# Patient Record
Sex: FEMALE | Race: WHITE | Employment: UNEMPLOYED | URBAN - METROPOLITAN AREA
[De-identification: names, ages, dates, MRNs, and addresses within clinical notes are randomized per-mention and may not be internally consistent; named-entity substitution may affect disease eponyms.]

---

## 2017-01-30 DIAGNOSIS — R26.9 GAIT ABNORMALITY: ICD-10-CM

## 2017-01-30 DIAGNOSIS — G62.9 NEUROPATHY: ICD-10-CM

## 2017-01-30 RX ORDER — DULOXETIN HYDROCHLORIDE 60 MG/1
CAPSULE, DELAYED RELEASE ORAL
Qty: 180 CAP | Refills: 2 | Status: SHIPPED | OUTPATIENT
Start: 2017-01-30

## 2017-05-25 ENCOUNTER — HOSPITAL ENCOUNTER (OUTPATIENT)
Dept: MRI IMAGING | Age: 62
Discharge: HOME | End: 2017-05-25
Attending: PHYSICAL MEDICINE & REHABILITATION
Payer: MEDICARE

## 2017-05-25 DIAGNOSIS — M54.50 LUMBAGO: ICD-10-CM

## 2017-05-25 DIAGNOSIS — M54.16 LUMBAR RADICULOPATHY: ICD-10-CM

## 2017-05-25 DIAGNOSIS — M47.816 LUMBAR SPONDYLOSIS: ICD-10-CM

## 2017-05-25 PROCEDURE — 72148 MRI LUMBAR SPINE W/O DYE: CPT

## 2017-12-28 ENCOUNTER — HOSPITAL ENCOUNTER (OUTPATIENT)
Dept: MRI IMAGING | Age: 62
Discharge: HOME | End: 2017-12-28
Attending: PHYSICAL MEDICINE & REHABILITATION
Payer: MEDICARE

## 2017-12-28 DIAGNOSIS — M50.30 DDD (DEGENERATIVE DISC DISEASE), CERVICAL: ICD-10-CM

## 2017-12-28 DIAGNOSIS — M54.2 CERVICALGIA: ICD-10-CM

## 2017-12-28 PROCEDURE — 72141 MRI NECK SPINE W/O DYE: CPT

## 2018-05-19 ENCOUNTER — HOSPITAL ENCOUNTER (OUTPATIENT)
Dept: MRI IMAGING | Age: 63
Discharge: HOME | End: 2018-05-19
Attending: PHYSICAL MEDICINE & REHABILITATION
Payer: MEDICARE

## 2018-05-19 DIAGNOSIS — M51.36 DDD (DEGENERATIVE DISC DISEASE), LUMBAR: ICD-10-CM

## 2018-05-19 DIAGNOSIS — M54.31 RIGHT SIDED SCIATICA: ICD-10-CM

## 2018-05-19 PROCEDURE — 72148 MRI LUMBAR SPINE W/O DYE: CPT

## 2018-05-25 ENCOUNTER — ANESTHESIA (OUTPATIENT)
Dept: ENDOSCOPY | Age: 63
End: 2018-05-25
Payer: MEDICARE

## 2018-05-25 ENCOUNTER — HOSPITAL ENCOUNTER (OUTPATIENT)
Age: 63
Setting detail: OUTPATIENT SURGERY
Discharge: HOME | End: 2018-05-25
Attending: SPECIALIST | Admitting: SPECIALIST
Payer: MEDICARE

## 2018-05-25 ENCOUNTER — ANESTHESIA EVENT (OUTPATIENT)
Dept: ENDOSCOPY | Age: 63
End: 2018-05-25
Payer: MEDICARE

## 2018-05-25 VITALS
HEART RATE: 85 BPM | BODY MASS INDEX: 27.49 KG/M2 | SYSTOLIC BLOOD PRESSURE: 135 MMHG | WEIGHT: 149.38 LBS | OXYGEN SATURATION: 100 % | HEIGHT: 62 IN | RESPIRATION RATE: 11 BRPM | DIASTOLIC BLOOD PRESSURE: 98 MMHG | TEMPERATURE: 97.5 F

## 2018-05-25 PROCEDURE — 77030009426 HC FCPS BIOP ENDOSC BSC -B: Performed by: SPECIALIST

## 2018-05-25 PROCEDURE — 88305 TISSUE EXAM BY PATHOLOGIST: CPT | Performed by: SPECIALIST

## 2018-05-25 PROCEDURE — 74011250636 HC RX REV CODE- 250/636

## 2018-05-25 PROCEDURE — 74011000250 HC RX REV CODE- 250

## 2018-05-25 PROCEDURE — 76060000031 HC ANESTHESIA FIRST 0.5 HR: Performed by: SPECIALIST

## 2018-05-25 PROCEDURE — 74011250636 HC RX REV CODE- 250/636: Performed by: SPECIALIST

## 2018-05-25 PROCEDURE — 76040000019: Performed by: SPECIALIST

## 2018-05-25 RX ORDER — EPINEPHRINE 0.1 MG/ML
1 INJECTION INTRACARDIAC; INTRAVENOUS
Status: CANCELLED | OUTPATIENT
Start: 2018-05-25 | End: 2018-05-25

## 2018-05-25 RX ORDER — SODIUM CHLORIDE 0.9 % (FLUSH) 0.9 %
5-10 SYRINGE (ML) INJECTION AS NEEDED
Status: DISCONTINUED | OUTPATIENT
Start: 2018-05-25 | End: 2018-05-25 | Stop reason: HOSPADM

## 2018-05-25 RX ORDER — SODIUM CHLORIDE 0.9 % (FLUSH) 0.9 %
5-10 SYRINGE (ML) INJECTION EVERY 8 HOURS
Status: DISCONTINUED | OUTPATIENT
Start: 2018-05-25 | End: 2018-05-25 | Stop reason: HOSPADM

## 2018-05-25 RX ORDER — FLUMAZENIL 0.1 MG/ML
0.2 INJECTION INTRAVENOUS
Status: CANCELLED | OUTPATIENT
Start: 2018-05-25 | End: 2018-05-25

## 2018-05-25 RX ORDER — ATROPINE SULFATE 0.1 MG/ML
0.5 INJECTION INTRAVENOUS
Status: CANCELLED | OUTPATIENT
Start: 2018-05-25 | End: 2018-05-25

## 2018-05-25 RX ORDER — LIDOCAINE HYDROCHLORIDE 20 MG/ML
INJECTION, SOLUTION EPIDURAL; INFILTRATION; INTRACAUDAL; PERINEURAL AS NEEDED
Status: DISCONTINUED | OUTPATIENT
Start: 2018-05-25 | End: 2018-05-25 | Stop reason: HOSPADM

## 2018-05-25 RX ORDER — PROPOFOL 10 MG/ML
INJECTION, EMULSION INTRAVENOUS AS NEEDED
Status: DISCONTINUED | OUTPATIENT
Start: 2018-05-25 | End: 2018-05-25 | Stop reason: HOSPADM

## 2018-05-25 RX ORDER — DEXTROMETHORPHAN/PSEUDOEPHED 2.5-7.5/.8
1.2 DROPS ORAL
Status: DISCONTINUED | OUTPATIENT
Start: 2018-05-25 | End: 2018-05-25 | Stop reason: HOSPADM

## 2018-05-25 RX ORDER — SODIUM CHLORIDE 9 MG/ML
50 INJECTION, SOLUTION INTRAVENOUS CONTINUOUS
Status: DISCONTINUED | OUTPATIENT
Start: 2018-05-25 | End: 2018-05-25 | Stop reason: HOSPADM

## 2018-05-25 RX ORDER — DEXTROMETHORPHAN/PSEUDOEPHED 2.5-7.5/.8
1.2 DROPS ORAL
Status: CANCELLED | OUTPATIENT
Start: 2018-05-25

## 2018-05-25 RX ORDER — NALOXONE HYDROCHLORIDE 0.4 MG/ML
0.4 INJECTION, SOLUTION INTRAMUSCULAR; INTRAVENOUS; SUBCUTANEOUS
Status: CANCELLED | OUTPATIENT
Start: 2018-05-25 | End: 2018-05-25

## 2018-05-25 RX ADMIN — PROPOFOL 120 MG: 10 INJECTION, EMULSION INTRAVENOUS at 08:26

## 2018-05-25 RX ADMIN — SODIUM CHLORIDE 50 ML/HR: 900 INJECTION, SOLUTION INTRAVENOUS at 08:05

## 2018-05-25 RX ADMIN — LIDOCAINE HYDROCHLORIDE 40 MG: 20 INJECTION, SOLUTION EPIDURAL; INFILTRATION; INTRACAUDAL; PERINEURAL at 08:10

## 2018-05-25 NOTE — IP AVS SNAPSHOT
[address] 
[phone] Patient: Telma Duffy MRN: [id number] About your hospitalization You were admitted on:  May 25, 2018 You last received care in the:  \[facility]\"" ENDOSCOPY You were discharged on:  May 25, 2018 Why you were hospitalized Your primary diagnosis was:  Not on File Follow-up Information None Discharge Orders None A check jamee indicates which time of day the medication should be taken. My Medications CONTINUE taking these medications Instructions Each Dose to Equal  
 Morning Noon Evening Bedtime  
 cyclobenzaprine 10 mg tablet Commonly known as:  FLEXERIL Your last dose was: Your next dose is: Take 1 Tab by mouth three (3) times daily as needed for Muscle Spasm(s). 10 mg  
    
   
   
   
  
 diclofenac EC 75 mg EC tablet Commonly known as:  VOLTAREN Your last dose was: Your next dose is: Take 75 mg by mouth three (3) times daily. 75 mg  
    
   
   
   
  
 diphenhydrAMINE 50 mg tablet Commonly known as:  BENADRYL Your last dose was: Your next dose is: Take 100 mg by mouth two (2) times a day. Indications: ALLERGIC REACTIONS  
 100 mg DULoxetine 60 mg capsule Commonly known as:  CYMBALTA Your last dose was: Your next dose is: TAKE 2 CAPSULES DAILY  
     
   
   
   
  
 gabapentin 300 mg capsule Commonly known as:  NEURONTIN Your last dose was: Your next dose is: TAKE 1 CAPSULE THREE TIMES A DAY  
     
   
   
   
  
 LOMOTIL 2.5-0.025 mg per tablet Generic drug:  diphenoxylate-atropine Your last dose was: Your next dose is: Take 1 Tab by mouth four (4) times daily as needed. 1 Tab  
    
   
   
   
  
 minocycline 100 mg capsule Commonly known as:  Severo Baar Your last dose was: Your next dose is: * PHENERGAN PO Your last dose was: Your next dose is: Take 25 mg by mouth as needed for Nausea. 25 mg  
    
   
   
   
  
 * promethazine 25 mg tablet Commonly known as:  PHENERGAN Your last dose was: Your next dose is: Take 1 tablet by mouth every six (6) hours as needed for Nausea. 25 mg  
    
   
   
   
  
 ZOCOR PO Your last dose was: Your next dose is: Take 20 mg by mouth nightly. 20 mg  
    
   
   
   
  
 * Notice: This list has 2 medication(s) that are the same as other medications prescribed for you. Read the directions carefully, and ask your doctor or other care provider to review them with you. Discharge Instructions Gurjitbaudilio Guerra [id number] 
1955 COLON DISCHARGE INSTRUCTIONS Discomfort: 
Redness at IV site- apply warm compress to area; if redness or soreness persist- contact your physician There may be a slight amount of blood passed from the rectum Gaseous discomfort- walking, belching will help relieve any discomfort You may not operate a vehicle for 12 hours You may not engage in an occupation involving machinery or appliances for rest of today You may not drink alcoholic beverages for at least 12 hours Avoid making any critical decisions for at least 24 hour DIET: 
 Regular diet.  however -  remember your colon is empty and a heavy meal will produce gas. Avoid these foods:  vegetables, fried / greasy foods, carbonated drinks for today. MEDICATIONS: 
  
 
 Regarding Aspirin or Nonsteroidal medications, please see below. ACTIVITY: 
You may resume your normal daily activities it is recommended that you spend the remainder of the day resting -  avoid any strenuous activity. CALL M.D. ANY SIGN OF: Increasing pain, nausea, vomiting Abdominal distension (swelling) New increased bleeding (oral or rectal) Fever (chills) ONLY  Tylenol as needed for pain. Follow-up Instructions: 
 Call Dr. Aaliyah Cervantes for results of procedure / biopsy in 4-5 days at telephone #  [phone] Repeat Colonoscopy in 5 years. . 
 
  
  
  
Introducing [facility]! Dear Jose Mcconnell: 
Thank you for requesting a [facility]ado account. Our records indicate that you already have an active Brevado account. You can access your account anytime at [email or web address] Did you know that you can access your hospital and ER discharge instructions at any time in Brevado? You can also review all of your test results from your hospital stay or ER visit. Additional Information If you have questions, please visit the Frequently Asked Questions section of the Brevado website at [email or web address] Remember, Brevado is NOT to be used for urgent needs. For medical emergencies, dial 911. Now available from your iPhone and Android! Introducing Gurjit Gonzalez As a [facility] patient, I wanted to make you aware of our electronic visit tool called Gurjit PurvisVoice Of TV. Zofia Roblesek 24/7 allows you to connect within minutes with a medical provider 24 hours a day, seven days a week via a mobile device or tablet or logging into a secure website from your computer. You can access Gurjit Teonievesfin from anywhere in the [place]. A virtual visit might be right for you when you have a simple condition and feel like you just dont want to get out of bed, or cant get away from work for an appointment, when your regular [facility] provider is not available (evenings, weekends or holidays), or when youre out of town and need minor care.   Electronic visits cost only $49 and if the Gurjit Gonzalez provider determines a prescription is needed to treat your condition, one can be electronically transmitted to a nearby pharmacy*. Please take a moment to enroll today if you have not already done so. The enrollment process is free and takes just a few minutes. To enroll, please download the Robert Cho 24/7 mary anne to your tablet or phone, or visit [email or web address] to enroll on your computer. And, as an [address] [place] [address] patient with a [facility] account, the results of your visits will be scanned into your electronic medical record and your primary care provider will be able to view the scanned results. We urge you to continue to see your regular [facility] provider for your ongoing medical care. And while your primary care provider may not be the one available when you seek a [facility] virtual visit, the peace of mind you get from getting a real diagnosis real time can be priceless. For more information on [facility], view our Frequently Asked Questions (FAQs) at [email or web address]. Sincerely, 
 
Matt Quiroz MD 
Chief Medical Officer [facility] Dedra Rucker *:  certain medications cannot be prescribed via [facility] Providers Seen During Your Hospitalization Provider Specialty Primary office phone Elena Gay MD Gastroenterology [phone] Your Primary Care Physician (PCP) Primary Care Physician Office Phone Office Fax Sharri Quan [phone] You are allergic to the following Allergen Reactions Aspirin Other (comments) Blacked out. Recent Documentation Height Weight BMI OB Status Smoking Status 1.575 m 67.8 kg 27.32 kg/m2 Hysterectomy Current Every Day Smoker Emergency Contacts Name Discharge Info Relation Home Work Mobile David Jovel DISCHARGE CAREGIVER [3] Spouse [3] [phone] Patient Belongings The following personal items are in your possession at time of discharge: Dental Appliances: Lowers, Uppers  Visual Aid: None Please provide this summary of care documentation to your next provider. Signatures-by signing, you are acknowledging that this After Visit Summary has been reviewed with you and you have received a copy. Patient Signature:  ____________________________________________________________ Date:  ____________________________________________________________  
  
Nadya [place] Provider Signature:  ____________________________________________________________ Date:  ____________________________________________________________

## 2018-05-25 NOTE — PERIOP NOTES
Anesthesia reports 120mg Propofol, 40mg Lidocaine and 200mL NS given during procedure. Received report from anesthesia staff on vital signs and status of patient.

## 2018-05-25 NOTE — ADDENDUM NOTE
Addendum  created 05/25/18 1213 by Kanika Marie DO    Anesthesia Event edited, Procedure Event Log accessed

## 2018-05-25 NOTE — H&P
Gastroenterology Outpatient History and Physical    Patient: Lonny Gonzalez    Physician: Saranya Serna MD    Vital Signs: Blood pressure (!) 141/91, pulse 81, temperature 97.9 °F (36.6 °C), resp. rate 14, height 5' 2\" (1.575 m), weight 67.8 kg (149 lb 6 oz), SpO2 97 %. Allergies: Allergies   Allergen Reactions    Aspirin Other (comments)     Blacked out. Chief Complaint: h/o polyps    History of Present Illness: 64 yo WF for colonoscopy for h/o polyps and diarrhea.     Justification for Procedure: above    History:  Past Medical History:   Diagnosis Date    Arthritis     RHEUMATOID AND OSTEOARTHRITIS    C. difficile diarrhea 2004    as of 10/23/14 pt denies any diarrhea or sx    Cancer ([facility] [address] 1982    uterine CA, hysterectomy, no chemo / radiation    Cancer ([facility] [address] 2008    Rt BR; lumpectomy, radiation with chemo pill x6 yrs    Fibromyalgia     GERD (gastroesophageal reflux disease)     Hypercholesteremia     Other ill-defined conditions(799.89)     GRANULOMA ANEULARE, SKIN DISORDER    Psychiatric disorder     ANXIETY    Seasonal allergies       Past Surgical History:   Procedure Laterality Date    ABDOMEN SURGERY [address] (1/2 REMOVED; \"WAS IN KNOTS\")    BREAST SURGERY PROCEDURE UNLISTED Right 2/2008    RIGHT BREAST LUMPECTOMY    HX CARPAL TUNNEL RELEASE Bilateral     HX CERVICAL DISKECTOMY  2/3/14    C4-5     HX COLONOSCOPY      HX GI      ENDOSCOPY (DILATATION/ESOPH)    HX HYSTERECTOMY  1982    HX KNEE ARTHROSCOPY Left     x2    HX MOHS PROCEDURES Right     HX ORTHOPAEDIC  2005, 2011    CERVICAL NECK    HX OTHER SURGICAL  3/2008    hematoma s/p Rt BR lumpectomy    HX TONSILLECTOMY  1963    T&A      Social History     Social History    Marital status:      Spouse name: N/A    Number of children: N/A    Years of education: N/A     Social History Main Topics    Smoking status: Current Every Day Smoker     Packs/day: 0.50     Years: 45.00    Smokeless tobacco: Never Used    Alcohol use Yes      Comment: once a month    Drug use: No    Sexual activity: Not Asked     Other Topics Concern    None     Social History Narrative      Family History   Problem Relation Age of Onset    Heart Disease Mother     High Cholesterol Mother     Hypertension Mother     High Cholesterol Father     Heart Disease Father     Obesity Father     Diabetes Father     Cancer Maternal Grandmother      colon    Anesth Problems Neg Hx        Medications:   Prior to Admission medications    Medication Sig Start Date End Date Taking? Authorizing Provider   DULoxetine (CYMBALTA) 60 mg capsule TAKE 2 CAPSULES DAILY 1/30/17  Yes Nicholas Campo MD   gabapentin (NEURONTIN) 300 mg capsule TAKE 1 CAPSULE THREE TIMES A DAY 5/3/16  Yes Avinash Briggs MD   cyclobenzaprine (FLEXERIL) 10 mg tablet Take 1 Tab by mouth three (3) times daily as needed for Muscle Spasm(s). 1/20/16  Yes Nicholas Campo MD   diphenhydrAMINE (BENADRYL) 50 mg tablet Take 100 mg by mouth two (2) times a day. Indications: ALLERGIC REACTIONS   Yes Historical Provider   SIMVASTATIN (ZOCOR PO) Take 20 mg by mouth nightly. Yes Bertha Miller MD   diphenoxylate-atropine (LOMOTIL) 2.5-0.025 mg per tablet Take 1 Tab by mouth four (4) times daily as needed. Yes Bertha Miller MD   minocycline (MINOCIN, DYNACIN) 100 mg capsule  4/9/15   Historical Provider   diclofenac EC (VOLTAREN) 75 mg EC tablet Take 75 mg by mouth three (3) times daily. Historical Provider   promethazine (PHENERGAN) 25 mg tablet Take 1 tablet by mouth every six (6) hours as needed for Nausea. 10/28/14   Cinthya Lawson DPM   PROMETHAZINE HCL (PHENERGAN PO) Take 25 mg by mouth as needed for Nausea. Bertha Miller MD       Physical Exam:   General: alert, no distress   HEENT: Head: Normocephalic, no lesions, without obvious abnormality.    Heart: regular rate and rhythm, S1, S2 normal, no murmur, click, rub or gallop   Lungs: chest clear, no wheezing, rales, normal symmetric air entry   Abdominal: soft, NT/ND + BS   Neurological: Grossly normal   Extremities: extremities normal, atraumatic, no cyanosis or edema     Findings/Diagnosis: h/o polyps    Plan of Care/Planned Procedure: Colonoscopy    Signed By: Donald Kinsey MD     May 25, 2018

## 2018-05-25 NOTE — PROCEDURES
Colonoscopy Procedure Note    Indications:   Personal history of colon polyps (screening only)    Referring Physician: Fabricio Castaneda NP  Anesthesia/Sedation: MAC anesthesia Propofol  Endoscopist:  Dr. Gopi Mina    Procedure in Detail:  Informed consent was obtained for the procedure, including sedation. Risks of perforation, hemorrhage, adverse drug reaction, and aspiration were discussed. The patient was placed in the left lateral decubitus position. Based on the pre-procedure assessment, including review of the patient's medical history, medications, allergies, and review of systems, she had been deemed to be an appropriate candidate for moderate sedation; she was therefore sedated with the medications listed above. The patient was monitored continuously with ECG tracing, pulse oximetry, blood pressure monitoring, and direct observations. A rectal examination was performed. The OSI110EN was inserted into the rectum and advanced under direct vision to the cecum, which was identified by the ileocecal valve and appendiceal orifice. The quality of the colonic preparation was fair. A careful inspection was made as the colonoscope was withdrawn, including a retroflexed view of the rectum; findings and interventions are described below. Appropriate photodocumentation was obtained. Findings:     1. Scope advanced to the cecum and terminal ileum. 2.  There is scattered stool present but overall adequate prep overall. 3.  There was a 4 mm polyp in the cecum s/p cold forcep removal.  4.  S/P Random bx of R and L side of colon to r/o colitis. Therapies:  See above    Specimen: Specimens were collected as described above and sent to pathology. Complications: None were encountered during the procedure. EBL: < 10 ml.     Recommendations:   -f/u path to determine next appropriate colonoscopy interval    Signed By: Elena Gay MD                        May 25, 2018

## 2018-05-25 NOTE — ROUTINE PROCESS
Tosin Martinez  1955  [id number]    Situation:  Verbal report received from: [address] RN  Procedure: Procedure(s):  COLONOSCOPY  COLON BIOPSY    Background:    Preoperative diagnosis: personal hystory of polyps  Postoperative diagnosis: Colon polyp    :  Dr. Arianne Serrano  Assistant(s): Endoscopy Technician-1: Cee Ricketts  Endoscopy RN-1: Gerardo England RN    Specimens:   ID Type Source Tests Collected by Time Destination   1 : Cecum polyp biopsy Preservative   Carolina Rascon MD 5/25/2018 0820 Pathology   2 : Random colon biopsy Preservative   Carolina Rascon MD 5/25/2018 5650 Pathology     H. Pylori  no    Assessment:  Intra-procedure medication    Anesthesia gave intra-procedure sedation and medications, see anesthesia flow sheet yes    Intravenous fluids: NS@ KVO     Vital signs stable       Abdominal assessment: round and soft       Recommendation:  Discharge patient per MD order  .     Family or Friend Cinda Silva   Permission to share finding with family or friend yes

## 2018-05-25 NOTE — ANESTHESIA POSTPROCEDURE EVALUATION
Post-Anesthesia Evaluation and Assessment    Patient: Telma Duffy MRN: [id number]  SSN: [id number]    YOB: 1955  Age: 63 y.o. Sex: female       Cardiovascular Function/Vital Signs  Visit Vitals    BP (!) 135/98    Pulse 85    Temp 36.4 °C (97.5 °F)    Resp 11    Ht 5' 2\" (1.575 m)    Wt 67.8 kg (149 lb 6 oz)    SpO2 100%    BMI 27.32 kg/m2       Patient is status post general, total IV anesthesia anesthesia for Procedure(s):  COLONOSCOPY  COLON BIOPSY. Nausea/Vomiting: None    Postoperative hydration reviewed and adequate. Pain:  Pain Scale 1: Numeric (0 - 10) (05/25/18 0840)  Pain Intensity 1: 0 (05/25/18 0840)   Managed    Neurological Status: At baseline    Mental Status and Level of Consciousness: Arousable    Pulmonary Status:   O2 Device: Room air (05/25/18 0900)   Adequate oxygenation and airway patent    Complications related to anesthesia: None    Post-anesthesia assessment completed.  No concerns    Signed By: Kanika Marie DO     May 25, 2018

## 2018-05-25 NOTE — ANESTHESIA PREPROCEDURE EVALUATION
Anesthetic History   No history of anesthetic complications            Review of Systems / Medical History  Patient summary reviewed, nursing notes reviewed and pertinent labs reviewed    Pulmonary          Smoker (1/4 -1/2 ppd)         Neuro/Psych         Psychiatric history (anxiety)     Cardiovascular                Pertinent negatives: Past MI: limited by feet.   Exercise tolerance: <4 METS: Limited by pain     GI/Hepatic/Renal     GERD (occasional): well controlled           Endo/Other        Arthritis (Rheumatoid & osteo) and cancer (uterin, 1982 & right breast, 2008--no lymph node work, s/p chemo, XRT)     Other Findings   Comments: Fibromyalgia  3 cervical fusions           Physical Exam    Airway  Mallampati: II  TM Distance: 4 - 6 cm  Neck ROM: decreased range of motion   Mouth opening: Normal     Cardiovascular    Rhythm: regular  Rate: normal      Pertinent negatives: No murmur   Dental    Dentition: Full upper dentures and Full lower dentures     Pulmonary  Breath sounds clear to auscultation               Abdominal  GI exam deferred       Other Findings            Anesthetic Plan    ASA: 3  Anesthesia type: general and total IV anesthesia          Induction: Intravenous  Anesthetic plan and risks discussed with: Patient

## 2018-05-25 NOTE — DISCHARGE INSTRUCTIONS
Aaron Ramírez  [id number]  1955    COLON DISCHARGE INSTRUCTIONS  Discomfort:  Redness at IV site- apply warm compress to area; if redness or soreness persist- contact your physician  There may be a slight amount of blood passed from the rectum  Gaseous discomfort- walking, belching will help relieve any discomfort  You may not operate a vehicle for 12 hours  You may not engage in an occupation involving machinery or appliances for rest of today  You may not drink alcoholic beverages for at least 12 hours  Avoid making any critical decisions for at least 24 hour  DIET:   Regular diet. - however -  remember your colon is empty and a heavy meal will produce gas. Avoid these foods:  vegetables, fried / greasy foods, carbonated drinks for today. MEDICATIONS:        Regarding Aspirin or Nonsteroidal medications, please see below. ACTIVITY:  You may resume your normal daily activities it is recommended that you spend the remainder of the day resting -  avoid any strenuous activity. CALL M.D. ANY SIGN OF:  Increasing pain, nausea, vomiting  Abdominal distension (swelling)  New increased bleeding (oral or rectal)  Fever (chills)    ONLY  Tylenol as needed for pain. Follow-up Instructions:   Call Dr. Marylou Pineda for results of procedure / biopsy in 4-5 days at telephone #  [phone]              Repeat Colonoscopy in 5 years. Maximo Joiner

## 2018-05-25 NOTE — PERIOP NOTES
Endoscope was pre-clean[facility] at the bedside [facility]

## 2018-06-14 ENCOUNTER — HOSPITAL ENCOUNTER (OUTPATIENT)
Dept: PREADMISSION TESTING | Age: 63
Discharge: HOME | End: 2018-06-14
Attending: ORTHOPAEDIC SURGERY
Payer: MEDICARE

## 2018-06-14 ENCOUNTER — HOSPITAL ENCOUNTER (OUTPATIENT)
Dept: GENERAL RADIOLOGY | Age: 63
Discharge: HOME | End: 2018-06-14
Attending: ORTHOPAEDIC SURGERY
Payer: MEDICARE

## 2018-06-14 VITALS
RESPIRATION RATE: 16 BRPM | HEART RATE: 96 BPM | WEIGHT: 150.57 LBS | OXYGEN SATURATION: 100 % | TEMPERATURE: 98.3 F | BODY MASS INDEX: 27.71 KG/M2 | DIASTOLIC BLOOD PRESSURE: 84 MMHG | HEIGHT: 62 IN | SYSTOLIC BLOOD PRESSURE: 137 MMHG

## 2018-06-14 LAB
25(OH)D3 SERPL-MCNC: 25 NG/ML (ref 30–100)
ABO + RH BLD: NORMAL
ALBUMIN SERPL-MCNC: 3.6 G/DL (ref 3.5–5)
ALBUMIN/GLOB SERPL: 1.4 {RATIO} (ref 1.1–2.2)
ALP SERPL-CCNC: 64 U/L (ref 45–117)
ALT SERPL-CCNC: 25 U/L (ref 12–78)
ANION GAP SERPL CALC-SCNC: 7 MMOL/L (ref 5–15)
APPEARANCE UR: CLEAR
AST SERPL-CCNC: 14 U/L (ref 15–37)
BACTERIA URNS QL MICRO: NEGATIVE /HPF
BILIRUB SERPL-MCNC: 0.4 MG/DL (ref 0.2–1)
BILIRUB UR QL: NEGATIVE
BLOOD GROUP ANTIBODIES SERPL: NORMAL
BUN SERPL-MCNC: 19 MG/DL (ref 6–20)
BUN/CREAT SERPL: 23 (ref 12–20)
CALCIUM SERPL-MCNC: 8.8 MG/DL (ref 8.5–10.1)
CHLORIDE SERPL-SCNC: 108 MMOL/L (ref 97–108)
CO2 SERPL-SCNC: 25 MMOL/L (ref 21–32)
COLOR UR: NORMAL
CREAT SERPL-MCNC: 0.81 MG/DL (ref 0.55–1.02)
EPITH CASTS URNS QL MICRO: NORMAL /LPF
ERYTHROCYTE [DISTWIDTH] IN BLOOD BY AUTOMATED COUNT: 14.5 % (ref 11.5–14.5)
EST. AVERAGE GLUCOSE BLD GHB EST-MCNC: 126 MG/DL
GLOBULIN SER CALC-MCNC: 2.5 G/DL (ref 2–4)
GLUCOSE SERPL-MCNC: 96 MG/DL (ref 65–100)
GLUCOSE UR STRIP.AUTO-MCNC: NEGATIVE MG/DL
HBA1C MFR BLD: 6 % (ref 4.2–6.3)
HCT VFR BLD AUTO: 38.9 % (ref 35–47)
HGB BLD-MCNC: 12.9 G/DL (ref 11.5–16)
HGB UR QL STRIP: NEGATIVE
HYALINE CASTS URNS QL MICRO: NORMAL /LPF (ref 0–5)
INR PPP: 1 (ref 0.9–1.1)
KETONES UR QL STRIP.AUTO: NEGATIVE MG/DL
LEUKOCYTE ESTERASE UR QL STRIP.AUTO: NEGATIVE
MCH RBC QN AUTO: 31 PG (ref 26–34)
MCHC RBC AUTO-ENTMCNC: 33.2 G/DL (ref 30–36.5)
MCV RBC AUTO: 93.5 FL (ref 80–99)
NITRITE UR QL STRIP.AUTO: NEGATIVE
NRBC # BLD: 0 K/UL (ref 0–0.01)
NRBC BLD-RTO: 0 PER 100 WBC
PH UR STRIP: 6.5 [PH] (ref 5–8)
PLATELET # BLD AUTO: 202 K/UL (ref 150–400)
PMV BLD AUTO: 10.4 FL (ref 8.9–12.9)
POTASSIUM SERPL-SCNC: 4.2 MMOL/L (ref 3.5–5.1)
PREALB SERPL-MCNC: 25.2 MG/DL (ref 20–40)
PROT SERPL-MCNC: 6.1 G/DL (ref 6.4–8.2)
PROT UR STRIP-MCNC: NEGATIVE MG/DL
PROTHROMBIN TIME: 9.6 SEC (ref 9–11.1)
RBC # BLD AUTO: 4.16 M/UL (ref 3.8–5.2)
RBC #/AREA URNS HPF: NORMAL /HPF (ref 0–5)
SODIUM SERPL-SCNC: 140 MMOL/L (ref 136–145)
SP GR UR REFRACTOMETRY: 1.02 (ref 1–1.03)
SPECIMEN EXP DATE BLD: NORMAL
UA: UC IF INDICATED,UAUC: NORMAL
UROBILINOGEN UR QL STRIP.AUTO: 0.2 EU/DL (ref 0.2–1)
WBC # BLD AUTO: 8.7 K/UL (ref 3.6–11)
WBC URNS QL MICRO: NORMAL /HPF (ref 0–4)

## 2018-06-14 PROCEDURE — 93005 ELECTROCARDIOGRAM TRACING: CPT

## 2018-06-14 PROCEDURE — 36415 COLL VENOUS BLD VENIPUNCTURE: CPT | Performed by: ORTHOPAEDIC SURGERY

## 2018-06-14 PROCEDURE — 82306 VITAMIN D 25 HYDROXY: CPT | Performed by: ORTHOPAEDIC SURGERY

## 2018-06-14 PROCEDURE — 86900 BLOOD TYPING SEROLOGIC ABO: CPT | Performed by: ORTHOPAEDIC SURGERY

## 2018-06-14 PROCEDURE — 83036 HEMOGLOBIN GLYCOSYLATED A1C: CPT | Performed by: ORTHOPAEDIC SURGERY

## 2018-06-14 PROCEDURE — 84134 ASSAY OF PREALBUMIN: CPT | Performed by: ORTHOPAEDIC SURGERY

## 2018-06-14 PROCEDURE — 80053 COMPREHEN METABOLIC PANEL: CPT | Performed by: ORTHOPAEDIC SURGERY

## 2018-06-14 PROCEDURE — 71046 X-RAY EXAM CHEST 2 VIEWS: CPT

## 2018-06-14 PROCEDURE — 81001 URINALYSIS AUTO W/SCOPE: CPT | Performed by: ORTHOPAEDIC SURGERY

## 2018-06-14 PROCEDURE — 85610 PROTHROMBIN TIME: CPT | Performed by: ORTHOPAEDIC SURGERY

## 2018-06-14 PROCEDURE — 85027 COMPLETE CBC AUTOMATED: CPT | Performed by: ORTHOPAEDIC SURGERY

## 2018-06-14 RX ORDER — DICLOFENAC SODIUM 10 MG/G
GEL TOPICAL AS NEEDED
COMMUNITY

## 2018-06-14 RX ORDER — SODIUM CHLORIDE, SODIUM LACTATE, POTASSIUM CHLORIDE, CALCIUM CHLORIDE 600; 310; 30; 20 MG/100ML; MG/100ML; MG/100ML; MG/100ML
25 INJECTION, SOLUTION INTRAVENOUS CONTINUOUS
Status: CANCELLED | OUTPATIENT
Start: 2018-06-19

## 2018-06-14 RX ORDER — ACETAMINOPHEN 10 MG/ML
1000 INJECTION, SOLUTION INTRAVENOUS ONCE
Status: CANCELLED | OUTPATIENT
Start: 2018-06-19 | End: 2018-06-19

## 2018-06-14 RX ORDER — CEFAZOLIN SODIUM/WATER 2 G/20 ML
2 SYRINGE (ML) INTRAVENOUS ONCE
Status: CANCELLED | OUTPATIENT
Start: 2018-06-19 | End: 2018-06-19

## 2018-06-14 RX ORDER — PREGABALIN 150 MG/1
150 CAPSULE ORAL ONCE
Status: CANCELLED | OUTPATIENT
Start: 2018-06-19 | End: 2018-06-19

## 2018-06-14 NOTE — PERIOP NOTES
[facility]  Preoperative Instructions        Surgery Date 06/19/18          Time of Arrival--  To be called with time Contact # [phone]    1. On the day of your surgery, please report to the Surgical Services Registration Desk and sign in at your designated time. The Surgery Center is located to the right of the Emergency Room. 2. You must have someone with you to drive you home. You should not drive a car for 24 hours following surgery. Please make arrangements for a friend or family member to stay with you for the first 24 hours after your surgery. 3. Do not have anything to eat or drink (including water, gum, mints, coffee, juice) after midnight 06/18/18     . ? This may not apply to medications prescribed by your physician. ?(Please note below the special instructions with medications to take the morning of your procedure.)    4. We recommend you do not drink any alcoholic beverages for 24 hours before and after your surgery. 5. Contact your surgeons office for instructions on the following medications: non-steroidal anti-inflammatory drugs (i.e. Advil, Aleve), vitamins, and supplements. (Some surgeons will want you to stop these medications prior to surgery and others may allow you to take them)  **If you are currently taking Plavix, Coumadin, Aspirin and/or other blood-thinning agents, contact your surgeon for instructions. ** Your surgeon will partner with the physician prescribing these medications to determine if it is safe to stop or if you need to continue taking. Please do not stop taking these medications without instructions from your surgeon    6. Wear comfortable clothes. Wear glasses instead of contacts. Do not bring any money or jewelry. Please bring picture ID, insurance card, and any prearranged co-payment or hospital payment. Do not wear make-up, particularly mascara the morning of your surgery.   Do not wear nail polish, particularly if you are having foot /hand surgery. Wear your hair loose or down, no ponytails, buns, christi pins or clips. All body piercings must be removed. Please shower with antibacterial soap for three consecutive days before and on the morning of surgery, but do not apply any lotions, powders or deodorants after the shower on the day of surgery. Please use a fresh towels after each shower. Please sleep in clean clothes and change bed linens the night before surgery. Please do not shave for 48 hours prior to surgery. Shaving of the face is acceptable. 7. You should understand that if you do not follow these instructions your surgery may be cancelled. If your physical condition changes (I.e. fever, cold or flu) please contact your surgeon as soon as possible. 8. It is important that you be on time. If a situation occurs where you may be late, please call [phone] (OR Holding Area). 9. If you have any questions and or problems, please call [phone] (Pre-admission Testing). 10. Your surgery time may be subject to change. You will receive a phone call the evening prior if your time changes. 11.  If having outpatient surgery, you must have someone to drive you here, stay with you during the duration of your stay, and to drive you home at time of discharge. 12.   In an effort to improve the efficiency, privacy, and safety for all of our Pre-op patients visitors are not allowed in the Holding area. Once you arrive and are registered your family/visitors will be asked to remain in the waiting room. The Pre-op staff will get you from the Surgical Waiting Area and will explain to you and your family/visitors that the Pre-op phase is beginning. The staff will answer any questions and provide instructions for tracking of the patient, by use of the existing tracking number and color-coded status board in the waiting room.   At this time the staff will also ask for your designated spokesperson information in the event that the physician or staff need to provide an update or obtain any pertinent information. The designated spokesperson will be notified if the physician needs to speak to family during the pre-operative phase. If at any time your family/visitors has questions or concerns they may approach the volunteer desk in the waiting area for assistance. Special Instructions:Practice incentive spirometry/please bring incentive spirometry to the hospital for use    MEDICATIONS TO TAKE THE MORNING OF SURGERY WITH A SIP OF WATER:flexeril as needed,cymbalta,gabapentin      I understand a pre-operative phone call will be made to verify my surgery time. In the event that I am not available, I give permission for a message to be left on my answering service and/or with another person?   yes          ___________________      __________   _________    (Signature of Patient)             (Witness)                (Date and Time)

## 2018-06-14 NOTE — PERIOP NOTES
Incentive Spirometer        Using the incentive spirometer helps expand the small air sacs of your lungs, helps you breathe deeply, and helps improve your lung function. Use your incentive spirometer twice a day (10 breaths each time) prior to surgery. How to Use Your Incentive Spirometer:  1. Hold the incentive spirometer in an upright position. 2. Breathe out as usual.   3. Place the mouthpiece in your mouth and seal your lips tightly around it. 4. Take a deep breath. Breathe in slowly and as deeply as possible. Keep the blue flow rate guide between the arrows. 5. Hold your breath as long as possible. Then exhale slowly and allow the piston to fall to the bottom of the column. 6. Rest for a few seconds and repeat steps one through five at least 10 times. PAT Tidal Volume______2500____________  x____2____________  Date____06/14/18___________________    Braulio Hamper THE INCENTIVE SPIROMETER WITH YOU TO THE HOSPITAL ON THE DAY OF YOUR SURGERY. Opportunity given to ask and answer questions as well as to observe return demonstration.     Patient signature_____________________________    Witness____________________________
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
## 2018-06-15 LAB
ATRIAL RATE: 89 BPM
BACTERIA SPEC CULT: NORMAL
BACTERIA SPEC CULT: NORMAL
CALCULATED P AXIS, ECG09: 52 DEGREES
CALCULATED R AXIS, ECG10: 29 DEGREES
CALCULATED T AXIS, ECG11: 47 DEGREES
DIAGNOSIS, 93000: NORMAL
P-R INTERVAL, ECG05: 138 MS
Q-T INTERVAL, ECG07: 384 MS
QRS DURATION, ECG06: 80 MS
QTC CALCULATION (BEZET), ECG08: 467 MS
SERVICE CMNT-IMP: NORMAL
VENTRICULAR RATE, ECG03: 89 BPM

## 2018-06-15 NOTE — PERIOP NOTES
Called patient CARLOS and also instructed her to use her inhaler (incruse) and to bring with her to the hospital. Patient verbalized understanding.

## 2018-06-18 ENCOUNTER — HOSPITAL ENCOUNTER (OUTPATIENT)
Age: 63
Setting detail: OBSERVATION
Discharge: HOME | End: 2018-06-19
Attending: ORTHOPAEDIC SURGERY | Admitting: ORTHOPAEDIC SURGERY
Payer: MEDICARE

## 2018-06-18 ENCOUNTER — ANESTHESIA EVENT (OUTPATIENT)
Dept: SURGERY | Age: 63
End: 2018-06-18
Payer: MEDICARE

## 2018-06-18 ENCOUNTER — APPOINTMENT (OUTPATIENT)
Dept: GENERAL RADIOLOGY | Age: 63
End: 2018-06-18
Attending: ORTHOPAEDIC SURGERY
Payer: MEDICARE

## 2018-06-18 ENCOUNTER — ANESTHESIA (OUTPATIENT)
Dept: SURGERY | Age: 63
End: 2018-06-18
Payer: MEDICARE

## 2018-06-18 DIAGNOSIS — Z98.890 S/P DISCECTOMY: Primary | ICD-10-CM

## 2018-06-18 PROBLEM — M51.26 HNP (HERNIATED NUCLEUS PULPOSUS), LUMBAR: Status: ACTIVE | Noted: 2018-06-18

## 2018-06-18 PROCEDURE — 77030020061 HC IV BLD WRMR ADMIN SET 3M -B: Performed by: ANESTHESIOLOGY

## 2018-06-18 PROCEDURE — 77030032490 HC SLV COMPR SCD KNE COVD -B: Performed by: ORTHOPAEDIC SURGERY

## 2018-06-18 PROCEDURE — 77030034849: Performed by: ORTHOPAEDIC SURGERY

## 2018-06-18 PROCEDURE — 76010000162 HC OR TIME 1.5 TO 2 HR INTENSV-TIER 1: Performed by: ORTHOPAEDIC SURGERY

## 2018-06-18 PROCEDURE — 76060000034 HC ANESTHESIA 1.5 TO 2 HR: Performed by: ORTHOPAEDIC SURGERY

## 2018-06-18 PROCEDURE — 74011250636 HC RX REV CODE- 250/636

## 2018-06-18 PROCEDURE — 77030008684 HC TU ET CUF COVD -B: Performed by: ANESTHESIOLOGY

## 2018-06-18 PROCEDURE — 77030018846 HC SOL IRR STRL H20 ICUM -A: Performed by: ORTHOPAEDIC SURGERY

## 2018-06-18 PROCEDURE — 77030011266 HC ELECTRD BLD INSL COVD -A: Performed by: ORTHOPAEDIC SURGERY

## 2018-06-18 PROCEDURE — 72020 X-RAY EXAM OF SPINE 1 VIEW: CPT

## 2018-06-18 PROCEDURE — 76210000016 HC OR PH I REC 1 TO 1.5 HR: Performed by: ORTHOPAEDIC SURGERY

## 2018-06-18 PROCEDURE — 77030004391 HC BUR FLUT MEDT -C: Performed by: ORTHOPAEDIC SURGERY

## 2018-06-18 PROCEDURE — 77030033138 HC SUT PGA STRATFX J&J -B: Performed by: ORTHOPAEDIC SURGERY

## 2018-06-18 PROCEDURE — 74011000250 HC RX REV CODE- 250: Performed by: ORTHOPAEDIC SURGERY

## 2018-06-18 PROCEDURE — 74011250636 HC RX REV CODE- 250/636: Performed by: ANESTHESIOLOGY

## 2018-06-18 PROCEDURE — 77030003666 HC NDL SPINAL BD -A: Performed by: ORTHOPAEDIC SURGERY

## 2018-06-18 PROCEDURE — 77030012961 HC IRR KT CYSTO/TUR ICUM -A: Performed by: ORTHOPAEDIC SURGERY

## 2018-06-18 PROCEDURE — 77030021678 HC GLIDESCP STAT DISP VERT -B: Performed by: ANESTHESIOLOGY

## 2018-06-18 PROCEDURE — 77030003028 HC SUT VCRL J&J -A: Performed by: ORTHOPAEDIC SURGERY

## 2018-06-18 PROCEDURE — 77030029099 HC BN WAX SSPC -A: Performed by: ORTHOPAEDIC SURGERY

## 2018-06-18 PROCEDURE — 77030022704 HC SUT VLOC COVD -B: Performed by: ORTHOPAEDIC SURGERY

## 2018-06-18 PROCEDURE — 77030026438 HC STYL ET INTUB CARD -A: Performed by: ANESTHESIOLOGY

## 2018-06-18 PROCEDURE — 99218 HC RM OBSERVATION: CPT

## 2018-06-18 PROCEDURE — 77030018836 HC SOL IRR NACL ICUM -A: Performed by: ORTHOPAEDIC SURGERY

## 2018-06-18 PROCEDURE — 74011250637 HC RX REV CODE- 250/637: Performed by: ORTHOPAEDIC SURGERY

## 2018-06-18 PROCEDURE — 74011000250 HC RX REV CODE- 250

## 2018-06-18 PROCEDURE — 74011000272 HC RX REV CODE- 272: Performed by: ORTHOPAEDIC SURGERY

## 2018-06-18 PROCEDURE — 74011250636 HC RX REV CODE- 250/636: Performed by: ORTHOPAEDIC SURGERY

## 2018-06-18 PROCEDURE — 77030019908 HC STETH ESOPH SIMS -A: Performed by: ANESTHESIOLOGY

## 2018-06-18 PROCEDURE — 77030039267 HC ADH SKN EXOFIN S2SG -B: Performed by: ORTHOPAEDIC SURGERY

## 2018-06-18 PROCEDURE — 76001 XR FLUOROSCOPY OVER 60 MINUTES: CPT

## 2018-06-18 PROCEDURE — 77030014650 HC SEAL MTRX FLOSEL BAXT -C: Performed by: ORTHOPAEDIC SURGERY

## 2018-06-18 RX ORDER — SODIUM CHLORIDE 9 MG/ML
25 INJECTION, SOLUTION INTRAVENOUS CONTINUOUS
Status: DISCONTINUED | OUTPATIENT
Start: 2018-06-18 | End: 2018-06-19 | Stop reason: HOSPADM

## 2018-06-18 RX ORDER — MIDAZOLAM HYDROCHLORIDE 1 MG/ML
1 INJECTION, SOLUTION INTRAMUSCULAR; INTRAVENOUS AS NEEDED
Status: DISCONTINUED | OUTPATIENT
Start: 2018-06-18 | End: 2018-06-18

## 2018-06-18 RX ORDER — MORPHINE SULFATE 10 MG/ML
2 INJECTION, SOLUTION INTRAMUSCULAR; INTRAVENOUS
Status: DISCONTINUED | OUTPATIENT
Start: 2018-06-18 | End: 2018-06-19 | Stop reason: HOSPADM

## 2018-06-18 RX ORDER — SODIUM CHLORIDE, SODIUM LACTATE, POTASSIUM CHLORIDE, CALCIUM CHLORIDE 600; 310; 30; 20 MG/100ML; MG/100ML; MG/100ML; MG/100ML
100 INJECTION, SOLUTION INTRAVENOUS CONTINUOUS
Status: DISCONTINUED | OUTPATIENT
Start: 2018-06-18 | End: 2018-06-19 | Stop reason: HOSPADM

## 2018-06-18 RX ORDER — FENTANYL CITRATE 50 UG/ML
50 INJECTION, SOLUTION INTRAMUSCULAR; INTRAVENOUS AS NEEDED
Status: DISCONTINUED | OUTPATIENT
Start: 2018-06-18 | End: 2018-06-19 | Stop reason: HOSPADM

## 2018-06-18 RX ORDER — ACETAMINOPHEN 10 MG/ML
INJECTION, SOLUTION INTRAVENOUS AS NEEDED
Status: DISCONTINUED | OUTPATIENT
Start: 2018-06-18 | End: 2018-06-18 | Stop reason: HOSPADM

## 2018-06-18 RX ORDER — FACIAL-BODY WIPES
10 EACH TOPICAL DAILY PRN
Status: DISCONTINUED | OUTPATIENT
Start: 2018-06-20 | End: 2018-06-19 | Stop reason: HOSPADM

## 2018-06-18 RX ORDER — DIPHENOXYLATE HYDROCHLORIDE AND ATROPINE SULFATE 2.5; .025 MG/1; MG/1
1 TABLET ORAL
Status: DISCONTINUED | OUTPATIENT
Start: 2018-06-18 | End: 2018-06-19 | Stop reason: HOSPADM

## 2018-06-18 RX ORDER — ACETAMINOPHEN 500 MG
1000 TABLET ORAL EVERY 6 HOURS
Status: DISCONTINUED | OUTPATIENT
Start: 2018-06-18 | End: 2018-06-19 | Stop reason: HOSPADM

## 2018-06-18 RX ORDER — FENTANYL CITRATE 50 UG/ML
INJECTION, SOLUTION INTRAMUSCULAR; INTRAVENOUS AS NEEDED
Status: DISCONTINUED | OUTPATIENT
Start: 2018-06-18 | End: 2018-06-18 | Stop reason: HOSPADM

## 2018-06-18 RX ORDER — HYDROMORPHONE HYDROCHLORIDE 1 MG/ML
0.5 INJECTION, SOLUTION INTRAMUSCULAR; INTRAVENOUS; SUBCUTANEOUS
Status: DISCONTINUED | OUTPATIENT
Start: 2018-06-18 | End: 2018-06-18

## 2018-06-18 RX ORDER — HYDROXYZINE HYDROCHLORIDE 10 MG/1
10 TABLET, FILM COATED ORAL
Status: DISCONTINUED | OUTPATIENT
Start: 2018-06-18 | End: 2018-06-19 | Stop reason: HOSPADM

## 2018-06-18 RX ORDER — DIPHENHYDRAMINE HYDROCHLORIDE 50 MG/ML
12.5 INJECTION, SOLUTION INTRAMUSCULAR; INTRAVENOUS AS NEEDED
Status: ACTIVE | OUTPATIENT
Start: 2018-06-18 | End: 2018-06-18

## 2018-06-18 RX ORDER — SODIUM CHLORIDE, SODIUM LACTATE, POTASSIUM CHLORIDE, CALCIUM CHLORIDE 600; 310; 30; 20 MG/100ML; MG/100ML; MG/100ML; MG/100ML
25 INJECTION, SOLUTION INTRAVENOUS CONTINUOUS
Status: DISCONTINUED | OUTPATIENT
Start: 2018-06-19 | End: 2018-06-19 | Stop reason: HOSPADM

## 2018-06-18 RX ORDER — CYCLOBENZAPRINE HCL 10 MG
10 TABLET ORAL
Status: DISCONTINUED | OUTPATIENT
Start: 2018-06-18 | End: 2018-06-19 | Stop reason: HOSPADM

## 2018-06-18 RX ORDER — RANITIDINE 150 MG/1
150 TABLET, FILM COATED ORAL 2 TIMES DAILY
COMMUNITY
End: 2021-02-23

## 2018-06-18 RX ORDER — SODIUM CHLORIDE, SODIUM LACTATE, POTASSIUM CHLORIDE, CALCIUM CHLORIDE 600; 310; 30; 20 MG/100ML; MG/100ML; MG/100ML; MG/100ML
INJECTION, SOLUTION INTRAVENOUS
Status: DISCONTINUED | OUTPATIENT
Start: 2018-06-18 | End: 2018-06-18 | Stop reason: HOSPADM

## 2018-06-18 RX ORDER — ACETAMINOPHEN 10 MG/ML
1000 INJECTION, SOLUTION INTRAVENOUS ONCE
Status: COMPLETED | OUTPATIENT
Start: 2018-06-18 | End: 2018-06-18

## 2018-06-18 RX ORDER — MIDAZOLAM HYDROCHLORIDE 1 MG/ML
0.5 INJECTION, SOLUTION INTRAMUSCULAR; INTRAVENOUS
Status: DISCONTINUED | OUTPATIENT
Start: 2018-06-18 | End: 2018-06-18

## 2018-06-18 RX ORDER — PRAVASTATIN SODIUM 10 MG/1
40 TABLET ORAL
Status: DISCONTINUED | OUTPATIENT
Start: 2018-06-18 | End: 2018-06-19 | Stop reason: HOSPADM

## 2018-06-18 RX ORDER — FAMOTIDINE 20 MG/1
20 TABLET, FILM COATED ORAL 2 TIMES DAILY
Status: DISCONTINUED | OUTPATIENT
Start: 2018-06-18 | End: 2018-06-19 | Stop reason: HOSPADM

## 2018-06-18 RX ORDER — FAMOTIDINE 20 MG/1
20 TABLET, FILM COATED ORAL 2 TIMES DAILY
Status: DISCONTINUED | OUTPATIENT
Start: 2018-06-18 | End: 2018-06-18

## 2018-06-18 RX ORDER — SODIUM CHLORIDE 0.9 % (FLUSH) 0.9 %
5-10 SYRINGE (ML) INJECTION EVERY 8 HOURS
Status: DISCONTINUED | OUTPATIENT
Start: 2018-06-18 | End: 2018-06-19 | Stop reason: HOSPADM

## 2018-06-18 RX ORDER — SODIUM CHLORIDE 0.9 % (FLUSH) 0.9 %
5-10 SYRINGE (ML) INJECTION AS NEEDED
Status: DISCONTINUED | OUTPATIENT
Start: 2018-06-18 | End: 2018-06-19 | Stop reason: HOSPADM

## 2018-06-18 RX ORDER — DEXAMETHASONE SODIUM PHOSPHATE 100 MG/10ML
INJECTION INTRAMUSCULAR; INTRAVENOUS AS NEEDED
Status: DISCONTINUED | OUTPATIENT
Start: 2018-06-18 | End: 2018-06-18 | Stop reason: HOSPADM

## 2018-06-18 RX ORDER — FENTANYL CITRATE 50 UG/ML
25 INJECTION, SOLUTION INTRAMUSCULAR; INTRAVENOUS
Status: DISCONTINUED | OUTPATIENT
Start: 2018-06-18 | End: 2018-06-19 | Stop reason: HOSPADM

## 2018-06-18 RX ORDER — HYDROGEN PEROXIDE 3 %
20 SOLUTION, NON-ORAL MISCELLANEOUS DAILY
COMMUNITY

## 2018-06-18 RX ORDER — NALOXONE HYDROCHLORIDE 1 MG/ML
INJECTION INTRAMUSCULAR; INTRAVENOUS; SUBCUTANEOUS
Status: DISPENSED
Start: 2018-06-18 | End: 2018-06-19

## 2018-06-18 RX ORDER — ONDANSETRON 2 MG/ML
4 INJECTION INTRAMUSCULAR; INTRAVENOUS AS NEEDED
Status: DISCONTINUED | OUTPATIENT
Start: 2018-06-18 | End: 2018-06-19 | Stop reason: HOSPADM

## 2018-06-18 RX ORDER — OXYCODONE HYDROCHLORIDE 5 MG/1
10-15 TABLET ORAL
Status: DISCONTINUED | OUTPATIENT
Start: 2018-06-18 | End: 2018-06-19 | Stop reason: HOSPADM

## 2018-06-18 RX ORDER — DEXAMETHASONE SODIUM PHOSPHATE 4 MG/ML
10 INJECTION, SOLUTION INTRA-ARTICULAR; INTRALESIONAL; INTRAMUSCULAR; INTRAVENOUS; SOFT TISSUE ONCE
Status: DISCONTINUED | OUTPATIENT
Start: 2018-06-19 | End: 2018-06-19 | Stop reason: HOSPADM

## 2018-06-18 RX ORDER — CEFAZOLIN SODIUM 1 G/3ML
INJECTION, POWDER, FOR SOLUTION INTRAMUSCULAR; INTRAVENOUS AS NEEDED
Status: DISCONTINUED | OUTPATIENT
Start: 2018-06-18 | End: 2018-06-18 | Stop reason: HOSPADM

## 2018-06-18 RX ORDER — CEFAZOLIN SODIUM/WATER 2 G/20 ML
2 SYRINGE (ML) INTRAVENOUS ONCE
Status: DISCONTINUED | OUTPATIENT
Start: 2018-06-19 | End: 2018-06-18

## 2018-06-18 RX ORDER — MIDAZOLAM HYDROCHLORIDE 1 MG/ML
INJECTION, SOLUTION INTRAMUSCULAR; INTRAVENOUS AS NEEDED
Status: DISCONTINUED | OUTPATIENT
Start: 2018-06-18 | End: 2018-06-18 | Stop reason: HOSPADM

## 2018-06-18 RX ORDER — PREGABALIN 75 MG/1
150 CAPSULE ORAL ONCE
Status: COMPLETED | OUTPATIENT
Start: 2018-06-18 | End: 2018-06-18

## 2018-06-18 RX ORDER — POLYETHYLENE GLYCOL 3350 17 G/17G
17 POWDER, FOR SOLUTION ORAL DAILY
Status: DISCONTINUED | OUTPATIENT
Start: 2018-06-19 | End: 2018-06-19 | Stop reason: HOSPADM

## 2018-06-18 RX ORDER — ONDANSETRON 2 MG/ML
4 INJECTION INTRAMUSCULAR; INTRAVENOUS
Status: DISCONTINUED | OUTPATIENT
Start: 2018-06-18 | End: 2018-06-19 | Stop reason: HOSPADM

## 2018-06-18 RX ORDER — CEFAZOLIN SODIUM/WATER 2 G/20 ML
2 SYRINGE (ML) INTRAVENOUS ONCE
Status: DISPENSED | OUTPATIENT
Start: 2018-06-18 | End: 2018-06-18

## 2018-06-18 RX ORDER — NEOSTIGMINE METHYLSULFATE 1 MG/ML
INJECTION INTRAVENOUS AS NEEDED
Status: DISCONTINUED | OUTPATIENT
Start: 2018-06-18 | End: 2018-06-18 | Stop reason: HOSPADM

## 2018-06-18 RX ORDER — DICLOFENAC SODIUM 10 MG/G
GEL TOPICAL
Status: DISCONTINUED | OUTPATIENT
Start: 2018-06-18 | End: 2018-06-19 | Stop reason: HOSPADM

## 2018-06-18 RX ORDER — GABAPENTIN 300 MG/1
600 CAPSULE ORAL 2 TIMES DAILY
Status: DISCONTINUED | OUTPATIENT
Start: 2018-06-18 | End: 2018-06-19 | Stop reason: HOSPADM

## 2018-06-18 RX ORDER — KETOROLAC TROMETHAMINE 30 MG/ML
15 INJECTION, SOLUTION INTRAMUSCULAR; INTRAVENOUS EVERY 6 HOURS
Status: DISCONTINUED | OUTPATIENT
Start: 2018-06-18 | End: 2018-06-19 | Stop reason: HOSPADM

## 2018-06-18 RX ORDER — SODIUM CHLORIDE 0.9 % (FLUSH) 0.9 %
5-10 SYRINGE (ML) INJECTION EVERY 8 HOURS
Status: DISCONTINUED | OUTPATIENT
Start: 2018-06-19 | End: 2018-06-19 | Stop reason: HOSPADM

## 2018-06-18 RX ORDER — OXYCODONE HYDROCHLORIDE 5 MG/1
5 TABLET ORAL
Status: DISCONTINUED | OUTPATIENT
Start: 2018-06-18 | End: 2018-06-19 | Stop reason: HOSPADM

## 2018-06-18 RX ORDER — LIDOCAINE HYDROCHLORIDE 20 MG/ML
INJECTION, SOLUTION EPIDURAL; INFILTRATION; INTRACAUDAL; PERINEURAL AS NEEDED
Status: DISCONTINUED | OUTPATIENT
Start: 2018-06-18 | End: 2018-06-18 | Stop reason: HOSPADM

## 2018-06-18 RX ORDER — SUCCINYLCHOLINE CHLORIDE 20 MG/ML
INJECTION INTRAMUSCULAR; INTRAVENOUS AS NEEDED
Status: DISCONTINUED | OUTPATIENT
Start: 2018-06-18 | End: 2018-06-18 | Stop reason: HOSPADM

## 2018-06-18 RX ORDER — NALOXONE HYDROCHLORIDE 0.4 MG/ML
0.4 INJECTION, SOLUTION INTRAMUSCULAR; INTRAVENOUS; SUBCUTANEOUS AS NEEDED
Status: DISCONTINUED | OUTPATIENT
Start: 2018-06-18 | End: 2018-06-19 | Stop reason: HOSPADM

## 2018-06-18 RX ORDER — DULOXETIN HYDROCHLORIDE 30 MG/1
60 CAPSULE, DELAYED RELEASE ORAL DAILY
Status: DISCONTINUED | OUTPATIENT
Start: 2018-06-19 | End: 2018-06-19 | Stop reason: HOSPADM

## 2018-06-18 RX ORDER — ACETAMINOPHEN 10 MG/ML
1000 INJECTION, SOLUTION INTRAVENOUS ONCE
Status: DISCONTINUED | OUTPATIENT
Start: 2018-06-19 | End: 2018-06-18

## 2018-06-18 RX ORDER — PROPOFOL 10 MG/ML
INJECTION, EMULSION INTRAVENOUS AS NEEDED
Status: DISCONTINUED | OUTPATIENT
Start: 2018-06-18 | End: 2018-06-18 | Stop reason: HOSPADM

## 2018-06-18 RX ORDER — DIAZEPAM 5 MG/1
5 TABLET ORAL
Status: DISCONTINUED | OUTPATIENT
Start: 2018-06-18 | End: 2018-06-19 | Stop reason: HOSPADM

## 2018-06-18 RX ORDER — ROPIVACAINE HYDROCHLORIDE 5 MG/ML
30 INJECTION, SOLUTION EPIDURAL; INFILTRATION; PERINEURAL AS NEEDED
Status: DISCONTINUED | OUTPATIENT
Start: 2018-06-18 | End: 2018-06-19 | Stop reason: HOSPADM

## 2018-06-18 RX ORDER — PANTOPRAZOLE SODIUM 40 MG/1
40 TABLET, DELAYED RELEASE ORAL DAILY
Status: DISCONTINUED | OUTPATIENT
Start: 2018-06-19 | End: 2018-06-19 | Stop reason: HOSPADM

## 2018-06-18 RX ORDER — AMOXICILLIN 250 MG
1 CAPSULE ORAL 2 TIMES DAILY
Status: DISCONTINUED | OUTPATIENT
Start: 2018-06-18 | End: 2018-06-19 | Stop reason: HOSPADM

## 2018-06-18 RX ORDER — PREGABALIN 75 MG/1
150 CAPSULE ORAL ONCE
Status: DISCONTINUED | OUTPATIENT
Start: 2018-06-19 | End: 2018-06-18

## 2018-06-18 RX ORDER — ONDANSETRON 2 MG/ML
INJECTION INTRAMUSCULAR; INTRAVENOUS AS NEEDED
Status: DISCONTINUED | OUTPATIENT
Start: 2018-06-18 | End: 2018-06-18 | Stop reason: HOSPADM

## 2018-06-18 RX ORDER — CEFAZOLIN SODIUM/WATER 2 G/20 ML
2 SYRINGE (ML) INTRAVENOUS EVERY 8 HOURS
Status: COMPLETED | OUTPATIENT
Start: 2018-06-18 | End: 2018-06-19

## 2018-06-18 RX ORDER — ROCURONIUM BROMIDE 10 MG/ML
INJECTION, SOLUTION INTRAVENOUS AS NEEDED
Status: DISCONTINUED | OUTPATIENT
Start: 2018-06-18 | End: 2018-06-18 | Stop reason: HOSPADM

## 2018-06-18 RX ORDER — GLYCOPYRROLATE 0.2 MG/ML
INJECTION INTRAMUSCULAR; INTRAVENOUS AS NEEDED
Status: DISCONTINUED | OUTPATIENT
Start: 2018-06-18 | End: 2018-06-18 | Stop reason: HOSPADM

## 2018-06-18 RX ORDER — HYDROMORPHONE HYDROCHLORIDE 2 MG/ML
1 INJECTION, SOLUTION INTRAMUSCULAR; INTRAVENOUS; SUBCUTANEOUS
Status: DISCONTINUED | OUTPATIENT
Start: 2018-06-18 | End: 2018-06-19 | Stop reason: HOSPADM

## 2018-06-18 RX ORDER — SODIUM CHLORIDE 9 MG/ML
125 INJECTION, SOLUTION INTRAVENOUS CONTINUOUS
Status: DISCONTINUED | OUTPATIENT
Start: 2018-06-18 | End: 2018-06-19 | Stop reason: HOSPADM

## 2018-06-18 RX ORDER — LIDOCAINE HYDROCHLORIDE 10 MG/ML
0.1 INJECTION, SOLUTION EPIDURAL; INFILTRATION; INTRACAUDAL; PERINEURAL AS NEEDED
Status: DISCONTINUED | OUTPATIENT
Start: 2018-06-18 | End: 2018-06-19 | Stop reason: HOSPADM

## 2018-06-18 RX ADMIN — FAMOTIDINE 20 MG: 20 TABLET, FILM COATED ORAL at 21:27

## 2018-06-18 RX ADMIN — Medication 2 G: at 21:27

## 2018-06-18 RX ADMIN — SUCCINYLCHOLINE CHLORIDE 140 MG: 20 INJECTION INTRAMUSCULAR; INTRAVENOUS at 13:02

## 2018-06-18 RX ADMIN — ACETAMINOPHEN 1000 MG: 10 INJECTION, SOLUTION INTRAVENOUS at 13:31

## 2018-06-18 RX ADMIN — NEOSTIGMINE METHYLSULFATE 3 MG: 1 INJECTION INTRAVENOUS at 14:07

## 2018-06-18 RX ADMIN — ONDANSETRON 4 MG: 2 INJECTION INTRAMUSCULAR; INTRAVENOUS at 13:14

## 2018-06-18 RX ADMIN — ACETAMINOPHEN 1000 MG: 500 TABLET, FILM COATED ORAL at 18:49

## 2018-06-18 RX ADMIN — PREGABALIN 150 MG: 75 CAPSULE ORAL at 11:26

## 2018-06-18 RX ADMIN — PRAVASTATIN SODIUM 40 MG: 10 TABLET ORAL at 21:29

## 2018-06-18 RX ADMIN — PROPOFOL 150 MG: 10 INJECTION, EMULSION INTRAVENOUS at 13:02

## 2018-06-18 RX ADMIN — ROCURONIUM BROMIDE 20 MG: 10 INJECTION, SOLUTION INTRAVENOUS at 13:35

## 2018-06-18 RX ADMIN — KETOROLAC TROMETHAMINE 15 MG: 30 INJECTION, SOLUTION INTRAMUSCULAR at 18:49

## 2018-06-18 RX ADMIN — SODIUM CHLORIDE, SODIUM LACTATE, POTASSIUM CHLORIDE, CALCIUM CHLORIDE: 600; 310; 30; 20 INJECTION, SOLUTION INTRAVENOUS at 12:56

## 2018-06-18 RX ADMIN — SODIUM CHLORIDE 125 ML/HR: 900 INJECTION, SOLUTION INTRAVENOUS at 15:23

## 2018-06-18 RX ADMIN — DEXAMETHASONE SODIUM PHOSPHATE 4 MG: 100 INJECTION INTRAMUSCULAR; INTRAVENOUS at 13:14

## 2018-06-18 RX ADMIN — Medication 10 ML: at 21:30

## 2018-06-18 RX ADMIN — CEFAZOLIN SODIUM 2 G: 1 INJECTION, POWDER, FOR SOLUTION INTRAMUSCULAR; INTRAVENOUS at 13:14

## 2018-06-18 RX ADMIN — GLYCOPYRROLATE 0.5 MG: 0.2 INJECTION INTRAMUSCULAR; INTRAVENOUS at 14:07

## 2018-06-18 RX ADMIN — SODIUM CHLORIDE, SODIUM LACTATE, POTASSIUM CHLORIDE, AND CALCIUM CHLORIDE 25 ML/HR: 600; 310; 30; 20 INJECTION, SOLUTION INTRAVENOUS at 11:45

## 2018-06-18 RX ADMIN — FENTANYL CITRATE 100 MCG: 50 INJECTION, SOLUTION INTRAMUSCULAR; INTRAVENOUS at 13:03

## 2018-06-18 RX ADMIN — MIDAZOLAM HYDROCHLORIDE 2 MG: 1 INJECTION, SOLUTION INTRAMUSCULAR; INTRAVENOUS at 12:58

## 2018-06-18 RX ADMIN — GABAPENTIN 600 MG: 300 CAPSULE ORAL at 21:27

## 2018-06-18 RX ADMIN — LIDOCAINE HYDROCHLORIDE 20 MG: 20 INJECTION, SOLUTION EPIDURAL; INFILTRATION; INTRACAUDAL; PERINEURAL at 13:03

## 2018-06-18 RX ADMIN — SODIUM CHLORIDE, SODIUM LACTATE, POTASSIUM CHLORIDE, CALCIUM CHLORIDE: 600; 310; 30; 20 INJECTION, SOLUTION INTRAVENOUS at 14:13

## 2018-06-18 RX ADMIN — SENNOSIDES AND DOCUSATE SODIUM 1 TABLET: 8.6; 5 TABLET ORAL at 18:49

## 2018-06-18 RX ADMIN — ROCURONIUM BROMIDE 10 MG: 10 INJECTION, SOLUTION INTRAVENOUS at 13:03

## 2018-06-18 RX ADMIN — ACETAMINOPHEN 1000 MG: 10 INJECTION, SOLUTION INTRAVENOUS at 11:46

## 2018-06-18 NOTE — ANESTHESIA PREPROCEDURE EVALUATION
Anesthetic History   No history of anesthetic complications            Review of Systems / Medical History  Patient summary reviewed, nursing notes reviewed and pertinent labs reviewed    Pulmonary  Within defined limits                 Neuro/Psych         Psychiatric history     Cardiovascular  Within defined limits                Exercise tolerance: >4 METS     GI/Hepatic/Renal     GERD           Endo/Other        Arthritis and cancer     Other Findings            Physical Exam    Airway  Mallampati: III  TM Distance: 4 - 6 cm  Neck ROM: normal range of motion   Mouth opening: Normal     Cardiovascular  Regular rate and rhythm,  S1 and S2 normal,  no murmur, click, rub, or gallop             Dental    Dentition: Full upper dentures and Full lower dentures     Pulmonary  Breath sounds clear to auscultation               Abdominal  GI exam deferred       Other Findings            Anesthetic Plan    ASA: 2  Anesthesia type: general          Induction: Intravenous  Anesthetic plan and risks discussed with: Patient

## 2018-06-18 NOTE — H&P
Progress notes     Expand All Collapse All    Subjective:      Patient ID: Hal Matta is a 63 y.o. female.     Chief Complaint: Pain of the Lower Back        HPI:  Hal Matta is a 63 y.o. female with complaints of low back pain radiating down the right leg laterally into the great toe. The pain has been present for one month with no known injury. It is described as sharp, dull and throbbing and is there constantly. It is worse with sitting  and better with standing but she is in constant severe pain. Hal Matta has tried right L4-L5 and L5-S1 ESIs with Dr. Cary Guaman which did not help at all. She is taking norco.  She tried taking gabapentin and a medrol dosepak. Nothing has relieved her pain. The pain is rated 10 out of 10 on the VAS.   She has no pain in the left leg.              Patient Active Problem List     Diagnosis Date Noted    Sacroiliitis 03/22/2018               Family History   Problem Relation Age of Onset    No Known Problems Mother      Diabetes Father      No Known Problems Brother      No Known Problems Sister      No Known Problems Son      No Known Problems Daughter                  Social History   Substance Use Topics    Smoking status: Current Every Day Smoker       Types: Cigarettes    Smokeless tobacco: Never Used    Alcohol use Yes          Medical History        Past Medical History:   Diagnosis Date    Bleeding disorder      Cancer      Fibromyalgia, primary      GERD (gastroesophageal reflux disease)      GI (gastrointestinal bleed)      Inflammatory bowel disease      Osteoarthritis              Surgical History         Past Surgical History:   Procedure Laterality Date    FOOT SURGERY        HAND SURGERY        KNEE SURGERY        NO RELEVANT SURGERIES        SHOULDER SURGERY        SPINE SURGERY                   Current Outpatient Prescriptions:     cyclobenzaprine (FLEXERIL) 10 MG tablet, , Disp: , Rfl:     dexamethasone (DECADRON) 4 MG tablet, Take 1 tablet (4 mg total) by mouth daily with breakfast for 7 days. , Disp: 7 tablet, Rfl: 0    diphenoxylate-atropine (LOMOTIL) 2.5-0.025 MG per tablet, , Disp: , Rfl:     DULoxetine (CYMBALTA) 60 MG capsule, , Disp: , Rfl:     gabapentin (NEURONTIN) 300 MG capsule, Take 4 capsules (1,200 mg total) by mouth 3 (three) times a day., Disp: 360 capsule, Rfl: 11    HYDROcodone-acetaminophen (NORCO) 5-325 MG per tablet, Take 1 tablet by mouth every 6 (six) hours as needed for moderate pain., Disp: 20 tablet, Rfl: 0    hyoscyamine (ANASPAZ,LEVSIN) 0.125 MG tablet, TAKE 1 TABLET BY MOUTH 3 TIMES A DAY AS NEEDED FOR PAIN, Disp: , Rfl: 0    LORazepam (ATIVAN) 1 MG tablet, Take one pill 2 hours prior to injection, may take one more pill 30 minutes prior to injection if needed, Disp: 2 tablet, Rfl: 0    simvastatin (ZOCOR) 20 MG tablet, , Disp: , Rfl:     traZODone (DESYREL) 50 MG tablet, , Disp: , Rfl:     triamcinolone (KENALOG) 0.1 % cream, , Disp: , Rfl:            Allergies   Allergen Reactions    Aspirin Anaphylaxis       Other reaction(s): Other (comments)  Blacked out.         ROS:   No new bowel or bladder incontinence. No fever. No saddle anesthesia. Objective:          Vitals:     06/07/18 1658   BP: 121/71         Body mass index is 27.44 kg/m². , a BMI over 30 is considered obese and a BMI over 40 has been associated with a higher risk of surgical complications.     Constitutional: The patient is in severe pain grimacing and shifting from seated to standing every minute or so. Well nourished. HEENT: Normocephalic. Respiratory:  No labored breathing. Cardiovascular:  No marked cyanosis. Skin:  No marked skin ulcers/lesions on bilateral upper or lower extremities. Psychiatric: Alert and oriented x3. Inspection: No gross deformity of bilateral upper or lower extremities. Musculoskeletal/Neurological:   Gait/balance:  - Stooped forward. Shifting uncomfortable with her walker.  Unable to stand on heel on right foot. Thoracolumbar spine:  - No tenderness to palpation  - Full range of motion. Right lower extremity:  - No tenderness to palpation   - Full range of motion  - No pain with internal/external rotation of the hip  - Strength:  - 5 out of 5 to hip flexors  - 5 out of 5 to quads  - 4 out of 5 to TA  - 3 out of 5 to EHL  - 5 out of 5 to Gastroc/Soleus  - Negative straight leg raise  Left lower extremity:  - No tenderness to palpation   - Full range of motion  - No pain with internal/external rotation of the hip  - Strength:  - 5 out of 5 to hip flexors  - 5 out of 5 to quads  - 5 out of 5 to TA  - 5 out of 5 to EHL  - 5 out of 5 to Gastroc/Soleus  - Negative straight leg raise  Sensation:  - Intact to light touch  Reflexes:  - +2 Patellar tendon   - +2 Achilles tendon            Radiographs:           X-ray Lumbar Spine Complete 4+ Views ([id number])     Result Date: 5/10/2018  Shielding: N/A. Standing. AP, Lat, Flexion, Extension.      Notes  X-rays of the Lumbosacral spine were reviewed by me in the office today. Films reveal:  Films reveal moderate degenerative changes with loss of disc space height   in the lower lumbar spine. No fractures, malalignment or instability.                  Mri Lumbar Spine Without Contrast (22091)     Result Date: 2018     Jude Restrepo - [facility] - MRI LUMB SPINE WO CONT  Patient: Andre Longoria  : 1955  Date of Service: 2018 12:06:44 PM  Reason For Exam: DDD (degenerative disc disease), lumbar  Ordering Provider: Luciana Hollins  Reading Physician: Jaclyn Pittman  Signing Date: 2018 12:35:16 PM     CLINICAL HISTORY: Lumbar radiculopathy     INDICATION: Lower back pain     COMPARISON: 2017     TECHNIQUE: MR imaging of the lumbar spine was performed with sagittal T1, T2,  STIR;  axial T1, T2. Contrast was not administered.     FINDINGS:  Minimal retrolisthesis of L5 on S1. Abdominal aorta normal in caliber.  Proximal  common I vessels normal in caliber. There is no acute fracture or dislocation. Left renal cyst.        L1-L2: There is minimal desiccation of this disc without stenosis     L2-L3: No stenosis     L3-L4: Disc bulge. Minimal ligamentum flavum hypertrophy. Canal is patent. Minimal left foraminal stenosis.     L4-L5: Disc desiccation. Disc bulge. Minimal canal stenosis. There is suggestion  of a small disc extrusion superimposed on a right foraminal protrusion at the  right lateral recess which is caudally oriented. Effacement of the right lateral  recess posterior to the L5 vertebral body. The foramina is patent. There is  effacement of nerve roots extending to the right and left L5-S1 foramina.     L5-S1: There is degeneration of this disc with a borderline disc bulge  asymmetric to the right. Mild thickening of the ligamentum flavum with facet  hypertrophy. There is narrowing of the subarticular zones bilaterally asymmetric  to the right with mild right foraminal narrowing. No interval change.           IMPRESSION:  Likely new small right lateral recess/right foraminal disc extrusion caudally  oriented at L4-L5. Severe right lateral recess stenosis posterior to L5. The  canal demonstrates no central canal stenosis. There is however increased  effacement of nerve roots extending to the right L5-S1 foramen since the  previous examination in 2017. If clinically warranted contrast-enhanced MRI of  the lumbar spine for full delineation.     Otherwise no change.                 X-ray Hip W/ Pelvis 2-3 Views Right ([id number])     Result Date: 5/10/2018  Shielding: N/A. AP Pelvis, AP, Frog Lateral.      Notes  Films of the Hip were reviewed today. Films reveal:  Films reveal mild degenerative changes. No fractures, malalignment or   instability. Joint spaces are well preserved.           Assessment:          ICD-10-CM   1. Sciatica of right side M54.31   2. Fibromyalgia M79.7   3.  Low back pain, unspecified back pain laterality, unspecified chronicity, with sciatica presence unspecified M54.5   4. Displacement of lumbar intervertebral disc without myelopathy M51.26         Plan:      I discussed treatment options with Ms. Shima Mcginnis today. She is in severe intolerable pain and had no improvement with medications or ESIs. She cannot tolerate therapy and she has 3/5 weakness in the right EHL and 4/5 weakness in the tibialis anterior. Today she was scheduled for a right L4-L5 microdiskectomy. I increased her gabapentin today and I prescribed decadron to hopefully give her some relief until surgery.      I have discussed the procedure in detail with the patient and mentioned complications, including but not limited to: death, permanent disability, heart attack, stroke, lung injury or infection, blindness, ileus, bladder or bowel problems, ureter injury, bleeding, nerve injury (including numbness, pain and weakness), paralysis (which may be permanent), failure to heal, failure to fuse bone together in fusion procedures, failure to relief symptoms, failure to relief pain, increased pain, need for further surgeries, failure or breakage or hardware, malpositioning of hardware, need to fuse or operate on additional levels determined either during or after surgery, destabilization of the spine (which may require fusion or later surgery), infections (which may or may not require additional surgery), dural tears (tears of the sac holding in nerves and spinal fluid), meningitis, voice changes, vocal cord injury, hoarseness, blood clots, pulmonary embolus, Rony syndrome, recurrent disc herniation, diaphragm paralysis, and anesthetic complications. Comorbidities such as obesity, smoking, rheumatoid arthritis, chronic steroid use and diabetes increase these risks. The patient understands and wants to proceed.      The patient has been prescribed a LSO spinal orthosis.  The orthosis is medically necessary to reduce pain by restricting mobility of the trunk and to otherwise support weak spinal muscles and/or deformed spine. The patient will meet with our bracing coordinator to be fit for the brace.                Orders Placed This Encounter    gabapentin (NEURONTIN) 300 MG capsule    dexamethasone (DECADRON) 4 MG tablet      Return for Follow up 2 weeks after surgery.      Charting performed by Brittanie Michelle in the presence of Soumya Harry MD.  Soumya Wang MD, personally performed the services described in this documentation, as recorded by the scribe in my presence and it accurately and completely records my words and actions.     This note has been transcribed electronically using voice recognition and a trained scribe. It is believed to be accurate, but may contain errors secondary to technological limitations and other factors.

## 2018-06-18 NOTE — OP NOTES
[address] REPORT    Ernie Barton  MR#: [id number]  : 1955  ACCOUNT #: [de-identified]   DATE OF SERVICE: 2018    PREOPERATIVE DIAGNOSES:  1. L4-5 disk herniation. 2.  Right lower extremity sciatica. 3.  Lumbago. POSTOPERATIVE DIAGNOSES:  1. L4-5 disk herniation. 2.  Right lower extremity sciatica. 3.  Lumbago. PROCEDURES PERFORMED:  1. L4-L5 microdiscectomy. 2.  Use of operating microscope. SURGEON:  Mateusz Griffith MD    ASSISTANT:  Nat Murdock PA-C      ESTIMATED BLOOD LOSS:  25 mL. COMPLICATIONS:  None. SPECIMENS REMOVED:  None. ANESTHESIA:  General.    DRAINS:  None. IMPLANTS:  None. INDICATIONS FOR THE PROCEDURE:  The patient is a pleasant 66year-old lady with an L4-5 disk herniation on the right side resulting in lumbago and sciatica that has failed to improve with nonoperative treatment. At this point, she would like to proceed with surgical intervention. I have given her warnings about the possible complications, including but not limited to pain, scar, bleeding, infection, nonunion, damage to surrounding structures, death, paralysis, blindness, and stroke. She understands and wants to proceed. DESCRIPTION OF PROCEDURE:  Informed consent was obtained and the operative site was properly marked. The patient was moved back to operating room and underwent general endotracheal anesthesia. She was positioned prone on the operating room table using the [employer] frame. Her arms were placed in the 90/90 position. Knees were gently bent with pillows. Fluoroscopy was used to jamee the level of the incision. We then proceeded to prep and drape in the usual manner. Timeout was obtained verifying that this was the correct patient, the correct surgery and the correct site, as well that she had received antibiotics within 30 minutes of the incision.   I then proceeded to perform a standard posterior approach to the lumbar spine, exposing the area between L4 and L5 on the right side. Fluoroscopy was used to verify that we were in the correct level. We then proceeded to bring in the operative microscope and keep it in place for the remainder of the procedure. A self-retaining retractor was placed in position and then Brice Joseline was used to perform a U-cut laminotomy at L4. The bone was removed with a pituitary and I detached the ligamentum flavum from the superior leading edge with a curved curette and flipped it into the bony defect. It was removed with a Kerrison #3, followed by Kerrison #4, exposing the thecal sac and epidural fat. I was able then to gently retract the thecal sac medially and using a Bradford, I was able to remove the free fragment distally alongside the pedicle of L5. After the fragment was flipped into the defect, it was removed. I was able to do that 3 times for 3 large fragments and I was then able to verify with a Jones ball that there were no further disk fragments and no further impingement on the thecal sac. Once we were satisfied, I irrigated the wound with 3 liters of normal saline, verified that hemostasis was obtained. Closed the fascia with #1 Vicryl figure-of-eight interrupted sutures, followed by irrigating the subcu, closed the subcu with 2-0 Vicryl and the skin with a 3-0 running Monocryl and Dermabond. Sterile dressing was applied. The patient was then awakened and transferred to PACU in stable condition. POSTOPERATIVE PLAN:  The patient is going to either go home later today or remain here overnight. We are going to give her SCDs and RAJ conwaye for DVT prophylaxis and Ancef for infection prophylaxis. MD DONYA Sorto / JOHN  D: 06/18/2018 14:01     T: 06/18/2018 14:33  JOB #: [id number]  CC:  [facility]  CC: Dina Maria Elena NP

## 2018-06-18 NOTE — BRIEF OP NOTE
BRIEF OPERATIVE NOTE    Date of Procedure: 6/18/2018   Preoperative Diagnosis: LUMBAGO  HNP   RADICULOPATHY   Postoperative Diagnosis: LUMBAGO  HNP   RADICULOPATHY     Procedure(s):  RIGHT L4-L5 DISCECTOMY  Surgeon(s) and Role:     * Geo Rutherford MD - Primary         Surgical Assistant: Sean Bell    Surgical Staff:  Sofi Cotton: Joey Noguera  Physician Assistant: LAVELL Spangler  Radiology Technician: RT Ana  Scrub Tech-1: Ly Garcia  Event Time In   Incision Start 1335   Incision Close      Anesthesia: General   Estimated Blood Loss: 25cc  Specimens: * No specimens in log *   Findings: HNP   Complications: None  Implants: * No implants in log *

## 2018-06-18 NOTE — PROGRESS NOTES
Physical Therapy Note    Orders received. Spoke with NP who reports that patient is barely awake and both in agreement for PT evaluation to be initiated tomorrow. Will defer and follow back tomorrow.     Thank you,  Oscar Smith, PT, DPT

## 2018-06-18 NOTE — PERIOP NOTES
Patient: Todd Ortiz MRN: [id number]  SSN: [id number]   YOB: 1955  Age: 63 y.o. Sex: female     Patient is status post Procedure(s):  RIGHT L4-L5 DISCECTOMY. Surgeon(s) and Role:     * Obey Wright MD - Primary    Local/Dose/Irrigation:  100ml lange solution with morphine                  Peripheral IV 06/18/18 Right Hand (Active)   Site Assessment Clean, dry, & intact 6/18/2018 11:43 AM   Phlebitis Assessment 0 6/18/2018 11:43 AM   Infiltration Assessment 0 6/18/2018 11:43 AM   Dressing Status Clean, dry, & intact 6/18/2018 11:43 AM   Dressing Type Tape;Transparent 6/18/2018 11:43 AM   Hub Color/Line Status Pink; Infusing 6/18/2018 11:43 AM            Airway - Endotracheal Tube Oral (Active)                   Dressing/Packing:  Wound Back-DRESSING TYPE: Sutures; Topical skin adhesive/glue (06/18/18 1351)  Splint/Cast:  ]

## 2018-06-18 NOTE — PROGRESS NOTES
Ortho/ NeuroSurgery NP Note    POD# 0  s/p RIGHT L4-L5 DISCECTOMY     Pt resting in bed. No complaints. No pain. VSS Afebrile. Patient has had something to eat. No nausea. Most Recent Labs:   Lab Results   Component Value Date/Time    HGB 12.9 06/14/2018 12:06 PM    Hemoglobin A1c 6.0 06/14/2018 12:06 PM       MRSA Screen Pre-op Negative  U/A Screen Pre-Op Negative    Body mass index is 27.02 kg/(m^2). Reference: BMI greater than 30 is classified as obesity and greater than 40 is classified as morbid obesity. STOP BANG Score: 1  Incentive Spirometer Pre-Op Volume: 2500    Voiding status: Patient is voiding in adequate amounts. Dressing c.d.i    Calves soft and supple; No pain with passive stretch  Sensation and motor intact  SCDs for mechanical DVT proph    Plan:  1) PT BID starting today  2) Chrissie-op Antibiotics Ancef  3) Pepcid for GI Prophylaxis   4) Discharge plans to home- she lives with her  but states \"I am on my own\", has a friend helping her until the 26th.      Jareth Saldana NP

## 2018-06-18 NOTE — PERIOP NOTES
Handoff Report from Operating Room to PACU    Report received from Omid Sosa RN and ROSIE Cruz CRNA regarding [facility] [employer]. Surgeon(s):  Piyush Steen MD  And Procedure(s) (LRB):  RIGHT L4-L5 DISCECTOMY (Right)  confirmed   with allergies and dressings discussed. Anesthesia type, drugs, patient history, complications, estimated blood loss, vital signs, intake and output, and last pain medication, lines, reversal medications and temperature were reviewed.

## 2018-06-18 NOTE — ANESTHESIA POSTPROCEDURE EVALUATION
Post-Anesthesia Evaluation and Assessment    Patient: Luba Magallanes MRN: [id number]  SSN: [id number]    YOB: 1955  Age: 63 y.o. Sex: female       Cardiovascular Function/Vital Signs  Visit Vitals    /62    Pulse 90    Temp 36.3 °C (97.4 °F)    Resp 12    Ht 5' 2\" (1.575 m)    Wt 67 kg (147 lb 11.3 oz)    SpO2 93%    BMI 27.02 kg/m2       Patient is status post general anesthesia for Procedure(s):  RIGHT L4-L5 DISCECTOMY. Nausea/Vomiting: None    Postoperative hydration reviewed and adequate. Pain:  Pain Scale 1: Numeric (0 - 10) (06/18/18 1440)  Pain Intensity 1: 0 (06/18/18 1440)   Managed    Neurological Status:   Neuro (WDL): Exceptions to WDL (06/18/18 1440)  Neuro  Neurologic State: Drowsy; Eyes open to voice (06/18/18 1440)  Orientation Level: Oriented to person (06/18/18 1429)  Cognition: Decreased attention/concentration; Follows commands (06/18/18 1429)  LUE Motor Response: Purposeful (06/18/18 1429)  LLE Motor Response: Purposeful (06/18/18 1429)  RUE Motor Response: Purposeful (06/18/18 1429)  RLE Motor Response: Purposeful (06/18/18 1429)   At baseline    Mental Status and Level of Consciousness: Arousable    Pulmonary Status:   O2 Device: Nasal cannula (06/18/18 1440)   Adequate oxygenation and airway patent    Complications related to anesthesia: None    Post-anesthesia assessment completed.  No concerns    Signed By: Jose Thurman MD     June 18, 2018

## 2018-06-18 NOTE — PERIOP NOTES
TRANSFER - OUT REPORT:    Verbal report given to Alpa Patel RN(name) on [facility]  being transferred to [facility] and the [facility](unit) for routine post - op       Report consisted of patients Situation, Background, Assessment and   Recommendations(SBAR). Information from the following report(s) SBAR, OR Summary, Intake/Output and MAR was reviewed with the receiving nurse. Opportunity for questions and clarification was provided.       Patient transported with:   [employer]

## 2018-06-18 NOTE — IP AVS SNAPSHOT
[address] [facility] 
[phone] Patient: Luba Magallanes MRN: [id number] SJN:[id number] About your hospitalization You were admitted on:  June 18, 2018 You last received care in the:  [facility] 3 ORTHOPEDICS You were discharged on:  June 19, 2018 Why you were hospitalized Your primary diagnosis was:  S/P Discectomy Your diagnoses also included:  Hnp (Herniated Nucleus Pulposus), Lumbar Follow-up Information Follow up With Details Comments Contact Info Kaushik Schultz MD In 2 weeks  [address] [facility] 
[phone] Kirsty De Paz NP   [address] [address] 
[phone] Discharge Orders None A check jamee indicates which time of day the medication should be taken. My Medications START taking these medications Instructions Each Dose to Equal  
 Morning Noon Evening Bedtime  
 acetaminophen 500 mg tablet Commonly known as:  TYLENOL Your last dose was: Your next dose is: Take 2 Tabs by mouth every six (6) hours for 14 days. 1000 mg  
    
   
   
   
  
 oxyCODONE IR 5 mg immediate release tablet Commonly known as:  Amina Kimberly Your last dose was: Your next dose is: Take 1-2 Tabs by mouth every four (4) hours as needed. Max Daily Amount: 60 mg.  
 5-10 mg  
    
   
   
   
  
 polyethylene glycol 17 gram packet Commonly known as:  Abimael Holster Your last dose was: Your next dose is: Take 1 Packet by mouth daily as needed (constipation) for up to 15 days. 17 g  
    
   
   
   
  
 senna-docusate 8.6-50 mg per tablet Commonly known as:  Carolyn Evans Your last dose was: Your next dose is: Take 1 Tab by mouth daily. 1 Tab CONTINUE taking these medications Instructions Each Dose to Equal  
 Morning Noon Evening Bedtime  
 cyclobenzaprine 10 mg tablet Commonly known as:  FLEXERIL Your last dose was: Your next dose is: Take 1 Tab by mouth three (3) times daily as needed for Muscle Spasm(s). 10 mg  
    
   
   
   
  
 diphenhydrAMINE 50 mg tablet Commonly known as:  BENADRYL Your last dose was: Your next dose is: Take 100 mg by mouth two (2) times a day. Indications: ALLERGIC REACTIONS  
 100 mg DULoxetine 60 mg capsule Commonly known as:  CYMBALTA Your last dose was: Your next dose is: TAKE 2 CAPSULES DAILY  
     
   
   
   
  
 GABAPENTIN PO Your last dose was: Your next dose is: Take 1,200 mg by mouth three (3) times daily. 1200 mg INCRUSE ELLIPTA 62.5 mcg/actuation inhaler Generic drug:  umeclidinium Your last dose was: Your next dose is: Take 1 Puff by inhalation daily. 1 Puff LOMOTIL 2.5-0.025 mg per tablet Generic drug:  diphenoxylate-atropine Your last dose was: Your next dose is: Take 1 Tab by mouth four (4) times daily as needed. 1 Tab NexIUM 20 mg capsule Generic drug:  esomeprazole Your last dose was: Your next dose is: Take 20 mg by mouth daily. Indications: Heartburn  
 20 mg  
    
   
   
   
  
 VOLTAREN 1 % Gel Generic drug:  diclofenac Your last dose was: Your next dose is:    
   
   
 Apply  to affected area as needed. ZANTAC 150 mg tablet Generic drug:  raNITIdine Your last dose was: Your next dose is: Take 150 mg by mouth two (2) times a day. Indications: Heartburn 150 mg  
    
   
   
   
  
 ZOCOR PO Your last dose was: Your next dose is: Take 20 mg by mouth nightly. 20 mg Where to Get Your Medications Information on where to get these meds will be given to you by the nurse or doctor. ! Ask your nurse or doctor about these medications  
  acetaminophen 500 mg tablet  
 oxyCODONE IR 5 mg immediate release tablet  
 polyethylene glycol 17 gram packet  
 senna-docusate 8.6-50 mg per tablet Opioid Education Prescription Opioids: What You Need to Know: 
 
Prescription opioids can be used to help relieve moderate-to-severe pain and are often prescribed following a surgery or injury, or for certain health conditions. These medications can be an important part of treatment but also come with serious risks. Opioids are strong pain medicines. Examples include hydrocodone, oxycodone, fentanyl, and morphine. Heroin is an example of an illegal opioid. It is important to work with your health care provider to make sure you are getting the safest, most effective care. WHAT ARE THE RISKS AND SIDE EFFECTS OF OPIOID USE? Prescription opioids carry serious risks of addiction and overdose, especially with prolonged use. An opioid overdose, often marked by slow breathing, can cause sudden death. The use of prescription opioids can have a number of side effects as well, even when taken as directed. · Tolerance-meaning you might need to take more of a medication for the same pain relief · Physical dependence-meaning you have symptoms of withdrawal when the medication is stopped. Withdrawal symptoms can include nausea, sweating, chills, diarrhea, stomach cramps, and muscle aches. Withdrawal can last up to several weeks, depending on which drug you took and how long you took it. · Increased sensitivity to pain · Constipation · Nausea, vomiting, and dry mouth · Sleepiness and dizziness · Confusion · Depression · Low levels of testosterone that can result in lower sex drive, energy, and strength · Itching and sweating RISKS ARE GREATER WITH:      
· History of drug misuse, substance use disorder, or overdose · Mental health conditions (such as depression or anxiety) · Sleep apnea · Older age (72 years or older) · Pregnancy Avoid alcohol while taking prescription opioids. Also, unless specifically advised by your health care provider, medications to avoid include: · Benzodiazepines (such as Xanax or Valium) · Muscle relaxants (such as Soma or Flexeril) · Hypnotics (such as Ambien or Lunesta) · Other prescription opioids KNOW YOUR OPTIONS Talk to your health care provider about ways to manage your pain that don't involve prescription opioids. Some of these options may actually work better and have fewer risks and side effects. Options may include: 
· Pain relievers such as acetaminophen, ibuprofen, and naproxen · Some medications that are also used for depression or seizures · Physical therapy and exercise · Counseling to help patients learn how to cope better with triggers of pain and stress. · Application of heat or cold compress · Massage therapy · Relaxation techniques Be Informed Make sure you know the name of your medication, how much and how often to take it, and its potential risks & side effects. IF YOU ARE PRESCRIBED OPIOIDS FOR PAIN: 
· Never take opioids in greater amounts or more often than prescribed. Remember the goal is not to be pain-free but to manage your pain at a tolerable level. · Follow up with your primary care provider to: · Work together to create a plan on how to manage your pain. · Talk about ways to help manage your pain that don't involve prescription opioids. · Talk about any and all concerns and side effects. · Help prevent misuse and abuse. · Never sell or share prescription opioids · Help prevent misuse and abuse.  
· Store prescription opioids in a secure place and out of reach of others (this may include visitors, children, friends, and family). · Safely dispose of unused/unwanted prescription opioids: Find your community drug take-back program or your pharmacy mail-back program, or flush them down the toilet, following guidance from the Food and Drug Administration (www.fda.gov/Drugs/ResourcesForYou). · Visit www.cdc.gov/drugoverdose to learn about the risks of opioid abuse and overdose. · If you believe you may be struggling with addiction, tell your health care provider and ask for guidance or call [address] [place] [address] at [phone]. Discharge Instructions [address] [facility] Shakir Kris Discharge Instruction Sheet :   Microdiskectomy Kerry Johnston. Bravo Goldstein Pain control: 
Typically, we will prescribe a narcotic usually 1-2 tabs every four hours is sufficient for the pain. Most patients need this only for the first few weeks. You should discontinue this as the pain decreases. You should not drive while taking any narcotic pain medications. Constipation Pain medicines and anesthesia can be constipating-this can be prevented by gentle physical activity and drinking plenty of fluid. It should be treated with over-the-counter medications such as Miralax or suppositories, and/or Fleets enema. You should have a bowel movement at least every other day following surgery. Incision care Keep this area clean and dry. Leave the current dressing in place for 7 days. You may shower with this dressing, but DO NOT take a tub bath or go swimming until cleared by your doctor. DO NOT apply lotions, oils, or creams to incision. After 7 days, remove this dressing and apply a new opsite (impermiable)  Dressing over the incision. This dressing may stay on for 7 more days (until your follow up visit with your surgeon). If staples are in place, they should be removed about 14-20 days after surgery. To increase and promote healing: 
? Stop Smoking (or at least cut back on smoking). ? Eat a well-balanced diet (high in protein and vitamin C) ? If your appetite is poor, consider nutritional supplements like Ensure, Glucerna, or Burns [place] Instant Breakfast. 
? If you are diabetic, controlling you blood sugars is very important to prevent infection and promote wound healing. Nutrition: ? If you were on a supplement such as Ensure or Glucerna) while in the hospital, please continue using them with each meal for the next 30 days. ? Eat a well-balanced diet - High in protein, high in vitamins and minerals, especially vitamin C and zinc.  
 
Restrictions: 
Limited bending at waist 
Lift no more than 10 pounds Warning signs : Please call your physician immediately at [phone] if you have ? Bleeding from incision that is constant. ? Change in mental status (unusual behavior or confusion) ? If your incision develops redness or swelling 
? Change in wound drainage (increase in amount, color, or foul odor) ? [place] over 101.5 degrees Fahrenheit  
? Headache that is not relieved with pain medication ? Tenderness or redness in the calf of your leg Emergency: CALL 911 if you have ? Shortness of breath ? Chest pain ? Localized chest pain when coughing or taking a deep breath Follow-up Please call Dr. Dick Friday office for a follow up appointment in 2 weeks at [phone]. You can return to work when cleared by a physician. During normal business hours you may reach Dr. Jackson Officer' team directly at [phone] if you have concerns or questions. Shakir Ponce [employer] Announcement We are excited to announce that we are making your provider's discharge notes available to you in [employer].   You will see these notes when they are completed and signed by the physician that discharged you from your recent hospital stay. If you have any questions or concerns about any information you see in [employer], please call the Health Information Department where you were seen or reach out to your Primary Care Provider for more information about your plan of care. Introducing [facility]! Dear Mode Maloney: 
Thank you for requesting a [employer] account. Our records indicate that you already have an active [employer] account. You can access your account anytime at [email or web address] Did you know that you can access your hospital and ER discharge instructions at any time in [employer]? You can also review all of your test results from your hospital stay or ER visit. Additional Information If you have questions, please visit the Frequently Asked Questions section of the [employer] website at [email or web address] Remember, [employer] is NOT to be used for urgent needs. For medical emergencies, dial 911. Now available from your iPhone and Android! Introducing Gurjit Gonzalez As a [employer] patient, I wanted to make you aware of our electronic visit tool called Gurjit Gonzalez. [employer] 24/7 allows you to connect within minutes with a medical provider 24 hours a day, seven days a week via a mobile device or tablet or logging into a secure website from your computer. You can access Gurjit Gonzalez from anywhere in the [place]. A virtual visit might be right for you when you have a simple condition and feel like you just dont want to get out of bed, or cant get away from work for an appointment, when your regular [employer] provider is not available (evenings, weekends or holidays), or when youre out of town and need minor care. Electronic visits cost only $49 and if the [employer] 24/7 provider determines a prescription is needed to treat your condition, one can be electronically transmitted to a nearby pharmacy*. Please take a moment to enroll today if you have not already done so. The enrollment process is free and takes just a few minutes. To enroll, please download the [employer] 24/7 mary anne to your tablet or phone, or visit [email or web address] to enroll on your computer. And, as an [address] [place] [address] patient with a [employer] account, the results of your visits will be scanned into your electronic medical record and your primary care provider will be able to view the scanned results. We urge you to continue to see your regular [employer] provider for your ongoing medical care. And while your primary care provider may not be the one available when you seek a [employer] virtual visit, the peace of mind you get from getting a real diagnosis real time can be priceless. For more information on [employer], view our Frequently Asked Questions (FAQs) at [email or web address]. Sincerely, 
 
Matt Quiroz MD 
Chief Medical Officer [address] [place] [address] Chaim *:  certain medications cannot be prescribed via [employer] Unresulted tests-please follow up with your PCP on these results Procedure/Test Authorizing Provider XR FLUOROSCOPY OVER 61 MINUTES Gopi Mina MD  
 XR SPINE SNGL V (CROSS TABLE LAT) Gopi Mina MD  
  
Providers Seen During Your Hospitalization Provider Specialty Primary office phone Gopi Mina MD Orthopedic Surgery [phone] Your Primary Care Physician (PCP) Primary Care Physician Office Phone Office Fax Sharri Williamsonenta [phone] You are allergic to the following Allergen Reactions Aspirin Other (comments) Blacked out. Talked with patient. Can take other NSAIDS like Ibuprofen, Naprosyn Recent Documentation Height Weight BMI OB Status Smoking Status 1.575 m 67 kg 27.02 kg/m2 Hysterectomy Former Smoker Emergency Contacts Name Discharge Info Relation Home Work Mobile David Jovel DISCHARGE CAREGIVER [3] Spouse [3] [phone] Patient Belongings The following personal items are in your possession at time of discharge: 
  Dental Appliances: Uppers, Lowers (sent to PACU with belongings)  Visual Aid: Glasses      Home Medications: None   Jewelry: None  Clothing: Undergarments, Footwear, Pants, Socks, Shirt    Other Valuables: Everet Earing (sent to PACU with belongings)  Personal Items Sent to Safe: na 
 
  
  
 Please provide this summary of care documentation to your next provider. Signatures-by signing, you are acknowledging that this After Visit Summary has been reviewed with you and you have received a copy. Patient Signature:  ____________________________________________________________ Date:  ____________________________________________________________  
  
[facility] Dave Provider Signature:  ____________________________________________________________ Date:  ____________________________________________________________

## 2018-06-19 VITALS
TEMPERATURE: 98.5 F | SYSTOLIC BLOOD PRESSURE: 126 MMHG | RESPIRATION RATE: 18 BRPM | OXYGEN SATURATION: 99 % | BODY MASS INDEX: 27.18 KG/M2 | WEIGHT: 147.71 LBS | HEIGHT: 62 IN | DIASTOLIC BLOOD PRESSURE: 81 MMHG | HEART RATE: 98 BPM

## 2018-06-19 PROCEDURE — 99218 HC RM OBSERVATION: CPT

## 2018-06-19 PROCEDURE — 74011250636 HC RX REV CODE- 250/636: Performed by: ORTHOPAEDIC SURGERY

## 2018-06-19 PROCEDURE — 97535 SELF CARE MNGMENT TRAINING: CPT | Performed by: OCCUPATIONAL THERAPIST

## 2018-06-19 PROCEDURE — 74011250637 HC RX REV CODE- 250/637: Performed by: ORTHOPAEDIC SURGERY

## 2018-06-19 PROCEDURE — G8978 MOBILITY CURRENT STATUS: HCPCS | Performed by: PHYSICAL THERAPIST

## 2018-06-19 PROCEDURE — G8987 SELF CARE CURRENT STATUS: HCPCS | Performed by: OCCUPATIONAL THERAPIST

## 2018-06-19 PROCEDURE — 74011250636 HC RX REV CODE- 250/636: Performed by: ANESTHESIOLOGY

## 2018-06-19 PROCEDURE — 97165 OT EVAL LOW COMPLEX 30 MIN: CPT | Performed by: OCCUPATIONAL THERAPIST

## 2018-06-19 PROCEDURE — 97161 PT EVAL LOW COMPLEX 20 MIN: CPT | Performed by: PHYSICAL THERAPIST

## 2018-06-19 PROCEDURE — 97116 GAIT TRAINING THERAPY: CPT | Performed by: PHYSICAL THERAPIST

## 2018-06-19 PROCEDURE — G8989 SELF CARE D/C STATUS: HCPCS | Performed by: OCCUPATIONAL THERAPIST

## 2018-06-19 PROCEDURE — G8979 MOBILITY GOAL STATUS: HCPCS | Performed by: PHYSICAL THERAPIST

## 2018-06-19 PROCEDURE — G8988 SELF CARE GOAL STATUS: HCPCS | Performed by: OCCUPATIONAL THERAPIST

## 2018-06-19 RX ORDER — POLYETHYLENE GLYCOL 3350 17 G/17G
17 POWDER, FOR SOLUTION ORAL
Qty: 15 PACKET | Refills: 0 | Status: SHIPPED | OUTPATIENT
Start: 2018-06-19 | End: 2018-07-04

## 2018-06-19 RX ORDER — OXYCODONE HYDROCHLORIDE 5 MG/1
5-10 TABLET ORAL
Qty: 80 TAB | Refills: 0 | Status: SHIPPED | OUTPATIENT
Start: 2018-06-19 | End: 2018-10-08

## 2018-06-19 RX ORDER — ACETAMINOPHEN 500 MG
1000 TABLET ORAL EVERY 6 HOURS
Qty: 112 TAB | Refills: 0 | Status: SHIPPED | OUTPATIENT
Start: 2018-06-19 | End: 2018-07-03

## 2018-06-19 RX ORDER — AMOXICILLIN 250 MG
1 CAPSULE ORAL DAILY
Qty: 30 TAB | Refills: 0 | Status: SHIPPED | OUTPATIENT
Start: 2018-06-19 | End: 2018-10-08

## 2018-06-19 RX ADMIN — UMECLIDINIUM 1 PUFF: 62.5 AEROSOL, POWDER ORAL at 09:30

## 2018-06-19 RX ADMIN — Medication 2 G: at 06:37

## 2018-06-19 RX ADMIN — OXYCODONE HYDROCHLORIDE 5 MG: 5 TABLET ORAL at 10:01

## 2018-06-19 RX ADMIN — SODIUM CHLORIDE 125 ML/HR: 900 INJECTION, SOLUTION INTRAVENOUS at 00:09

## 2018-06-19 RX ADMIN — FAMOTIDINE 20 MG: 20 TABLET, FILM COATED ORAL at 09:25

## 2018-06-19 RX ADMIN — SODIUM CHLORIDE, SODIUM LACTATE, POTASSIUM CHLORIDE, AND CALCIUM CHLORIDE 25 ML/HR: 600; 310; 30; 20 INJECTION, SOLUTION INTRAVENOUS at 01:57

## 2018-06-19 RX ADMIN — KETOROLAC TROMETHAMINE 15 MG: 30 INJECTION, SOLUTION INTRAMUSCULAR at 06:38

## 2018-06-19 RX ADMIN — Medication 10 ML: at 06:39

## 2018-06-19 RX ADMIN — GABAPENTIN 600 MG: 300 CAPSULE ORAL at 09:25

## 2018-06-19 RX ADMIN — ACETAMINOPHEN 1000 MG: 500 TABLET, FILM COATED ORAL at 01:53

## 2018-06-19 RX ADMIN — POLYETHYLENE GLYCOL 3350 17 G: 17 POWDER, FOR SOLUTION ORAL at 09:28

## 2018-06-19 RX ADMIN — ACETAMINOPHEN 1000 MG: 500 TABLET, FILM COATED ORAL at 06:38

## 2018-06-19 RX ADMIN — KETOROLAC TROMETHAMINE 15 MG: 30 INJECTION, SOLUTION INTRAMUSCULAR at 01:53

## 2018-06-19 RX ADMIN — DULOXETINE HYDROCHLORIDE 60 MG: 30 CAPSULE, DELAYED RELEASE ORAL at 09:25

## 2018-06-19 RX ADMIN — PANTOPRAZOLE SODIUM 40 MG: 40 TABLET, DELAYED RELEASE ORAL at 09:25

## 2018-06-19 RX ADMIN — SENNOSIDES AND DOCUSATE SODIUM 1 TABLET: 8.6; 5 TABLET ORAL at 09:25

## 2018-06-19 NOTE — PROGRESS NOTES
Problem: Mobility Impaired (Adult and Pediatric)  Goal: *Acute Goals and Plan of Care (Insert Text)  Physical Therapy Goals  Initiated 6/19/2018    1. Patient will move from supine to sit and sit to supine  in bed with modified independence within 4 days. 2. Patient will perform sit to stand with modified independence within 4 days. 3. Patient will ambulate with modified independence for 250 feet with the least restrictive device within 4 days. 4. Patient will ascend/descend 4 stairs with 1 handrail(s) with modified independence within 4 days. 5. Patient will verbalize and demonstrate understanding of spinal precautions (No bending, lifting greater than 5 lbs, or twisting; log-roll technique; frequent repositioning as instructed) within 4 days. physical Therapy EVALUATION  Patient: Ama Armendariz (70 y.o. female)  Date: 6/19/2018  Primary Diagnosis: LUMBAGO HNP  RADICULOPATHY   HNP (herniated nucleus pulposus), lumbar  Procedure(s) (LRB):  RIGHT L4-L5 DISCECTOMY (Right) 1 Day Post-Op   Precautions:   Back, Fall    ASSESSMENT :  Based on the objective data described below, the patient presents with decreased functional mobility from baseline level of function. Prior to admit patient was independent with mobility. Lives with  in a 2 level home but stays on the 1st floor. Uses rollator walker at baseline and has had falls in the past.  Currently needing supervision for bed mobility and transfers. Amb approx 350 feet with RW and SBA. Able to up/down 4 stairs with B rails and CGA. Patient rather impulsive throughout session and needs constant reminders regarding back precautions. Otherwise patient is cleared to DC from a mobility standpoint. Will continue to follow if DC is delayed. Patient will benefit from skilled intervention to address the above impairments.   Patients rehabilitation potential is considered to be Good  Factors which may influence rehabilitation potential include:   [x] None noted  []         Mental ability/status  []         Medical condition  []         Home/family situation and support systems  []         Safety awareness  []         Pain tolerance/management  []         Other:      PLAN :  Recommendations and Planned Interventions:  [x]           Bed Mobility Training             []    Neuromuscular Re-Education  [x]           Transfer Training                   []    Orthotic/Prosthetic Training  [x]           Gait Training                         []    Modalities  [x]           Therapeutic Exercises           []    Edema Management/Control  [x]           Therapeutic Activities            [x]    Patient and Family Training/Education  []           Other (comment):    Frequency/Duration: Patient will be followed by physical therapy  twice daily to address goals. Discharge Recommendations: None  Further Equipment Recommendations for Discharge: none-owns equipment     SUBJECTIVE:   Patient stated I'm hoping I can go home today if you think that's okay.     OBJECTIVE DATA SUMMARY:   HISTORY:    Past Medical History:   Diagnosis Date    Arthritis     RHEUMATOID AND OSTEOARTHRITIS    C. difficile diarrhea 2004    as of 10/23/14 pt denies any diarrhea or sx    Cancer ([facility] [address] 1982    uterine CA, hysterectomy, no chemo / radiation    Cancer ([facility] [address] 2008    Rt BR; lumpectomy, radiation with chemo pill x6 yrs    Fibromyalgia     GERD (gastroesophageal reflux disease)     Hypercholesteremia     Other ill-defined conditions(799.89)     GRANULOMA ANEULARE, SKIN DISORDER    Psychiatric disorder     ANXIETY    Seasonal allergies      Past Surgical History:   Procedure Laterality Date    ABDOMEN SURGERY [address] (1/2 REMOVED; \"WAS IN KNOTS\")    BREAST SURGERY PROCEDURE UNLISTED Right 2/2008    RIGHT BREAST LUMPECTOMY    COLONOSCOPY N/A 5/25/2018    [facility]COPY    HX CARPAL TUNNEL RELEASE Bilateral     HX CERVICAL DISKECTOMY  2/3/14    C4-5     HX COLONOSCOPY      HX GI      ENDOSCOPY (DILATATION/ESOPH)    HX HYSTERECTOMY  1982    HX KNEE ARTHROSCOPY Left     x2    HX MOHS PROCEDURES Right     HX ORTHOPAEDIC  2005, 2011    CERVICAL NECK    HX OTHER SURGICAL  3/2008    hematoma s/p Rt BR lumpectomy    HX TONSILLECTOMY  1963    T&A     Prior Level of Function/Home Situation: Independent with functional mobility but uses rollator walker for home use  Personal factors and/or comorbidities impacting plan of care:     Home Situation  Home Environment: Private residence  # Steps to Enter: 3  Rails to Enter: Yes  Hand Rails : Bilateral  One/Two Story Residence: Two story, live on 1st floor  Living Alone: No  Support Systems: Spouse/Significant Other/Partner  Patient Expects to be Discharged to[de-identified] Private residence  Current DME Used/Available at Home: Caridad Gu, rollator, Grab bars, Shower chair, Commode, bedside  Tub or Shower Type: Shower    EXAMINATION/PRESENTATION/DECISION MAKING:   Critical Behavior:  Neurologic State: Alert  Orientation Level: Oriented X4  Cognition: Appropriate safety awareness     Hearing: Auditory  Auditory Impairment: None    Range Of Motion:  AROM: Generally decreased, functional                       Strength:    Strength: Generally decreased, functional     Functional Mobility:  Bed Mobility:  Rolling: Supervision  Supine to Sit: Supervision  Sit to Supine: Supervision     Transfers:  Sit to Stand: Stand-by assistance  Stand to Sit: Stand-by assistance                       Balance:   Sitting: Intact  Standing: Intact; With support  Ambulation/Gait Training:  Distance (ft): 350 Feet (ft)  Assistive Device: Gait belt;Walker, rollator  Ambulation - Level of Assistance: Stand-by assistance     Gait Description (WDL): Exceptions to WDL  Gait Abnormalities: Antalgic;Decreased step clearance        Base of Support: Widened     Speed/Lesvia: Pace decreased (<100 feet/min); Slow  Step Length: Left shortened;Right shortened        Stairs:  Number of Stairs Trained: 4  Stairs - Level of Assistance: Stand-by assistance   Rails:  BOTH  Functional Measure:  Barthel Index:    Bathin  Bladder: 10  Bowels: 10  Groomin  Dressin  Feeding: 10  Mobility: 10  Stairs: 5  Toilet Use: 10  Transfer (Bed to Chair and Back): 15  Total: 80       Barthel and G-code impairment scale:  Percentage of impairment CH  0% CI  1-19% CJ  20-39% CK  40-59% CL  60-79% CM  80-99% CN  100%   Barthel Score 0-100 100 99-80 79-60 59-40 20-39 1-19   0   Barthel Score 0-20 20 17-19 13-16 9-12 5-8 1-4 0      The Barthel ADL Index: Guidelines  1. The index should be used as a record of what a patient does, not as a record of what a patient could do. 2. The main aim is to establish degree of independence from any help, physical or verbal, however minor and for whatever reason. 3. The need for supervision renders the patient not independent. 4. A patient's performance should be established using the best available evidence. Asking the patient, friends/relatives and nurses are the usual sources, but direct observation and common sense are also important. However direct testing is not needed. 5. Usually the patient's performance over the preceding 24-48 hours is important, but occasionally longer periods will be relevant. 6. Middle categories imply that the patient supplies over 50 per cent of the effort. 7. Use of aids to be independent is allowed. Mary Camara., Barthel, D.W. ([id number]). Functional evaluation: the Barthel Index. [address] [facility] (14)2. RICHARD Day, Shine Ceron., Elder Cueva., [place], [address] [place] [address] (). Measuring the change indisability after inpatient rehabilitation; comparison of the responsiveness of the Barthel Index and Functional [place] Measure. Journal of Neurology, Neurosurgery, and Psychiatry, 66(4), 795-302.   Dustin Walters, N.J.A, LEONARDO Lloyd, Yuri Galloway M.A. (2004.) Assessment of post-stroke quality of life in cost-effectiveness studies: The usefulness of the Barthel Index and the EuroQoL-5D. Quality of Life Research, 13, 459-25         G codes: In compliance with CMSs Claims Based Outcome Reporting, the following G-code set was chosen for this patient based on their primary functional limitation being treated: The outcome measure chosen to determine the severity of the functional limitation was the Barthel with a score of 80/100 which was correlated with the impairment scale. ? Mobility - Walking and Moving Around:     - CURRENT STATUS: CI - 1%-19% impaired, limited or restricted    - GOAL STATUS: CH - 0% impaired, limited or restricted    - D/C STATUS:  ---------------To be determined---------------       Pain:  Pain Scale 1: Numeric (0 - 10)  Pain Intensity 1: 4  Pain Location 1: Back  Pain Orientation 1: Lower  Pain Description 1: Aching  Pain Intervention(s) 1: Medication (see MAR)  Activity Tolerance:   VSS  Please refer to the flowsheet for vital signs taken during this treatment. After treatment:   [x]         Patient left in no apparent distress sitting up in chair  []         Patient left in no apparent distress in bed  [x]         Call bell left within reach  [x]         Nursing notified  []         Caregiver present  []         Bed alarm activated    COMMUNICATION/EDUCATION:   The patients plan of care was discussed with: Physical Therapist, Occupational Therapist and Registered Nurse. [x]         Fall prevention education was provided and the patient/caregiver indicated understanding. [x]         Patient/family have participated as able in goal setting and plan of care. [x]         Patient/family agree to work toward stated goals and plan of care. []         Patient understands intent and goals of therapy, but is neutral about his/her participation. []         Patient is unable to participate in goal setting and plan of care.     Thank you for this referral.  Titus Tidwell, PT, DPT   Time Calculation: 20 mins

## 2018-06-19 NOTE — ROUTINE PROCESS
Report given to [facility], RN . Report consisted of patients Situation, Background, Assessment and   Recommendations(SBAR).

## 2018-06-19 NOTE — PROGRESS NOTES
ORTHO POST OP SPINE PROGRESS NOTE    2018  Admit Date: 2018  Admit Diagnosis: LUMBAGO HNP  RADICULOPATHY   HNP (herniated nucleus pulposus), lumbar  Procedure: Procedure(s):  RIGHT L4-L5 DISCECTOMY  Post Op day: 1 Day Post-Op    Subjective:     Good Lynn is a patient who has complaints of min LBP, no RLE pain s/p L4-5 right lumbar discectomy. tolerating po and able to void. .     Review of Systems: Pertinent items are noted in HPI. Objective:     PT/OT:   Distance Ambulated:           Time Ambulated (min):        Ambulation Response:        Assistive Device:              Assistive Device: Fall prevention device, Walker (comment)    Vital Signs:    Blood pressure 122/83, pulse 97, temperature 97.2 °F (36.2 °C), resp. rate 20, height 5' 2\" (1.575 m), weight 67 kg (147 lb 11.3 oz), SpO2 98 %. Temp (24hrs), Av.9 °F (36.6 °C), Min:97.2 °F (36.2 °C), Max:98.9 °F (37.2 °C)          1901 -  0700  In: 3078.3 [I.V.:3078.3]  Out: 125 [Urine:100]    LAB:    No results for input(s): HGB, HGBEXT, WBC, PLT, PLTEXT, HGBEXT, PLTEXT in the last 72 hours.     Wound/Drain Assessment:  Drain:      Dressing:     Physical Exam:  Neurological: no deficit  Incision clean, dry, and intact  5/5 BLE    Assessment:      Patient Active Problem List   Diagnosis Code    Gait abnormality R26.9    Neuropathy G62.9    Risk for falls Z91.81    Fibromyalgia M79.7    Chronic back pain M54.9, G89.29    HNP (herniated nucleus pulposus), lumbar M51.26    S/P discectomy F32.319       Plan:     Continue PT/OT/Rehab  Discontinue: IV  Consult: PT  and OT    Discharge To: home today       Signed By: LAVELL Pyror

## 2018-06-19 NOTE — PROGRESS NOTES
Occupational Therapy EVALUATION/discharge  Patient: Cam Miller (59 y.o. female)  Date: 6/19/2018  Primary Diagnosis: LUMBAGO HNP  RADICULOPATHY   HNP (herniated nucleus pulposus), lumbar  Procedure(s) (LRB):  RIGHT L4-L5 DISCECTOMY (Right) 1 Day Post-Op   Precautions:   Back, Fall    ASSESSMENT:   Based on the objective data described below, the patient presents with overall supervision level for mobility and ADLs. Pt was able to state all LS precautions but needed occasional verbal and tactile cues to maintain precautions. Pt reports that she falls at home due to her neuropathy and she was educated on having cleared paths and having her  remove scatter rugs. Pt reports that she likes the rugs and she did not want to remove them. Issued handout on LS precautions and educated pt on adaptive strategies and home safety. She verbalized understanding. Upon arrival to room pt was standing donning brace. Pt was able to don all clothing with supervision. Issued hand on LS precautions. Further skilled acute occupational therapy is not indicated at this time. Discharge Recommendations: home with family support  Further Equipment Recommendations for Discharge: none      SUBJECTIVE:   Patient stated I know all of the precautions. I have done this before.     OBJECTIVE DATA SUMMARY:   HISTORY:   Past Medical History:   Diagnosis Date    Arthritis     RHEUMATOID AND OSTEOARTHRITIS    C. difficile diarrhea 2004    as of 10/23/14 pt denies any diarrhea or sx    Cancer ([facility] [address] 1982    uterine CA, hysterectomy, no chemo / radiation    Cancer ([facility] [address] 2008    Rt BR; lumpectomy, radiation with chemo pill x6 yrs    Fibromyalgia     GERD (gastroesophageal reflux disease)     Hypercholesteremia     Other ill-defined conditions(799.89)     GRANULOMA ANEULARE, SKIN DISORDER    Psychiatric disorder     ANXIETY    Seasonal allergies      Past Surgical History:   Procedure Laterality Date    ABDOMEN SURGERY [address] (1/2 REMOVED; \"WAS IN KNOTS\")    BREAST SURGERY PROCEDURE UNLISTED Right 2/2008    RIGHT BREAST LUMPECTOMY    COLONOSCOPY N/A 5/25/2018    COLON[facility] performed by [facility] at [facility] ENDOSCOPY    HX CARPAL TUNNEL RELEASE Bilateral     HX CERVICAL DISKECTOMY  2/3/14    C4-5     HX COLONOSCOPY      HX GI      ENDOSCOPY (DILATATION/ESOPH)    HX HYSTERECTOMY  1982    HX KNEE ARTHROSCOPY Left     x2    HX MOHS PROCEDURES Right     HX ORTHOPAEDIC  2005, 2011    CERVICAL NECK    HX OTHER SURGICAL  3/2008    hematoma s/p Rt BR lumpectomy    HX TONSILLECTOMY  1963    T&A       Prior Level of Function/Environment/Context: ambulated with rollator walker or furniture walks; performed ADLS and IADLs on her own; frequently drops objects and trips due to neuropathy    Expanded or extensive additional review of patient history:     Home Situation  Home Environment: Private residence  # Steps to Enter: 3  Rails to Enter: Yes  Hand Rails : Bilateral  One/Two Story Residence: Two story, live on 1st floor  Living Alone: No  Support Systems: Spouse/Significant Other/Partner  Patient Expects to be Discharged to[de-identified] Private residence  Current DME Used/Available at Home: Elveria Holli, rollator, Grab bars, Shower chair, Commode, bedside  Tub or Shower Type: Shower    Hand dominance: Right    EXAMINATION OF PERFORMANCE DEFICITS:  Cognitive/Behavioral Status:  Neurologic State: Alert  Orientation Level: Oriented X4  Cognition: Appropriate decision making; Appropriate for age attention/concentration; Follows commands  Perception: Appears intact  Perseveration: No perseveration noted  Safety/Judgement: Awareness of environment; Fall prevention;Decreased awareness of need for safety      Hearing:   Auditory  Auditory Impairment: None    Vision/Perceptual:                           Acuity: Within Defined Limits         Range of Motion:    AROM: Generally decreased, functional Strength:    Strength: Generally decreased, functional                Coordination:    WFL  Gross Motor Skills-Upper: Left Intact; Right Intact    Tone & Sensation:                              Balance:  Sitting: Intact  Standing: Intact; With support    Functional Mobility and Transfers for ADLs:  Bed Mobility:  Rolling: Supervision  Supine to Sit: Supervision  Sit to Supine: Supervision    Transfers:  Sit to Stand: Stand-by assistance  Stand to Sit: Stand-by assistance  Bed to Chair: Supervision  Toilet Transfer : Supervision    ADL Assessment:  Feeding: Supervision    Oral Facial Hygiene/Grooming: Supervision    Bathing: Supervision    Upper Body Dressing: Supervision    Lower Body Dressing: Supervision    Toileting: Supervision                ADL Intervention and task modifications:    Bathing: Patient instructed and indicated understanding when bathing to not submerge wound in water, stand to shower or sponge batheDressing brace: Patient instructed and demonstrated to don/doff velcro on quick draw brace. Educated pt on how to stand against wall to adjust brace in standing. Dressing lower body: Patient instructed to don brace first and on the benefits to remain seated to don all clothing to increase independence with precautions and pain management. Educated pt on performing crossed leg technique seated to don LB clothing and pt was able to perform S. Toileting: Patient instructed on the benefits of using flushable wet wipes and toilet tongs if decreased reach or pain for sailaja care. Also, the benefits of a reacher to aid in clothing management. Home safety: Patient instructed and indicated understanding on home modifications and safety (raise height of ADL objects, appropriate height of chair surfaces, recliner safety, change of floor surfaces, clear pathways; have family remove scatter rugs) to increase independence and fall prevention with good understanding.     Standing: Patient instructed and indicated understanding to walk up to sink/counter top/surfaces, step into walker, square off while using objects, slide objects along surfaces, to increase adherence to back precautions and fall prevention. Patient instructed to increase amount of time standing in order to increase independence and tolerance with ADLs. Patient instructed and indicated understanding the benefits of maintaining activity tolerance, functional mobility, and independence with self care tasks during acute stay  to ensure safe return home and to baseline. Encouraged patient to increase frequency and duration OOB, not sitting longer than 30 mins without marching/walking with staff, be out of bed for all meals, perform daily ADLs (as approved by RN/MD regarding bathing etc), and performing functional mobility to/from bathroom. Patient instruction and indicated understanding on body mechanics, ergonomics and gravitational force on the spine during different body positions to plan activities in prep for return home to complete instrumental ADLs and back to work safely. Cognitive Retraining  Safety/Judgement: Awareness of environment; Fall prevention;Decreased awareness of need for safety       Functional Measure:  Barthel Index:    Bathin  Bladder: 10  Bowels: 10  Groomin  Dressin  Feeding: 10  Mobility: 10  Stairs: 5  Toilet Use: 10  Transfer (Bed to Chair and Back): 15  Total: 80       Barthel and G-code impairment scale:  Percentage of impairment CH  0% CI  1-19% CJ  20-39% CK  40-59% CL  60-79% CM  80-99% CN  100%   Barthel Score 0-100 100 99-80 79-60 59-40 20-39 1-19   0   Barthel Score 0-20 20 17-19 13-16 9-12 5-8 1-4 0      The Barthel ADL Index: Guidelines  1. The index should be used as a record of what a patient does, not as a record of what a patient could do. 2. The main aim is to establish degree of independence from any help, physical or verbal, however minor and for whatever reason.   3. The need for supervision renders the patient not independent. 4. A patient's performance should be established using the best available evidence. Asking the patient, friends/relatives and nurses are the usual sources, but direct observation and common sense are also important. However direct testing is not needed. 5. Usually the patient's performance over the preceding 24-48 hours is important, but occasionally longer periods will be relevant. 6. Middle categories imply that the patient supplies over 50 per cent of the effort. 7. Use of aids to be independent is allowed. [facility]., Barthel, DJacobW. ([id number]). Functional evaluation: the Barthel Index. [address] [facility] (14)2. Winston Krause josie RICHARD Krause, Raz Celis., Lenord Shone., Marty Vazquez, [address] [facility] (1999). Measuring the change indisability after inpatient rehabilitation; comparison of the responsiveness of the Barthel Index and Functional [place] Measure. Journal of Neurology, Neurosurgery, and Psychiatry, 66(4), 268-047. Felisha Slade, NJacobJABDELRAHMAN, LEONARDO Lloyd, & Renetta Ocasio MABDELRAHMAN. (2004.) Assessment of post-stroke quality of life in cost-effectiveness studies: The usefulness of the Barthel Index and the EuroQoL-5D. Quality of Life Research, 13, 054-49         G codes: In compliance with CMSs Claims Based Outcome Reporting, the following G-code set was chosen for this patient based on their primary functional limitation being treated: The outcome measure chosen to determine the severity of the functional limitation was the barthel with a score of 80/100 which was correlated with the impairment scale. ?  Self Care:     - CURRENT STATUS: CI - 1%-19% impaired, limited or restricted    - GOAL STATUS: CI - 1%-19% impaired, limited or restricted    - D/C STATUS:  CI - 1%-19% impaired, limited or restricted     Occupational Therapy Evaluation Charge Determination   History Examination Decision-Making   LOW Complexity : Brief history review  LOW Complexity : 1-3 performance deficits relating to physical, cognitive , or psychosocial skils that result in activity limitations and / or participation restrictions  MEDIUM Complexity : Patient may present with comorbidities that affect occupational performnce. Miniml to moderate modification of tasks or assistance (eg, physical or verbal ) with assesment(s) is necessary to enable patient to complete evaluation       Based on the above components, the patient evaluation is determined to be of the following complexity level: LOW   Pain:  Pain Scale 1: Numeric (0 - 10)  Pain Intensity 1: 4  Pain Location 1: Back  Pain Orientation 1: Lower  Pain Description 1: Aching  Pain Intervention(s) 1: Medication (see MAR)  Activity Tolerance:     Please refer to the flowsheet for vital signs taken during this treatment. After treatment:   [x]  Patient left in no apparent distress sitting up in chair  []  Patient left in no apparent distress in bed  [x]  Call bell left within reach  [x]  Nursing notified  []  Caregiver present  []  Bed alarm activated    COMMUNICATION/EDUCATION:   Communication/Collaboration:  [x]      Home safety education was provided and the patient/caregiver indicated understanding. [x]      Patient have participated as able and agree with findings and recommendations. []      Patient is unable to participate in plan of care at this time.   Findings and recommendations were discussed with: Physical Therapist, Registered Nurse and patient    HEMANT Jj/L  Time Calculation: 23 mins

## 2018-06-19 NOTE — DISCHARGE SUMMARY
Spine Discharge Summary    Patient ID:  Shakir Ponce  [id number]  female  63 y.o.  1955    Admit date: 6/18/2018    Discharge date: 6/19/2018    Admitting Physician: Gopi Mina MD     Consulting Physician(s):   Treatment Team: Attending Provider: Gopi Mina MD; Utilization Review: Anand Nelson RN; Nurse Practitioner: Tresa Richard NP    Date of Surgery:   6/18/2018     Preoperative Diagnosis:  LUMBAGO  HNP   RADICULOPATHY     Postoperative Diagnosis:   LUMBAGO  HNP   RADICULOPATHY     Procedure(s):  RIGHT L4-L5 DISCECTOMY     Anesthesia Type:   General     Surgeon: Gopi Mina MD                            HPI:  Pt is a 63 y.o. female who has a history of LUMBAGO  HNP   RADICULOPATHY   with pain and limitations of activities of daily living who presents at this time for a Right L4-5 following the failure of conservative management. PMH:   Past Medical History:   Diagnosis Date    Arthritis     RHEUMATOID AND OSTEOARTHRITIS    C. difficile diarrhea 2004    as of 10/23/14 pt denies any diarrhea or sx    Cancer ([facility] [address] 1982    uterine CA, hysterectomy, no chemo / radiation    Cancer ([facility] [address] 2008    Rt BR; lumpectomy, radiation with chemo pill x6 yrs    Fibromyalgia     GERD (gastroesophageal reflux disease)     Hypercholesteremia     Other ill-defined conditions(799.89)     GRANULOMA ANEULARE, SKIN DISORDER    Psychiatric disorder     ANXIETY    Seasonal allergies        Body mass index is 27.02 kg/(m^2). : A BMI > 30 is classified as obesity and > 40 is classified as morbid obesity. Medications upon admission :   Prior to Admission Medications   Prescriptions Last Dose Informant Patient Reported? Taking? DULoxetine (CYMBALTA) 60 mg capsule 6/18/2018 at 0800  No Yes   Sig: TAKE 2 CAPSULES DAILY   GABAPENTIN PO 6/18/2018 at 0800  Yes Yes   Sig: Take 1,200 mg by mouth three (3) times daily. SIMVASTATIN (ZOCOR PO) 6/16/2018  Yes No   Sig: Take 20 mg by mouth nightly. cyclobenzaprine (FLEXERIL) 10 mg tablet 6/18/2018 at 0800  No Yes   Sig: Take 1 Tab by mouth three (3) times daily as needed for Muscle Spasm(s). diclofenac (VOLTAREN) 1 % gel 6/15/2018  Yes No   Sig: Apply  to affected area as needed. diphenhydrAMINE (BENADRYL) 50 mg tablet 6/17/2018 at Unknown time  Yes Yes   Sig: Take 100 mg by mouth two (2) times a day. Indications: ALLERGIC REACTIONS   diphenoxylate-atropine (LOMOTIL) 2.5-0.025 mg per tablet Not Taking at Unknown time  Yes No   Sig: Take 1 Tab by mouth four (4) times daily as needed. esomeprazole (NEXIUM) 20 mg capsule 6/18/2018 at 0800  Yes Yes   Sig: Take 20 mg by mouth daily. Indications: Heartburn   raNITIdine (ZANTAC) 150 mg tablet 6/18/2018 at 0800  Yes Yes   Sig: Take 150 mg by mouth two (2) times a day. Indications: Heartburn   umeclidinium (INCRUSE ELLIPTA) 62.5 mcg/actuation inhaler Not Taking at Unknown time  Yes No   Sig: Take 1 Puff by inhalation daily. Facility-Administered Medications: None        Allergies: Allergies   Allergen Reactions    Aspirin Other (comments)     Blacked out. Talked with patient. Can take other NSAIDS like Ibuprofen, Naprosyn        Hospital Course: The patient underwent surgery. Complications:  None; patient tolerated the procedure well. Was taken to the PACU in stable condition and then transferred to the ortho floor. Perioperative Antibiotics:  Ancef     Postoperative Pain Management:  Oxycodone      Postoperative transfusions:    Number of units banked PRBCs =   none     Post Op complications: none    Hemoglobin at discharge:    Lab Results   Component Value Date/Time    HGB 12.9 06/14/2018 12:06 PM    INR 1.0 06/14/2018 12:06 PM       Dressing was changed on POD # 1. Incision - clean, dry and intact. No significant erythema or swelling. Neurovascular exam found to be within normal limits. Wound appears to be healing without any evidence of infection.  Pt had a HVAC drain that was removed on POD# 1. Physical Therapy started on the day following surgery and participated in bed mobility, transfers and ambulation. Gait:  Gait  Base of Support: Widened  Speed/Lesvia: Pace decreased (<100 feet/min), Slow  Step Length: Left shortened, Right shortened  Gait Abnormalities: Antalgic, Decreased step clearance  Ambulation - Level of Assistance: Stand-by assistance  Distance (ft): 350 Feet (ft)  Assistive Device: Gait belt, Walker, rollator  Rail Use: Both  Stairs - Level of Assistance: Stand-by assistance  Number of Stairs Trained: 4                   Discharged to: Home. Condition on Discharge:   stable    Discharge instructions:    - Take pain medications as prescribed  - Resume pre hospital diet      - Discharge activity: activity as tolerated  - Ambulate as tolerated  - Wound Care Keep wound clean and dry. See discharge instruction sheet. -DISCHARGE MEDICATION LIST     Current Discharge Medication List      START taking these medications    Details   acetaminophen (TYLENOL) 500 mg tablet Take 2 Tabs by mouth every six (6) hours for 14 days. Qty: 112 Tab, Refills: 0      oxyCODONE IR (ROXICODONE) 5 mg immediate release tablet Take 1-2 Tabs by mouth every four (4) hours as needed. Max Daily Amount: 60 mg.  Qty: 80 Tab, Refills: 0    Associated Diagnoses: S/P discectomy      polyethylene glycol (MIRALAX) 17 gram packet Take 1 Packet by mouth daily as needed (constipation) for up to 15 days. Qty: 15 Packet, Refills: 0      senna-docusate (PERICOLACE) 8.6-50 mg per tablet Take 1 Tab by mouth daily. Qty: 30 Tab, Refills: 0         CONTINUE these medications which have NOT CHANGED    Details   raNITIdine (ZANTAC) 150 mg tablet Take 150 mg by mouth two (2) times a day. Indications: Heartburn      esomeprazole (NEXIUM) 20 mg capsule Take 20 mg by mouth daily. Indications: Heartburn      GABAPENTIN PO Take 1,200 mg by mouth three (3) times daily.       DULoxetine (CYMBALTA) 60 mg capsule TAKE 2 CAPSULES DAILY  Qty: 180 Cap, Refills: 2    Associated Diagnoses: Neuropathy; Gait abnormality      cyclobenzaprine (FLEXERIL) 10 mg tablet Take 1 Tab by mouth three (3) times daily as needed for Muscle Spasm(s). Qty: 270 Tab, Refills: 0    Associated Diagnoses: Neuropathy; Gait abnormality      diphenhydrAMINE (BENADRYL) 50 mg tablet Take 100 mg by mouth two (2) times a day. Indications: ALLERGIC REACTIONS      diclofenac (VOLTAREN) 1 % gel Apply  to affected area as needed. umeclidinium (INCRUSE ELLIPTA) 62.5 mcg/actuation inhaler Take 1 Puff by inhalation daily. SIMVASTATIN (ZOCOR PO) Take 20 mg by mouth nightly. diphenoxylate-atropine (LOMOTIL) 2.5-0.025 mg per tablet Take 1 Tab by mouth four (4) times daily as needed. per medical continuation form      -Follow up in office in 2 weeks      Signed:  Nataliia De La Rosa.  Margarette Opitz, ACNP-BC, ONP-C  Orthopaedic Nurse Practitioner    6/19/2018  11:18 AM

## 2018-06-19 NOTE — DISCHARGE INSTRUCTIONS
[address]    Discharge Instruction Sheet :   Microdiskectomy    DR. Mendy Siddiqui    Pain control:  Typically, we will prescribe a narcotic usually 1-2 tabs every four hours is sufficient for the pain. Most patients need this only for the first few weeks. You should discontinue this as the pain decreases. You should not drive while taking any narcotic pain medications. Constipation  Pain medicines and anesthesia can be constipating-this can be prevented by gentle physical activity and drinking plenty of fluid. It should be treated with over-the-counter medications such as Miralax or suppositories, and/or Fleets enema. You should have a bowel movement at least every other day following surgery. Incision care    Keep this area clean and dry. Leave the current dressing in place for 7 days. You may shower with this dressing, but DO NOT take a tub bath or go swimming until cleared by your doctor. DO NOT apply lotions, oils, or creams to incision. After 7 days, remove this dressing and apply a new opsite (impermiable)  Dressing over the incision. This dressing may stay on for 7 more days (until your follow up visit with your surgeon). If staples are in place, they should be removed about 14-20 days after surgery. To increase and promote healing:   Stop Smoking (or at least cut back on smoking).  Eat a well-balanced diet (high in protein and vitamin C)   If your appetite is poor, consider nutritional supplements like Ensure, Glucerna, or Bend Instant Breakfast.   If you are diabetic, controlling you blood sugars is very important to prevent infection and promote wound healing. Nutrition:   If you were on a supplement such as Ensure or Glucerna) while in the hospital, please continue using them with each meal for the next 30 days.    Eat a well-balanced diet - High in protein, high in vitamins and minerals, especially vitamin C and zinc.     Restrictions:  Limited bending at waist  Lift no more than 10 pounds    Warning signs : Please call your physician immediately at [phone] if you have   Bleeding from incision that is constant.  Change in mental status (unusual behavior or confusion)   If your incision develops redness or swelling   Change in wound drainage (increase in amount, color, or foul odor)   [place] over 101.5 degrees Fahrenheit    Headache that is not relieved with pain medication   Tenderness or redness in the calf of your leg    Emergency: CALL 911 if you have   Shortness of breath   Chest pain   Localized chest pain when coughing or taking a deep breath    Follow-up  Please call Dr. Nila Billingsley office for a follow up appointment in 2 weeks at [phone]. You can return to work when cleared by a physician. During normal business hours you may reach Dr. Belén Saunders' team directly at [phone] if you have concerns or questions.       Juan Rashid

## 2018-06-19 NOTE — PROGRESS NOTES
Ortho / Neurosurgery NP Note    POD# 1  s/p RIGHT L4-L5 DISCECTOMY   Pt seen with Dr. Lou Castro    Pt resting in chair  No complaints. VSS Afebrile. Voiding status: + void    Labs  Lab Results   Component Value Date/Time    HGB 12.9 06/14/2018 12:06 PM      Lab Results   Component Value Date/Time    INR 1.0 06/14/2018 12:06 PM        Body mass index is 27.02 kg/(m^2). : A BMI > 30 is classified as obesity and > 40 is classified as morbid obesity. Dressing c.d.i  Cryotherapy in place over incision  Calves soft and supple;  No pain with passive stretch  Sensation and motor intact  SCDs for mechanical DVT proph while in bed     PLAN:  1) PT BID  2) Readniess for discharge:     [x] Vital Signs stable    [x] Hgb stable    [x] + Voiding    [x] Wound intact, drainage minimal    [x] Tolerating PO intake     [] Cleared by PT (OT if applicable)     [] Stair training completed (if applicable)    [] Independent / [facility] (household distance)     [] Bed mobility     [] Car transfers     [] ADLs    [x] Adequate pain control on oral medication alone     D/c home today after clears PT    Elsa Sen, NP  DNP, ACNP-BC, ONP-C

## 2018-06-19 NOTE — PROGRESS NOTES
Spiritual Care Assessment/Progress Note  [facility]      NAME: Jeet Soto      MRN: [id number]  AGE: 63 y.o.  SEX: female  Mormon Affiliation: Tenriism   Language: English     6/19/2018     Total Time (in minutes): 52     Spiritual Assessment begun in [facility] through conversation with:         [x]Patient        [] Family    [] Friend(s)        Reason for Consult: Request by patient     Spiritual beliefs: (Please include comment if needed)     [x] Identifies with a martinez tradition:    Tenriism     [] Supported by a martinez community:            [] Claims no spiritual orientation:           [] Seeking spiritual identity:                [] Adheres to an individual form of spirituality:           [] Not able to assess:                           Identified resources for coping:      [x] Prayer                               [] Music                  [] Guided Imagery     [x] Family/friends                 [] Pet visits     [] Devotional reading                         [] Unknown     [] Other:                                            Interventions offered during this visit: (See comments for more details)    Patient Interventions: Affirmation of martinez, Affirmation of emotions/emotional suffering, Catharsis/review of pertinent events in supportive environment, Iconic (affirming the presence of God/Higher Power), Initial/Spiritual assessment, patient floor, Normalization of emotional/spiritual concerns, Prayer (actual), Mormon beliefs/image of God discussed           Plan of Care:     [x] Support spiritual and/or cultural needs    [] Support AMD and/or advance care planning process      [] Support grieving process   [] Coordinate Rites and/or Rituals    [] Coordination with community clergy   [] No spiritual needs identified at this time   [] Detailed Plan of Care below (See Comments)  [] Make referral to Music Therapy  [] Make referral to Pet Therapy     [] Make referral to Addiction services  [] Make referral to [facility]  [] Make referral to Spiritual Care Partner  [] No future visits requested        [x] Follow up visits as needed     Comments:   Responded to patient request for pastoral support on Ortho unit. No visitors present. Patient identifies as Latter-day, though she has not been in Gnosticist in quite some time. Prayer appears to provide some comfort at this time. Jose Hart engaged in life review. She has one son and two grandchildren. Her grandchildren seem very special to her. She is . She mentioned having a [place] of friends, but her emotional support system seems limited. Offered emotional support and prayer.     LYNDON Van, [facility], [address]    [address] Paging Service  [phone])

## 2018-06-19 NOTE — PROGRESS NOTES
Bedside and Verbal shift change report given to Sonal Philippe RN (oncoming nurse) by Merlinda Posner, RN (offgoing nurse). Report included the following information SBAR, Kardex, Intake/Output and MAR.

## 2018-10-01 ENCOUNTER — HOSPITAL ENCOUNTER (OUTPATIENT)
Dept: MRI IMAGING | Age: 63
Discharge: HOME | End: 2018-10-01
Attending: ORTHOPAEDIC SURGERY
Payer: MEDICARE

## 2018-10-01 DIAGNOSIS — M51.26 DISPLACEMENT OF LUMBAR INTERVERTEBRAL DISC WITHOUT MYELOPATHY: ICD-10-CM

## 2018-10-01 PROCEDURE — 74011250636 HC RX REV CODE- 250/636: Performed by: ORTHOPAEDIC SURGERY

## 2018-10-01 PROCEDURE — A9575 INJ GADOTERATE MEGLUMI 0.1ML: HCPCS | Performed by: ORTHOPAEDIC SURGERY

## 2018-10-01 PROCEDURE — 72158 MRI LUMBAR SPINE W/O & W/DYE: CPT

## 2018-10-01 RX ORDER — GADOTERATE MEGLUMINE 376.9 MG/ML
15 INJECTION INTRAVENOUS
Status: COMPLETED | OUTPATIENT
Start: 2018-10-01 | End: 2018-10-01

## 2018-10-01 RX ADMIN — GADOTERATE MEGLUMINE 13 ML: 376.9 INJECTION INTRAVENOUS at 14:46

## 2018-10-05 NOTE — PERIOP NOTES
LM for Marc Watkins NP, nurses line to tell them fax number and what we need for medical clearance noted. Fax and phone number provided.

## 2018-10-08 ENCOUNTER — HOSPITAL ENCOUNTER (OUTPATIENT)
Dept: PREADMISSION TESTING | Age: 63
Discharge: HOME | DRG: 455 | End: 2018-10-08
Attending: ORTHOPAEDIC SURGERY
Payer: MEDICARE

## 2018-10-08 VITALS
RESPIRATION RATE: 22 BRPM | SYSTOLIC BLOOD PRESSURE: 147 MMHG | BODY MASS INDEX: 26.94 KG/M2 | WEIGHT: 146.39 LBS | TEMPERATURE: 97.8 F | HEIGHT: 62 IN | OXYGEN SATURATION: 100 % | HEART RATE: 92 BPM | DIASTOLIC BLOOD PRESSURE: 94 MMHG

## 2018-10-08 LAB
25(OH)D3 SERPL-MCNC: 21.5 NG/ML (ref 30–100)
ABO + RH BLD: NORMAL
ALBUMIN SERPL-MCNC: 3.5 G/DL (ref 3.5–5)
ALBUMIN/GLOB SERPL: 1.2 {RATIO} (ref 1.1–2.2)
ALP SERPL-CCNC: 69 U/L (ref 45–117)
ALT SERPL-CCNC: 22 U/L (ref 12–78)
ANION GAP SERPL CALC-SCNC: 7 MMOL/L (ref 5–15)
APPEARANCE UR: CLEAR
AST SERPL-CCNC: 14 U/L (ref 15–37)
BACTERIA URNS QL MICRO: NEGATIVE /HPF
BILIRUB SERPL-MCNC: 0.3 MG/DL (ref 0.2–1)
BILIRUB UR QL: NEGATIVE
BLOOD GROUP ANTIBODIES SERPL: NORMAL
BUN SERPL-MCNC: 16 MG/DL (ref 6–20)
BUN/CREAT SERPL: 20 (ref 12–20)
CALCIUM SERPL-MCNC: 8.9 MG/DL (ref 8.5–10.1)
CHLORIDE SERPL-SCNC: 108 MMOL/L (ref 97–108)
CO2 SERPL-SCNC: 25 MMOL/L (ref 21–32)
COLOR UR: NORMAL
CREAT SERPL-MCNC: 0.8 MG/DL (ref 0.55–1.02)
EPITH CASTS URNS QL MICRO: NORMAL /LPF
ERYTHROCYTE [DISTWIDTH] IN BLOOD BY AUTOMATED COUNT: 12.5 % (ref 11.5–14.5)
EST. AVERAGE GLUCOSE BLD GHB EST-MCNC: 120 MG/DL
GLOBULIN SER CALC-MCNC: 3 G/DL (ref 2–4)
GLUCOSE SERPL-MCNC: 104 MG/DL (ref 65–100)
GLUCOSE UR STRIP.AUTO-MCNC: NEGATIVE MG/DL
HBA1C MFR BLD: 5.8 % (ref 4.2–6.3)
HCT VFR BLD AUTO: 40.5 % (ref 35–47)
HGB BLD-MCNC: 13.5 G/DL (ref 11.5–16)
HGB UR QL STRIP: NEGATIVE
HYALINE CASTS URNS QL MICRO: NORMAL /LPF (ref 0–5)
INR PPP: 1 (ref 0.9–1.1)
KETONES UR QL STRIP.AUTO: NEGATIVE MG/DL
LEUKOCYTE ESTERASE UR QL STRIP.AUTO: NEGATIVE
MCH RBC QN AUTO: 31.4 PG (ref 26–34)
MCHC RBC AUTO-ENTMCNC: 33.3 G/DL (ref 30–36.5)
MCV RBC AUTO: 94.2 FL (ref 80–99)
NITRITE UR QL STRIP.AUTO: NEGATIVE
NRBC # BLD: 0 K/UL (ref 0–0.01)
NRBC BLD-RTO: 0 PER 100 WBC
PH UR STRIP: 6.5 [PH] (ref 5–8)
PLATELET # BLD AUTO: 193 K/UL (ref 150–400)
PMV BLD AUTO: 10.9 FL (ref 8.9–12.9)
POTASSIUM SERPL-SCNC: 4 MMOL/L (ref 3.5–5.1)
PREALB SERPL-MCNC: 28.7 MG/DL (ref 20–40)
PROT SERPL-MCNC: 6.5 G/DL (ref 6.4–8.2)
PROT UR STRIP-MCNC: NEGATIVE MG/DL
PROTHROMBIN TIME: 10 SEC (ref 9–11.1)
RBC # BLD AUTO: 4.3 M/UL (ref 3.8–5.2)
RBC #/AREA URNS HPF: NORMAL /HPF (ref 0–5)
SODIUM SERPL-SCNC: 140 MMOL/L (ref 136–145)
SP GR UR REFRACTOMETRY: 1.02 (ref 1–1.03)
SPECIMEN EXP DATE BLD: NORMAL
UA: UC IF INDICATED,UAUC: NORMAL
UROBILINOGEN UR QL STRIP.AUTO: 0.2 EU/DL (ref 0.2–1)
WBC # BLD AUTO: 6.7 K/UL (ref 3.6–11)
WBC URNS QL MICRO: NORMAL /HPF (ref 0–4)

## 2018-10-08 PROCEDURE — 85027 COMPLETE CBC AUTOMATED: CPT | Performed by: ORTHOPAEDIC SURGERY

## 2018-10-08 PROCEDURE — 80053 COMPREHEN METABOLIC PANEL: CPT | Performed by: ORTHOPAEDIC SURGERY

## 2018-10-08 PROCEDURE — 86900 BLOOD TYPING SEROLOGIC ABO: CPT | Performed by: ORTHOPAEDIC SURGERY

## 2018-10-08 PROCEDURE — 84134 ASSAY OF PREALBUMIN: CPT | Performed by: ORTHOPAEDIC SURGERY

## 2018-10-08 PROCEDURE — 82306 VITAMIN D 25 HYDROXY: CPT | Performed by: ORTHOPAEDIC SURGERY

## 2018-10-08 PROCEDURE — 36415 COLL VENOUS BLD VENIPUNCTURE: CPT | Performed by: ORTHOPAEDIC SURGERY

## 2018-10-08 PROCEDURE — 83036 HEMOGLOBIN GLYCOSYLATED A1C: CPT | Performed by: ORTHOPAEDIC SURGERY

## 2018-10-08 PROCEDURE — 85610 PROTHROMBIN TIME: CPT | Performed by: ORTHOPAEDIC SURGERY

## 2018-10-08 PROCEDURE — 81001 URINALYSIS AUTO W/SCOPE: CPT | Performed by: ORTHOPAEDIC SURGERY

## 2018-10-08 RX ORDER — CEFAZOLIN SODIUM/WATER 2 G/20 ML
2 SYRINGE (ML) INTRAVENOUS ONCE
Status: CANCELLED | OUTPATIENT
Start: 2018-10-10 | End: 2018-10-10

## 2018-10-08 RX ORDER — SODIUM CHLORIDE, SODIUM LACTATE, POTASSIUM CHLORIDE, CALCIUM CHLORIDE 600; 310; 30; 20 MG/100ML; MG/100ML; MG/100ML; MG/100ML
25 INJECTION, SOLUTION INTRAVENOUS CONTINUOUS
Status: CANCELLED | OUTPATIENT
Start: 2018-10-10

## 2018-10-08 RX ORDER — PREGABALIN 150 MG/1
150 CAPSULE ORAL ONCE
Status: CANCELLED | OUTPATIENT
Start: 2018-10-10 | End: 2018-10-10

## 2018-10-08 RX ORDER — DOXYCYCLINE 100 MG/1
100 TABLET ORAL 2 TIMES DAILY
COMMUNITY
End: 2018-10-11

## 2018-10-08 RX ORDER — TIZANIDINE HYDROCHLORIDE 4 MG/1
6 CAPSULE, GELATIN COATED ORAL
COMMUNITY
End: 2021-02-23

## 2018-10-08 RX ORDER — ACETAMINOPHEN 500 MG
1000 TABLET ORAL ONCE
Status: CANCELLED | OUTPATIENT
Start: 2018-10-10 | End: 2018-10-10

## 2018-10-08 NOTE — PERIOP NOTES
Incentive Riki Kumari Using the incentive spirometer helps expand the small air sacs of your lungs, helps you breathe deeply, and helps improve your lung function. Use your incentive spirometer twice a day (10 breaths each time) prior to surgery. How to Use Your Incentive Spirometer: 1. Hold the incentive spirometer in an upright position. 2. Breathe out as usual.  
3. Place the mouthpiece in your mouth and seal your lips tightly around it. 4. Take a deep breath. Breathe in slowly and as deeply as possible. Keep the blue flow rate guide between the arrows. 5. Hold your breath as long as possible. Then exhale slowly and allow the piston to fall to the bottom of the column. 6. Rest for a few seconds and repeat steps one through five at least 10 times. PAT Tidal Volume___2250___  x__1___  Date_10/8/2018__ BRING THE INCENTIVE SPIROMETER WITH YOU TO THE HOSPITAL ON THE DAY OF YOUR SURGERY. Opportunity given to ask and answer questions as well as to observe return demonstration. Patient signature_____________________________    Witness____________________________

## 2018-10-08 NOTE — PERIOP NOTES
[facility] Preoperative Instructions Surgery Date 10/10/2018          Time of Arrival 3:30 p.m. 
 
1. On the day of your surgery, please report to the Surgical Services Registration Desk and sign in at your designated time. The Surgery Center is located to the right of the Emergency Room. 2. You must have someone with you to drive you home. You should not drive a car for 24 hours following surgery. Please make arrangements for a friend or family member to stay with you for the first 24 hours after your surgery. 3. Do not have anything to eat or drink (including water, gum, mints, coffee, juice) after midnight. ?This may not apply to medications prescribed by your physician. ?(Please note below the special instructions with medications to take the morning of your procedure.) 4. We recommend you do not drink any alcoholic beverages for 24 hours before and after your surgery. 5. Contact your surgeons office for instructions on the following medications: non-steroidal anti-inflammatory drugs (i.e. Advil, Aleve), vitamins, and supplements. (Some surgeons will want you to stop these medications prior to surgery and others may allow you to take them) **If you are currently taking Plavix, Coumadin, Aspirin and/or other blood-thinning agents, contact your surgeon for instructions. ** Your surgeon will partner with the physician prescribing these medications to determine if it is safe to stop or if you need to continue taking. Please do not stop taking these medications without instructions from your surgeon 6. Wear comfortable clothes. Wear glasses instead of contacts. Do not bring any money or jewelry. Please bring picture ID, insurance card, and any prearranged co-payment or hospital payment. Do not wear make-up, particularly mascara the morning of your surgery. Do not wear nail polish, particularly if you are having foot /hand surgery.   Wear your hair loose or down, no ponytails, buns, christi pins or clips. All body piercings must be removed. Please shower with antibacterial soap for three consecutive days before and on the morning of surgery, but do not apply any lotions, powders or deodorants after the shower on the day of surgery. Please use a fresh towels after each shower. Please sleep in clean clothes and change bed linens the night before surgery. Please do not shave for 48 hours prior to surgery. Shaving of the face is acceptable. 7. You should understand that if you do not follow these instructions your surgery may be cancelled. If your physical condition changes (I.e. fever, cold or flu) please contact your surgeon as soon as possible. 8. It is important that you be on time. If a situation occurs where you may be late, please call [phone] (OR Holding Area). 9. If you have any questions and or problems, please call [phone] (Pre-admission Testing). 10. Your surgery time may be subject to change. You will receive a phone call the evening prior if your time changes. 11.  If having outpatient surgery, you must have someone to drive you here, stay with you during the duration of your stay, and to drive you home at time of discharge. 12.   In an effort to improve the efficiency, privacy, and safety for all of our Pre-op patients visitors are not allowed in the Holding area. Once you arrive and are registered your family/visitors will be asked to remain in the waiting room. The Pre-op staff will get you from the Surgical Waiting Area and will explain to you and your family/visitors that the Pre-op phase is beginning. The staff will answer any questions and provide instructions for tracking of the patient, by use of the existing tracking number and color-coded status board in the waiting room.   At this time the staff will also ask for your designated spokesperson information in the event that the physician or staff need to provide an update or obtain any pertinent information. The designated spokesperson will be notified if the physician needs to speak to family during the pre-operative phase. If at any time your family/visitors has questions or concerns they may approach the volunteer desk in the waiting area for assistance. Special Instructions: MEDICATIONS TO TAKE THE MORNING OF SURGERY WITH A SIP OF WATER: flexeril if needed, benadryl, doxycycline, cymbalta, esomeprazole, ranitidine, gabapentin I understand a pre-operative phone call will be made to verify my surgery time. In the event that I am not available, I give permission for a message to be left on my answering service and/or with another person? Yes [phone] 
 
 
 
 ___________________      __________   _________ 
  (Signature of Patient)             (Witness)                (Date and Time)

## 2018-10-09 LAB
BACTERIA SPEC CULT: NORMAL
BACTERIA SPEC CULT: NORMAL
SERVICE CMNT-IMP: NORMAL

## 2018-10-10 ENCOUNTER — HOSPITAL ENCOUNTER (INPATIENT)
Age: 63
LOS: 1 days | Discharge: HOME HEALTH | DRG: 455 | End: 2018-10-11
Attending: ORTHOPAEDIC SURGERY | Admitting: ORTHOPAEDIC SURGERY
Payer: MEDICARE

## 2018-10-10 ENCOUNTER — ANESTHESIA EVENT (OUTPATIENT)
Dept: SURGERY | Age: 63
DRG: 455 | End: 2018-10-10
Payer: MEDICARE

## 2018-10-10 ENCOUNTER — ANESTHESIA (OUTPATIENT)
Dept: SURGERY | Age: 63
DRG: 455 | End: 2018-10-10
Payer: MEDICARE

## 2018-10-10 ENCOUNTER — APPOINTMENT (OUTPATIENT)
Dept: GENERAL RADIOLOGY | Age: 63
DRG: 455 | End: 2018-10-10
Attending: ORTHOPAEDIC SURGERY
Payer: MEDICARE

## 2018-10-10 DIAGNOSIS — Z98.1 S/P SPINAL FUSION: Primary | ICD-10-CM

## 2018-10-10 PROBLEM — M48.061 SPINAL STENOSIS OF LUMBAR REGION AT MULTIPLE LEVELS: Status: ACTIVE | Noted: 2018-10-10

## 2018-10-10 PROCEDURE — 74011250636 HC RX REV CODE- 250/636: Performed by: ORTHOPAEDIC SURGERY

## 2018-10-10 PROCEDURE — 77030014647 HC SEAL FBRN TISSL BAXT -D: Performed by: ORTHOPAEDIC SURGERY

## 2018-10-10 PROCEDURE — 74011250636 HC RX REV CODE- 250/636

## 2018-10-10 PROCEDURE — C1713 ANCHOR/SCREW BN/BN,TIS/BN: HCPCS | Performed by: ORTHOPAEDIC SURGERY

## 2018-10-10 PROCEDURE — 77030018836 HC SOL IRR NACL ICUM -A: Performed by: ORTHOPAEDIC SURGERY

## 2018-10-10 PROCEDURE — 77030012961 HC IRR KT CYSTO/TUR ICUM -A: Performed by: ORTHOPAEDIC SURGERY

## 2018-10-10 PROCEDURE — 76060000035 HC ANESTHESIA 2 TO 2.5 HR: Performed by: ORTHOPAEDIC SURGERY

## 2018-10-10 PROCEDURE — 77030022704 HC SUT VLOC COVD -B: Performed by: ORTHOPAEDIC SURGERY

## 2018-10-10 PROCEDURE — 72100 X-RAY EXAM L-S SPINE 2/3 VWS: CPT

## 2018-10-10 PROCEDURE — 76010000171 HC OR TIME 2 TO 2.5 HR INTENSV-TIER 1: Performed by: ORTHOPAEDIC SURGERY

## 2018-10-10 PROCEDURE — 74011000250 HC RX REV CODE- 250

## 2018-10-10 PROCEDURE — 77030026438 HC STYL ET INTUB CARD -A: Performed by: NURSE ANESTHETIST, CERTIFIED REGISTERED

## 2018-10-10 PROCEDURE — 74011250636 HC RX REV CODE- 250/636: Performed by: ANESTHESIOLOGY

## 2018-10-10 PROCEDURE — 07DR3ZZ EXTRACTION OF ILIAC BONE MARROW, PERCUTANEOUS APPROACH: ICD-10-PCS | Performed by: ORTHOPAEDIC SURGERY

## 2018-10-10 PROCEDURE — 77030032490 HC SLV COMPR SCD KNE COVD -B: Performed by: ORTHOPAEDIC SURGERY

## 2018-10-10 PROCEDURE — 77030035129: Performed by: ORTHOPAEDIC SURGERY

## 2018-10-10 PROCEDURE — 77030036946 HC GRFT BN FBR CORT 3DEMIN 10CC BACT -G: Performed by: ORTHOPAEDIC SURGERY

## 2018-10-10 PROCEDURE — 77030003666 HC NDL SPINAL BD -A: Performed by: ORTHOPAEDIC SURGERY

## 2018-10-10 PROCEDURE — 76210000006 HC OR PH I REC 0.5 TO 1 HR: Performed by: ORTHOPAEDIC SURGERY

## 2018-10-10 PROCEDURE — 77030008467 HC STPLR SKN COVD -B: Performed by: ORTHOPAEDIC SURGERY

## 2018-10-10 PROCEDURE — 77030003028 HC SUT VCRL J&J -A: Performed by: ORTHOPAEDIC SURGERY

## 2018-10-10 PROCEDURE — 0SG0071 FUSION OF LUMBAR VERTEBRAL JOINT WITH AUTOLOGOUS TISSUE SUBSTITUTE, POSTERIOR APPROACH, POSTERIOR COLUMN, OPEN APPROACH: ICD-10-PCS | Performed by: ORTHOPAEDIC SURGERY

## 2018-10-10 PROCEDURE — 77030038844 HC GRFT DMB NEVOS BIOE -G1: Performed by: ORTHOPAEDIC SURGERY

## 2018-10-10 PROCEDURE — 77030004391 HC BUR FLUT MEDT -C: Performed by: ORTHOPAEDIC SURGERY

## 2018-10-10 PROCEDURE — 0SG00AJ FUSION OF LUMBAR VERTEBRAL JOINT WITH INTERBODY FUSION DEVICE, POSTERIOR APPROACH, ANTERIOR COLUMN, OPEN APPROACH: ICD-10-PCS | Performed by: ORTHOPAEDIC SURGERY

## 2018-10-10 PROCEDURE — 77030037914 HC SPCR SPN ALTERA GLBM -K1: Performed by: ORTHOPAEDIC SURGERY

## 2018-10-10 PROCEDURE — 77030003029 HC SUT VCRL J&J -B: Performed by: ORTHOPAEDIC SURGERY

## 2018-10-10 PROCEDURE — 77030018846 HC SOL IRR STRL H20 ICUM -A: Performed by: ORTHOPAEDIC SURGERY

## 2018-10-10 PROCEDURE — 77030029099 HC BN WAX SSPC -A: Performed by: ORTHOPAEDIC SURGERY

## 2018-10-10 PROCEDURE — 77030018723 HC ELCTRD BLD COVD -A: Performed by: ORTHOPAEDIC SURGERY

## 2018-10-10 PROCEDURE — 77030014007 HC SPNG HEMSTAT J&J -B: Performed by: ORTHOPAEDIC SURGERY

## 2018-10-10 PROCEDURE — 77030013079 HC BLNKT BAIR HGGR 3M -A: Performed by: NURSE ANESTHETIST, CERTIFIED REGISTERED

## 2018-10-10 PROCEDURE — 0ST20ZZ RESECTION OF LUMBAR VERTEBRAL DISC, OPEN APPROACH: ICD-10-PCS | Performed by: ORTHOPAEDIC SURGERY

## 2018-10-10 PROCEDURE — 77030020782 HC GWN BAIR PAWS FLX 3M -B

## 2018-10-10 PROCEDURE — 74011000272 HC RX REV CODE- 272: Performed by: ORTHOPAEDIC SURGERY

## 2018-10-10 PROCEDURE — 76001 XR FLUOROSCOPY OVER 60 MINUTES: CPT

## 2018-10-10 PROCEDURE — 65270000029 HC RM PRIVATE

## 2018-10-10 PROCEDURE — 74011000250 HC RX REV CODE- 250: Performed by: ORTHOPAEDIC SURGERY

## 2018-10-10 PROCEDURE — 74011250637 HC RX REV CODE- 250/637: Performed by: ORTHOPAEDIC SURGERY

## 2018-10-10 PROCEDURE — 77030008684 HC TU ET CUF COVD -B: Performed by: NURSE ANESTHETIST, CERTIFIED REGISTERED

## 2018-10-10 PROCEDURE — 77030029914 HC PMP F NGP WND DRSG PICO S&N -C: Performed by: ORTHOPAEDIC SURGERY

## 2018-10-10 PROCEDURE — 77030033138 HC SUT PGA STRATFX J&J -B: Performed by: ORTHOPAEDIC SURGERY

## 2018-10-10 DEVICE — IMPLANTABLE DEVICE: Type: IMPLANTABLE DEVICE | Site: SPINE LUMBAR | Status: FUNCTIONAL

## 2018-10-10 DEVICE — ROD SPNL L40MM DIA55MM RAD 100MM POST PEDCL PURE TI HRD: Type: IMPLANTABLE DEVICE | Site: SPINE LUMBAR | Status: FUNCTIONAL

## 2018-10-10 DEVICE — HEAD SPNL SCR TI ALLY POLYAX FULL THRD REDUC FOR 5.5MM MTRX: Type: IMPLANTABLE DEVICE | Site: SPINE LUMBAR | Status: FUNCTIONAL

## 2018-10-10 DEVICE — CAP SPNL CO CHROM ALLY FLAT 1 STP LOK FOR 5.5MM ROD MTRX: Type: IMPLANTABLE DEVICE | Site: SPINE LUMBAR | Status: FUNCTIONAL

## 2018-10-10 DEVICE — ALLOGRAFT BNE SPNG 50X20X7 MM CANC DBM: Type: IMPLANTABLE DEVICE | Site: SPINE LUMBAR | Status: FUNCTIONAL

## 2018-10-10 DEVICE — SCREW SPNL L45MM DIA7MM PEDCL TI ALLY POLYAX CANN THRD MTRX: Type: IMPLANTABLE DEVICE | Site: SPINE LUMBAR | Status: FUNCTIONAL

## 2018-10-10 DEVICE — ALTERA SPACER, 10 X 36, 9-13MM, 15&DEG;
Type: IMPLANTABLE DEVICE | Site: SPINE LUMBAR | Status: FUNCTIONAL
Brand: ALTERA

## 2018-10-10 RX ORDER — ATORVASTATIN CALCIUM 10 MG/1
10 TABLET, FILM COATED ORAL
Status: DISCONTINUED | OUTPATIENT
Start: 2018-10-10 | End: 2018-10-11 | Stop reason: HOSPADM

## 2018-10-10 RX ORDER — ONDANSETRON 2 MG/ML
4 INJECTION INTRAMUSCULAR; INTRAVENOUS
Status: DISCONTINUED | OUTPATIENT
Start: 2018-10-10 | End: 2018-10-11 | Stop reason: HOSPADM

## 2018-10-10 RX ORDER — SUCCINYLCHOLINE CHLORIDE 20 MG/ML
INJECTION INTRAMUSCULAR; INTRAVENOUS AS NEEDED
Status: DISCONTINUED | OUTPATIENT
Start: 2018-10-10 | End: 2018-10-10 | Stop reason: HOSPADM

## 2018-10-10 RX ORDER — DIPHENOXYLATE HYDROCHLORIDE AND ATROPINE SULFATE 2.5; .025 MG/1; MG/1
1 TABLET ORAL
Status: DISCONTINUED | OUTPATIENT
Start: 2018-10-10 | End: 2018-10-11 | Stop reason: HOSPADM

## 2018-10-10 RX ORDER — PANTOPRAZOLE SODIUM 40 MG/1
40 TABLET, DELAYED RELEASE ORAL
Status: DISCONTINUED | OUTPATIENT
Start: 2018-10-11 | End: 2018-10-11 | Stop reason: HOSPADM

## 2018-10-10 RX ORDER — ACETAMINOPHEN 10 MG/ML
1000 INJECTION, SOLUTION INTRAVENOUS ONCE
Status: COMPLETED | OUTPATIENT
Start: 2018-10-10 | End: 2018-10-10

## 2018-10-10 RX ORDER — DIPHENHYDRAMINE HYDROCHLORIDE 50 MG/ML
12.5 INJECTION, SOLUTION INTRAMUSCULAR; INTRAVENOUS AS NEEDED
Status: DISCONTINUED | OUTPATIENT
Start: 2018-10-10 | End: 2018-10-10 | Stop reason: HOSPADM

## 2018-10-10 RX ORDER — ACETAMINOPHEN 500 MG
1000 TABLET ORAL ONCE
Status: DISCONTINUED | OUTPATIENT
Start: 2018-10-10 | End: 2018-10-10

## 2018-10-10 RX ORDER — DEXAMETHASONE SODIUM PHOSPHATE 4 MG/ML
INJECTION, SOLUTION INTRA-ARTICULAR; INTRALESIONAL; INTRAMUSCULAR; INTRAVENOUS; SOFT TISSUE AS NEEDED
Status: DISCONTINUED | OUTPATIENT
Start: 2018-10-10 | End: 2018-10-10 | Stop reason: HOSPADM

## 2018-10-10 RX ORDER — OXYCODONE HYDROCHLORIDE 5 MG/1
10-15 TABLET ORAL
Status: DISCONTINUED | OUTPATIENT
Start: 2018-10-10 | End: 2018-10-11 | Stop reason: HOSPADM

## 2018-10-10 RX ORDER — SODIUM CHLORIDE 0.9 % (FLUSH) 0.9 %
5-10 SYRINGE (ML) INJECTION AS NEEDED
Status: DISCONTINUED | OUTPATIENT
Start: 2018-10-10 | End: 2018-10-11 | Stop reason: HOSPADM

## 2018-10-10 RX ORDER — ACETAMINOPHEN 500 MG
1000 TABLET ORAL EVERY 6 HOURS
Status: DISCONTINUED | OUTPATIENT
Start: 2018-10-11 | End: 2018-10-11 | Stop reason: HOSPADM

## 2018-10-10 RX ORDER — POLYETHYLENE GLYCOL 3350 17 G/17G
17 POWDER, FOR SOLUTION ORAL DAILY
Status: DISCONTINUED | OUTPATIENT
Start: 2018-10-11 | End: 2018-10-11 | Stop reason: HOSPADM

## 2018-10-10 RX ORDER — DEXAMETHASONE SODIUM PHOSPHATE 4 MG/ML
10 INJECTION, SOLUTION INTRA-ARTICULAR; INTRALESIONAL; INTRAMUSCULAR; INTRAVENOUS; SOFT TISSUE ONCE
Status: DISCONTINUED | OUTPATIENT
Start: 2018-10-11 | End: 2018-10-11 | Stop reason: HOSPADM

## 2018-10-10 RX ORDER — PHENYLEPHRINE HCL IN 0.9% NACL 0.4MG/10ML
SYRINGE (ML) INTRAVENOUS AS NEEDED
Status: DISCONTINUED | OUTPATIENT
Start: 2018-10-10 | End: 2018-10-10 | Stop reason: HOSPADM

## 2018-10-10 RX ORDER — SODIUM CHLORIDE, SODIUM LACTATE, POTASSIUM CHLORIDE, CALCIUM CHLORIDE 600; 310; 30; 20 MG/100ML; MG/100ML; MG/100ML; MG/100ML
25 INJECTION, SOLUTION INTRAVENOUS CONTINUOUS
Status: DISCONTINUED | OUTPATIENT
Start: 2018-10-10 | End: 2018-10-10 | Stop reason: HOSPADM

## 2018-10-10 RX ORDER — PROPOFOL 10 MG/ML
INJECTION, EMULSION INTRAVENOUS AS NEEDED
Status: DISCONTINUED | OUTPATIENT
Start: 2018-10-10 | End: 2018-10-10 | Stop reason: HOSPADM

## 2018-10-10 RX ORDER — SODIUM CHLORIDE 0.9 % (FLUSH) 0.9 %
5-10 SYRINGE (ML) INJECTION AS NEEDED
Status: DISCONTINUED | OUTPATIENT
Start: 2018-10-10 | End: 2018-10-10 | Stop reason: HOSPADM

## 2018-10-10 RX ORDER — AMOXICILLIN 250 MG
1 CAPSULE ORAL 2 TIMES DAILY
Status: DISCONTINUED | OUTPATIENT
Start: 2018-10-10 | End: 2018-10-11 | Stop reason: HOSPADM

## 2018-10-10 RX ORDER — DIPHENHYDRAMINE HCL 25 MG
100 CAPSULE ORAL EVERY 12 HOURS
Status: DISCONTINUED | OUTPATIENT
Start: 2018-10-10 | End: 2018-10-11 | Stop reason: HOSPADM

## 2018-10-10 RX ORDER — FAMOTIDINE 20 MG/1
20 TABLET, FILM COATED ORAL EVERY 12 HOURS
Status: DISCONTINUED | OUTPATIENT
Start: 2018-10-10 | End: 2018-10-11 | Stop reason: HOSPADM

## 2018-10-10 RX ORDER — ROCURONIUM BROMIDE 10 MG/ML
INJECTION, SOLUTION INTRAVENOUS AS NEEDED
Status: DISCONTINUED | OUTPATIENT
Start: 2018-10-10 | End: 2018-10-10 | Stop reason: HOSPADM

## 2018-10-10 RX ORDER — GABAPENTIN 300 MG/1
1200 CAPSULE ORAL 3 TIMES DAILY
Status: DISCONTINUED | OUTPATIENT
Start: 2018-10-11 | End: 2018-10-11 | Stop reason: HOSPADM

## 2018-10-10 RX ORDER — HYDROMORPHONE HYDROCHLORIDE 1 MG/ML
INJECTION, SOLUTION INTRAMUSCULAR; INTRAVENOUS; SUBCUTANEOUS
Status: DISPENSED
Start: 2018-10-10 | End: 2018-10-11

## 2018-10-10 RX ORDER — ONDANSETRON 2 MG/ML
INJECTION INTRAMUSCULAR; INTRAVENOUS AS NEEDED
Status: DISCONTINUED | OUTPATIENT
Start: 2018-10-10 | End: 2018-10-10 | Stop reason: HOSPADM

## 2018-10-10 RX ORDER — CEFAZOLIN SODIUM/WATER 2 G/20 ML
2 SYRINGE (ML) INTRAVENOUS EVERY 8 HOURS
Status: COMPLETED | OUTPATIENT
Start: 2018-10-11 | End: 2018-10-11

## 2018-10-10 RX ORDER — SODIUM CHLORIDE 0.9 % (FLUSH) 0.9 %
5-10 SYRINGE (ML) INJECTION EVERY 8 HOURS
Status: DISCONTINUED | OUTPATIENT
Start: 2018-10-10 | End: 2018-10-10 | Stop reason: HOSPADM

## 2018-10-10 RX ORDER — NALOXONE HYDROCHLORIDE 0.4 MG/ML
0.4 INJECTION, SOLUTION INTRAMUSCULAR; INTRAVENOUS; SUBCUTANEOUS AS NEEDED
Status: DISCONTINUED | OUTPATIENT
Start: 2018-10-10 | End: 2018-10-11 | Stop reason: HOSPADM

## 2018-10-10 RX ORDER — CYCLOBENZAPRINE HCL 10 MG
10 TABLET ORAL
Status: DISCONTINUED | OUTPATIENT
Start: 2018-10-10 | End: 2018-10-11 | Stop reason: HOSPADM

## 2018-10-10 RX ORDER — HYDROMORPHONE HYDROCHLORIDE 1 MG/ML
0.2 INJECTION, SOLUTION INTRAMUSCULAR; INTRAVENOUS; SUBCUTANEOUS
Status: DISCONTINUED | OUTPATIENT
Start: 2018-10-10 | End: 2018-10-10 | Stop reason: HOSPADM

## 2018-10-10 RX ORDER — DIAZEPAM 5 MG/1
5 TABLET ORAL
Status: DISCONTINUED | OUTPATIENT
Start: 2018-10-10 | End: 2018-10-11 | Stop reason: HOSPADM

## 2018-10-10 RX ORDER — LIDOCAINE HYDROCHLORIDE 10 MG/ML
0.1 INJECTION, SOLUTION EPIDURAL; INFILTRATION; INTRACAUDAL; PERINEURAL AS NEEDED
Status: DISCONTINUED | OUTPATIENT
Start: 2018-10-10 | End: 2018-10-10 | Stop reason: HOSPADM

## 2018-10-10 RX ORDER — FAMOTIDINE 20 MG/1
20 TABLET, FILM COATED ORAL 2 TIMES DAILY
Status: DISCONTINUED | OUTPATIENT
Start: 2018-10-11 | End: 2018-10-10

## 2018-10-10 RX ORDER — DULOXETIN HYDROCHLORIDE 30 MG/1
60 CAPSULE, DELAYED RELEASE ORAL EVERY 12 HOURS
Status: DISCONTINUED | OUTPATIENT
Start: 2018-10-10 | End: 2018-10-11 | Stop reason: HOSPADM

## 2018-10-10 RX ORDER — FACIAL-BODY WIPES
10 EACH TOPICAL DAILY PRN
Status: DISCONTINUED | OUTPATIENT
Start: 2018-10-12 | End: 2018-10-11 | Stop reason: HOSPADM

## 2018-10-10 RX ORDER — SODIUM CHLORIDE 9 MG/ML
125 INJECTION, SOLUTION INTRAVENOUS CONTINUOUS
Status: DISCONTINUED | OUTPATIENT
Start: 2018-10-10 | End: 2018-10-11 | Stop reason: HOSPADM

## 2018-10-10 RX ORDER — FENTANYL CITRATE 50 UG/ML
25 INJECTION, SOLUTION INTRAMUSCULAR; INTRAVENOUS
Status: DISCONTINUED | OUTPATIENT
Start: 2018-10-10 | End: 2018-10-10 | Stop reason: HOSPADM

## 2018-10-10 RX ORDER — DICLOFENAC SODIUM 10 MG/G
4 GEL TOPICAL
Status: DISCONTINUED | OUTPATIENT
Start: 2018-10-10 | End: 2018-10-11 | Stop reason: HOSPADM

## 2018-10-10 RX ORDER — CEFAZOLIN SODIUM/WATER 2 G/20 ML
2 SYRINGE (ML) INTRAVENOUS ONCE
Status: COMPLETED | OUTPATIENT
Start: 2018-10-10 | End: 2018-10-10

## 2018-10-10 RX ORDER — ACETAMINOPHEN 500 MG
1000 TABLET ORAL
COMMUNITY

## 2018-10-10 RX ORDER — HYDROXYZINE HYDROCHLORIDE 10 MG/1
10 TABLET, FILM COATED ORAL
Status: DISCONTINUED | OUTPATIENT
Start: 2018-10-10 | End: 2018-10-11 | Stop reason: HOSPADM

## 2018-10-10 RX ORDER — HYDROMORPHONE HYDROCHLORIDE 2 MG/ML
1 INJECTION, SOLUTION INTRAMUSCULAR; INTRAVENOUS; SUBCUTANEOUS
Status: DISCONTINUED | OUTPATIENT
Start: 2018-10-10 | End: 2018-10-11 | Stop reason: HOSPADM

## 2018-10-10 RX ORDER — FENTANYL CITRATE 50 UG/ML
INJECTION, SOLUTION INTRAMUSCULAR; INTRAVENOUS AS NEEDED
Status: DISCONTINUED | OUTPATIENT
Start: 2018-10-10 | End: 2018-10-10 | Stop reason: HOSPADM

## 2018-10-10 RX ORDER — SODIUM CHLORIDE 0.9 % (FLUSH) 0.9 %
5-10 SYRINGE (ML) INJECTION EVERY 8 HOURS
Status: DISCONTINUED | OUTPATIENT
Start: 2018-10-11 | End: 2018-10-11 | Stop reason: HOSPADM

## 2018-10-10 RX ORDER — OXYCODONE HYDROCHLORIDE 5 MG/1
5 TABLET ORAL
Status: DISCONTINUED | OUTPATIENT
Start: 2018-10-10 | End: 2018-10-11 | Stop reason: HOSPADM

## 2018-10-10 RX ORDER — MIDAZOLAM HYDROCHLORIDE 1 MG/ML
INJECTION, SOLUTION INTRAMUSCULAR; INTRAVENOUS AS NEEDED
Status: DISCONTINUED | OUTPATIENT
Start: 2018-10-10 | End: 2018-10-10 | Stop reason: HOSPADM

## 2018-10-10 RX ORDER — PREGABALIN 75 MG/1
150 CAPSULE ORAL ONCE
Status: COMPLETED | OUTPATIENT
Start: 2018-10-10 | End: 2018-10-10

## 2018-10-10 RX ADMIN — SODIUM CHLORIDE 125 ML/HR: 900 INJECTION, SOLUTION INTRAVENOUS at 20:39

## 2018-10-10 RX ADMIN — PROPOFOL 150 MG: 10 INJECTION, EMULSION INTRAVENOUS at 18:28

## 2018-10-10 RX ADMIN — ACETAMINOPHEN 1000 MG: 10 INJECTION, SOLUTION INTRAVENOUS at 16:23

## 2018-10-10 RX ADMIN — FENTANYL CITRATE 50 MCG: 50 INJECTION, SOLUTION INTRAMUSCULAR; INTRAVENOUS at 20:10

## 2018-10-10 RX ADMIN — PREGABALIN 150 MG: 75 CAPSULE ORAL at 16:05

## 2018-10-10 RX ADMIN — ROCURONIUM BROMIDE 10 MG: 10 INJECTION, SOLUTION INTRAVENOUS at 18:28

## 2018-10-10 RX ADMIN — OXYCODONE HYDROCHLORIDE 15 MG: 5 TABLET ORAL at 22:31

## 2018-10-10 RX ADMIN — Medication 120 MCG: at 19:13

## 2018-10-10 RX ADMIN — FENTANYL CITRATE 25 MCG: 50 INJECTION, SOLUTION INTRAMUSCULAR; INTRAVENOUS at 20:45

## 2018-10-10 RX ADMIN — DEXAMETHASONE SODIUM PHOSPHATE 4 MG: 4 INJECTION, SOLUTION INTRA-ARTICULAR; INTRALESIONAL; INTRAMUSCULAR; INTRAVENOUS; SOFT TISSUE at 18:43

## 2018-10-10 RX ADMIN — FENTANYL CITRATE 50 MCG: 50 INJECTION, SOLUTION INTRAMUSCULAR; INTRAVENOUS at 20:06

## 2018-10-10 RX ADMIN — FENTANYL CITRATE 50 MCG: 50 INJECTION, SOLUTION INTRAMUSCULAR; INTRAVENOUS at 18:28

## 2018-10-10 RX ADMIN — MIDAZOLAM HYDROCHLORIDE 2 MG: 1 INJECTION, SOLUTION INTRAMUSCULAR; INTRAVENOUS at 18:24

## 2018-10-10 RX ADMIN — FENTANYL CITRATE 25 MCG: 50 INJECTION, SOLUTION INTRAMUSCULAR; INTRAVENOUS at 20:48

## 2018-10-10 RX ADMIN — SUCCINYLCHOLINE CHLORIDE 140 MG: 20 INJECTION INTRAMUSCULAR; INTRAVENOUS at 18:28

## 2018-10-10 RX ADMIN — DIAZEPAM 5 MG: 5 TABLET ORAL at 20:45

## 2018-10-10 RX ADMIN — HYDROMORPHONE HYDROCHLORIDE 0.3 MG: 1 INJECTION, SOLUTION INTRAMUSCULAR; INTRAVENOUS; SUBCUTANEOUS at 20:00

## 2018-10-10 RX ADMIN — FENTANYL CITRATE 50 MCG: 50 INJECTION, SOLUTION INTRAMUSCULAR; INTRAVENOUS at 20:00

## 2018-10-10 RX ADMIN — HYDROMORPHONE HYDROCHLORIDE 0.2 MG: 1 INJECTION, SOLUTION INTRAMUSCULAR; INTRAVENOUS; SUBCUTANEOUS at 20:45

## 2018-10-10 RX ADMIN — FENTANYL CITRATE 50 MCG: 50 INJECTION, SOLUTION INTRAMUSCULAR; INTRAVENOUS at 19:28

## 2018-10-10 RX ADMIN — ONDANSETRON 4 MG: 2 INJECTION INTRAMUSCULAR; INTRAVENOUS at 18:43

## 2018-10-10 RX ADMIN — Medication 2 G: at 18:40

## 2018-10-10 RX ADMIN — SODIUM CHLORIDE, SODIUM LACTATE, POTASSIUM CHLORIDE, AND CALCIUM CHLORIDE 25 ML/HR: 600; 310; 30; 20 INJECTION, SOLUTION INTRAVENOUS at 16:04

## 2018-10-10 NOTE — PERIOP NOTES
7:17 PM Spoke with Karla Schuler, Pharmacist about patient allergy and surgical pain solution. Pharmacist verified patient received Anca Cocker solution with previous surgery and okay to precede with surgical pain solution today.

## 2018-10-10 NOTE — PERIOP NOTES
16: 00= pt states she took 1000mg tylenol PO at 11:00; orders on physician order for ofirmev 1000mg IV, but [facility] order states 1000mg tylenol PO. Will check with Dr Jose Persaud to see which he wants given or if any since she had dosage at 11:00. 
 
16:10= declined Bárbara nicole; no mepilex used due to surgery position. 16:24= changed order for tylenol from PO to IV due to written order; 1000mg ofirmev IV given in Pre Op since last dose was at 11:00.

## 2018-10-10 NOTE — ANESTHESIA PREPROCEDURE EVALUATION
Anesthetic History No history of anesthetic complications Review of Systems / Medical History Patient summary reviewed, nursing notes reviewed and pertinent labs reviewed Pulmonary Smoker Neuro/Psych Psychiatric history Cardiovascular Within defined limits Exercise tolerance: <4 METS 
  
GI/Hepatic/Renal 
  
GERD Endo/Other Arthritis and cancer Other Findings Comments: Lumbago Fibromyalgia 
 
peripheral neuropathy Physical Exam 
 
Airway Mallampati: II 
TM Distance: 4 - 6 cm Neck ROM: normal range of motion Mouth opening: Normal 
 
 Cardiovascular Regular rate and rhythm,  S1 and S2 normal,  no murmur, click, rub, or gallop Dental 
 
Dentition: Full upper dentures and Full lower dentures Pulmonary Breath sounds clear to auscultation Abdominal 
GI exam deferred Other Findings Anesthetic Plan ASA: 2 Anesthesia type: general 
 
Monitoring Plan: BIS Induction: Intravenous Anesthetic plan and risks discussed with: Patient

## 2018-10-10 NOTE — H&P
Subjective:  
 
Patient ID: Abiel Schwarz is a 63 y.o. female. C/o RLE pain Patient Active Problem List  
 Diagnosis Date Noted  Spinal stenosis of lumbar region at multiple levels 10/10/2018  HNP (herniated nucleus pulposus), lumbar 06/18/2018  S/P discectomy 06/18/2018  Fibromyalgia 02/03/2015  Chronic back pain 02/03/2015  Risk for falls 11/10/2014  Gait abnormality 08/11/2014  Neuropathy 08/11/2014 Family History Problem Relation Age of Onset  Heart Disease Mother  High Cholesterol Mother  Hypertension Mother  High Cholesterol Father  Heart Disease Father  Obesity Father  Diabetes Father  Cancer Maternal Grandmother   
  colon  Anesth Problems Neg Hx Social History Substance Use Topics  Smoking status: Former Smoker Packs/day: 0.50 Years: 45.00 Quit date: 06/2018  Smokeless tobacco: Never Used  Alcohol use Yes Comment: once a month Past Medical History:  
Diagnosis Date  Arthritis RHEUMATOID AND OSTEOARTHRITIS  
 C. difficile diarrhea 2004  
 as of 10/23/14 pt denies any diarrhea or sx  Cancer ([facility] [address] 1982  
 uterine CA, hysterectomy, no chemo / radiation  Cancer [facility]) 2008 Rt BR; lumpectomy, radiation with chemo pill x6 yrs  Fibromyalgia  GERD (gastroesophageal reflux disease)  Hypercholesteremia  Neuropathy  Osteoarthritis  Other ill-defined conditions(799.89) GRANULOMA ANEULARE, SKIN DISORDER  
 Psychiatric disorder ANXIETY  Rheumatoid arthritis ([facility] [address]  Seasonal allergies Past Surgical History:  
Procedure Laterality Date [facility] COLECTOMY (1/2 REMOVED; \"WAS IN KNOTS\")  BREAST SURGERY PROCEDURE UNLISTED Right 2/2008 RIGHT BREAST LUMPECTOMY  COLONOSCOPY N/A 5/25/2018  COLONOSCOPY performed by Mary Ortega MD at [facility] ENDOSCOPY  
 HX CARPAL TUNNEL RELEASE Bilateral   
  HX CERVICAL DISKECTOMY  2/3/14 C4-5   
 HX COLONOSCOPY    
 HX GI    
 ENDOSCOPY (DILATATION/ESOPH)  HX HYSTERECTOMY  1982  
 partial  
 HX KNEE ARTHROSCOPY Left x2  
 HX LUMBAR DISKECTOMY  06/2018  HX MOHS PROCEDURES Right [facility] OR[facility]EDIC  2005, 2011 CERVICAL NECKx 3  
 HX OTHER SURGICAL  3/2008  
 hematoma s/p Rt BR lumpectomy [facility] T&A Current Facility-Administered Medications:  
  ceFAZolin (ANCEF) 2 g/20 mL in sterile water IV syringe, 2 g, IntraVENous, ONCE, Jasmin Lora MD 
  lactated Ringers infusion, 25 mL/hr, IntraVENous, CONTINUOUS, Eric Wright MD, Last Rate: 25 mL/hr at 10/10/18 1604, 25 mL/hr at 10/10/18 1604 
  lactated Ringers infusion, 25 mL/hr, IntraVENous, CONTINUOUS, Eric Wright MD 
  sodium chloride (NS) flush 5-10 mL, 5-10 mL, IntraVENous, Q8H, Eric Wright MD 
  sodium chloride (NS) flush 5-10 mL, 5-10 mL, IntraVENous, PRN, Eric Wright MD 
  lidocaine (PF) (XYLOCAINE) 10 mg/mL (1 %) injection 0.1 mL, 0.1 mL, SubCUTAneous, PRN, Eric Wright MD 
 
Allergies Allergen Reactions  Aspirin Other (comments) Blacked out. Talked with patient. Can take other NSAIDS like Ibuprofen, Naprosyn ROS:  
No new bowel or bladder incontinence. No fevers or chills. No saddle anesthesia. Objective:  
 
Visit Vitals  BP (!) 144/99 (BP 1 Location: Right arm, BP Patient Position: At rest)  Pulse 89  Temp 98.1 °F (36.7 °C)  Resp 15  Ht 5' 2\" (1.575 m)  Wt 65.7 kg (144 lb 13.5 oz)  SpO2 100%  BMI 26.49 kg/m2 Body mass index is 26.49 kg/(m^2). , a BMI over 30 is considered obese and a BMI over 40 has been associated with a higher risk of surgical complications. Constitutional: No acute distress. Well nourished. HEENT: Normocephalic. Respiratory:  No labored breathing. Cardiovascular:  No marked cyanosis. Skin:  No marked skin ulcers/lesions on bilateral upper or lower extremities. Psychiatric: Alert and oriented x3. Inspection: No gross deformity of bilateral upper or lower extremities. Musculoskeletal/Neurological:  
Unable to walk erect. Moves all toes Radiographs:   
  
Mri Lumb Spine W Wo Cont Result Date: 10/1/2018 EXAM:  MRI LUMB SPINE W WO CONT Clinical history: Lumbar myelopathy INDICATION:  Lumbar myelopathy. COMPARISON: 5/19/2018 TECHNIQUE: MR imaging of the lumbar spine was performed using the following sequences: sagittal T1, T2, STIR;  axial T1, T2 prior to and following contrast administration. CONTRAST: Patient was administered gadolinium-based contrast material axial and sagittal T1-weighted postcontrast and imaging was obtained. FINDINGS: Minimal retrolisthesis of L5 on S1. Abdominal aorta normal in caliber. Proximal common iliac vessels are normal in caliber. There is no acute fracture or dislocation. Left renal cyst. Small hemangioma at T12. Postprocedural changes at L4-5. L1-L2: There is minimal desiccation of this disc without stenosis L2-L3: No stenosis L3-L4: Disc bulge. Minimal ligamentum flavum hypertrophy. Canal is patent. Minimal left foraminal stenosis. L4-L5: Disc desiccation. Disc bulge. Minimal canal stenosis. There is a small disc extrusion superimposed on a right foraminal protrusion at the right lateral recess which is caudally oriented, not significantly changed in appearance compared to prior exam. Effacement of the right lateral recess posterior to the L5 vertebral body. There is a small amount of enhancing granulation tissue as well. The foramina at L4-5 are patent. There is effacement of nerve roots extending to the right and left L5-S1 foramina. L5-S1: There is degeneration of this disc with a borderline disc bulge asymmetric to the right. Mild ligamentum flavum hypertrophy. There is narrowing of the subarticular zones bilaterally asymmetric to the right with mild right foraminal narrowing. No interval change. IMPRESSION: Continued small right lateral recess/right foraminal disc extrusion caudally oriented at L4-L5. Severe right lateral recess stenosis posterior to L5. No central canal stenosis. Effacement of nerve roots extending to the right L5-S1 foramen Assessment:  
 
No diagnosis found. Plan:  
 
I have discussed the L4-5 decompression and fusion in detail with Raymond Keller and mentioned complications, including but not limited to: death, permanent disability, heart attack, stroke, lung injury or infection, blindness, ileus, bladder or bowel problems, ureter injury, bleeding, nerve injury (including numbness, pain and weakness), paralysis (which may be permanent), failure to heal, failure to fuse bone together in fusion procedures, failure to relief symptoms, failure to relief pain, increased pain, need for further surgeries, failure or breakage or hardware, malpositioning of hardware, need to fuse or operate on additional levels determined either during or after surgery, destabilization of the spine (which may require fusion or later surgery), infections (which may or may not require additional surgery), dural tears (tears of the sac holding in nerves and spinal fluid), meningitis, voice changes, vocal cord injury, hoarseness, blood clots, pulmonary embolus, Rony syndrome, recurrent disc herniation, diaphragm paralysis, and anesthetic complications. Comorbidities such as obesity, smoking, rheumatoid arthritis, chronic steroid use and diabetes increase these risks. Raymond Keller understands and wants to proceed.   
 
 
 
   
May Sutherland MD

## 2018-10-10 NOTE — IP AVS SNAPSHOT
[address] [facility] 
[phone] Patient: Raymond Keller MRN: [id number] XAP:15/18/2433 About your hospitalization You were admitted on:  October 10, 2018 You last received care in the:  [facility] ORTHOPEDICS You were discharged on:  October 11, 2018 Why you were hospitalized Your primary diagnosis was:  Not on File Your diagnoses also included:  Spinal Stenosis Of Lumbar Region At Multiple Levels Follow-up Information Follow up With Details Comments Contact Info May Sutherland MD In 2 weeks  [address] [facility] 
[phone] Viji Villa NP   [address] [address] [address] 
[phone] AT HOME CARE  This is your home health provider  [address] [place] [address] 
[phone] Discharge Orders None A check jamee indicates which time of day the medication should be taken. My Medications START taking these medications Instructions Each Dose to Equal  
 Morning Noon Evening Bedtime  
 oxyCODONE IR 5 mg immediate release tablet Commonly known as:  Margarita Gilles Your last dose was: Your next dose is: Take 1-2 Tabs by mouth every four (4) hours as needed. Max Daily Amount: 60 mg. If insurance prior auth./quantity restrictions apply, refer to and look up diagnosis code one prescription. Partial fill as needed is permitted. Please do not contact prescriber. 5-10 mg  
    
   
   
   
  
 polyethylene glycol 17 gram packet Commonly known as:  Arthuro Stephen Your last dose was: Your next dose is: Take 1 Packet by mouth daily as needed (constipation) for up to 15 days. 17 g  
    
   
   
   
  
 senna-docusate 8.6-50 mg per tablet Commonly known as:  Mahnomen Deer Your last dose was: Your next dose is: Take 1 Tab by mouth daily. 1 Tab CHANGE how you take these medications Instructions Each Dose to Equal  
 Morning Noon Evening Bedtime DULoxetine 60 mg capsule Commonly known as:  CYMBALTA What changed:  See the new instructions. Your last dose was: Your next dose is: TAKE 2 CAPSULES DAILY CONTINUE taking these medications Instructions Each Dose to Equal  
 Morning Noon Evening Bedtime  
 cyclobenzaprine 10 mg tablet Commonly known as:  FLEXERIL Your last dose was: Your next dose is: Take 1 Tab by mouth three (3) times daily as needed for Muscle Spasm(s). 10 mg  
    
   
   
   
  
 diphenhydrAMINE 50 mg tablet Commonly known as:  BENADRYL Your last dose was: Your next dose is: Take 100 mg by mouth two (2) times a day. Indications: ALLERGIC REACTIONS  
 100 mg  
    
   
   
   
  
 GABAPENTIN PO Your last dose was: Your next dose is: Take 1,200 mg by mouth three (3) times daily. 1200 mg  
    
   
   
   
  
 LOMOTIL 2.5-0.025 mg per tablet Generic drug:  diphenoxylate-atropine Your last dose was: Your next dose is: Take 1 Tab by mouth four (4) times daily as needed. 1 Tab NexIUM 20 mg capsule Generic drug:  esomeprazole Your last dose was: Your next dose is: Take 20 mg by mouth daily. Indications: Heartburn  
 20 mg  
    
   
   
   
  
 tiZANidine 4 mg capsule Commonly known as:  Ngoc Meneses Your last dose was: Your next dose is: Take 6 mg by mouth every six (6) hours as needed. 6 mg  
    
   
   
   
  
 TYLENOL EXTRA STRENGTH 500 mg tablet Generic drug:  acetaminophen Your last dose was: Your next dose is: Take 1,000 mg by mouth every six (6) hours as needed for Pain. 1000 mg  
    
   
   
   
  
 VOLTAREN 1 % Gel Generic drug:  diclofenac Your last dose was: Your next dose is:    
   
   
 Apply  to affected area as needed. ZANTAC 150 mg tablet Generic drug:  raNITIdine Your last dose was: Your next dose is: Take 150 mg by mouth two (2) times a day. Indications: Heartburn 150 mg  
    
   
   
   
  
 ZOCOR PO Your last dose was: Your next dose is: Take 20 mg by mouth nightly. 20 mg  
    
   
   
   
  
  
STOP taking these medications   
 doxycycline 100 mg tablet Commonly known as:  ADOXA Where to Get Your Medications Information on where to get these meds will be given to you by the nurse or doctor. ! Ask your nurse or doctor about these medications  
  oxyCODONE IR 5 mg immediate release tablet  
 polyethylene glycol 17 gram packet  
 senna-docusate 8.6-50 mg per tablet Opioid Education Prescription Opioids: What You Need to Know: 
 
 
DR. Avtar Finley Pain control: 
 Typically, we will prescribe a narcotic usually 1-2 tabs every four hours is  
 sufficient for the pain. Most patients need this only for the first few weeks. You 
 should discontinue this as the pain decreases. You should not drive while taking any narcotic pain medications. Constipation Pain medicines and anesthesia can be constipating-this can be prevented by gentle physical activity and drinking plenty of fluid. It should be treated with over-the-counter medications such as Miralax or suppositories, and/or Fleets enema. You should have a bowel movement at least every other day following surgery. Incision care Keep this area clean and dry. Your dressing is designed to stay in place for 5-7 days. You will be sent home with one additional dressing to change at that time. Leave this new dressing in place until our follow up visit in the office in about 10-14 days. If staples are in place, they should be removed about 14-20 days after surgery. You may shower with this impermeable dressing in place. DO NOT take a tub bath or go swimming until cleared by your doctor. DO NOT apply lotions, oils, or creams to incision. To increase and promote healing: 
? Stop Smoking (or at least cut back on smoking). ? Eat a well-balanced diet (high in protein and vitamin C) ? If your appetite is poor, consider nutritional supplements like Ensure, Glucerna, or Mills Instant Breakfast. 
? If you are diabetic, controlling you blood sugars is very important to prevent infection and promote wound healing. Nutrition: ? If you were on a supplement such as Ensure or Glucerna) while in the hospital, please continue using them with each meal for the next 30 days. ? Eat a well-balanced diet - High in protein, high in vitamins and minerals, especially vitamin C and zinc.  
 
Restrictions: 
 Remember your \"BLT's\" 1. Limited bending at waist 
  2. Lift no more than 10 pounds 3. No Twisting If you were given a brace, wear it when out of bed. Warning signs : Please call your physician immediately at [phone] if you have ? Bleeding from incision that is constant. ? Change in mental status (unusual behavior or confusion) ? If your incision develops redness or swelling 
? Change in wound drainage (increase in amount, color, or foul odor) ? [place] over 101.5 degrees Fahrenheit  
? Headache that is not relieved with pain medication ? Tenderness or redness in the calf of your leg Emergency: CALL 911 if you have ? Shortness of breath ? Chest pain ? Localized chest pain when coughing or taking a deep breath Follow-up Please call Dr. Randolph Villafana office for a follow up appointment in 2 weeks at [phone]. You can return to work when cleared by a physician. During normal business hours you may reach Dr. Oleg Flores' team directly at [phone] if you have concerns or questions. Jean Arizmendi [facility] Announcement We are excited to announce that we are making your provider's discharge notes available to you in [facility]. You will see these notes when they are completed and signed by the physician that discharged you from your recent hospital stay. If you have any questions or concerns about any information you see in [facility], please call the Health Information Department where you were seen or reach out to your Primary Care Provider for more information about your plan of care. Introducing [facility]! Dear Heavenly Delgado: 
Thank you for requesting a [facility] account. Our records indicate that you already have an active [facility] account. You can access your account anytime at [email or web address] Did you know that you can access your hospital and ER discharge instructions at any time in [facility]? You can also review all of your test results from your hospital stay or ER visit. Additional Information If you have questions, please visit the Frequently Asked Questions section of the [facility] website at [email or web address] Remember, [facility] is NOT to be used for urgent needs. For medical emergencies, dial 911. Now available from your iPhone and Android! Introducing Gurjit Gonzalez As a [facility] patient, I wanted to make you aware of our electronic visit tool called Gurjit Gonzalez. zhouwu 24/7 allows you to connect within minutes with a medical provider 24 hours a day, seven days a week via a mobile device or tablet or logging into a secure website from your computer. You can access Ykonenievesfin from anywhere in the [place]. A virtual visit might be right for you when you have a simple condition and feel like you just dont want to get out of bed, or cant get away from work for an appointment, when your regular [facility] provider is not available (evenings, weekends or holidays), or when youre out of town and need minor care. Electronic visits cost only $49 and if the [employer] provider determines a prescription is needed to treat your condition, one can be electronically transmitted to a nearby pharmacy*. Please take a moment to enroll today if you have not already done so. The enrollment process is free and takes just a few minutes. To enroll, please download the [employer] mary anne to your tablet or phone, or visit [email or web address] to enroll on your computer. And, as an [address] [place] [address] patient with a [email or web address] account, the results of your visits will be scanned into your electronic medical record and your primary care provider will be able to view the scanned results. We urge you to continue to see your regular zhouwu provider for your ongoing medical care. And while your primary care provider may not be the one available when you seek a Gurjit Anystreamnievesfin virtual visit, the peace of mind you get from getting a real diagnosis real time can be priceless. For more information on Gurjit Anystreamquintin, view our Frequently Asked Questions (FAQs) at [email or web address]. Sincerely, 
 
Caroline Moreno MD 
Chief Medical Officer Jack8 Dedra Rucker *:  certain medications cannot be prescribed via Gurjit Gonzalez Providers Seen During Your Hospitalization Provider Specialty Primary office phone Gonzalez Umanzor MD Orthopedic Surgery [phone] Your Primary Care Physician (PCP) Primary Care Physician Office Phone Office Fax Jean Ingram [phone] You are allergic to the following Allergen Reactions Aspirin Other (comments) Blacked out. Talked with patient. Can take other NSAIDS like Ibuprofen, Naprosyn Recent Documentation Height Weight BMI OB Status Smoking Status 1.575 m 65.7 kg 26.49 kg/m2 Hysterectomy Former Smoker Emergency Contacts Name Discharge Info Relation Home Work Mobile David Jovel DISCHARGE CAREGIVER [3] Spouse [3] [phone] Patient Belongings The following personal items are in your possession at time of discharge: 
  Dental Appliances: Uppers, With patient, Lowers  Visual Aid: Glasses, With patient      Home Medications: None   Jewelry: None  Clothing: Undergarments, With patient, Footwear, Pants, Shirt    Other Valuables: Eyeglasses, With patient, Prosthesis, Cane (prosthetic in bra)  Personal Items Sent to Safe: No valuables to send to security Please provide this summary of care documentation to your next provider. Signatures-by signing, you are acknowledging that this After Visit Summary has been reviewed with you and you have received a copy. Patient Signature:  ____________________________________________________________ Date:  ____________________________________________________________  
  
Lynette Cheney Provider Signature:  ____________________________________________________________ Date:  ____________________________________________________________

## 2018-10-11 ENCOUNTER — APPOINTMENT (OUTPATIENT)
Dept: GENERAL RADIOLOGY | Age: 63
DRG: 455 | End: 2018-10-11
Attending: ORTHOPAEDIC SURGERY
Payer: MEDICARE

## 2018-10-11 VITALS
WEIGHT: 144.84 LBS | TEMPERATURE: 98 F | HEIGHT: 62 IN | DIASTOLIC BLOOD PRESSURE: 73 MMHG | SYSTOLIC BLOOD PRESSURE: 123 MMHG | BODY MASS INDEX: 26.65 KG/M2 | HEART RATE: 97 BPM | RESPIRATION RATE: 18 BRPM | OXYGEN SATURATION: 100 %

## 2018-10-11 LAB — HGB BLD-MCNC: 11.7 G/DL (ref 11.5–16)

## 2018-10-11 PROCEDURE — 97535 SELF CARE MNGMENT TRAINING: CPT | Performed by: OCCUPATIONAL THERAPIST

## 2018-10-11 PROCEDURE — G8980 MOBILITY D/C STATUS: HCPCS

## 2018-10-11 PROCEDURE — 74011000250 HC RX REV CODE- 250: Performed by: ORTHOPAEDIC SURGERY

## 2018-10-11 PROCEDURE — 74011250637 HC RX REV CODE- 250/637: Performed by: ORTHOPAEDIC SURGERY

## 2018-10-11 PROCEDURE — G8979 MOBILITY GOAL STATUS: HCPCS

## 2018-10-11 PROCEDURE — 97165 OT EVAL LOW COMPLEX 30 MIN: CPT | Performed by: OCCUPATIONAL THERAPIST

## 2018-10-11 PROCEDURE — G8989 SELF CARE D/C STATUS: HCPCS | Performed by: OCCUPATIONAL THERAPIST

## 2018-10-11 PROCEDURE — 97161 PT EVAL LOW COMPLEX 20 MIN: CPT

## 2018-10-11 PROCEDURE — 97116 GAIT TRAINING THERAPY: CPT

## 2018-10-11 PROCEDURE — G8988 SELF CARE GOAL STATUS: HCPCS | Performed by: OCCUPATIONAL THERAPIST

## 2018-10-11 PROCEDURE — 94760 N-INVAS EAR/PLS OXIMETRY 1: CPT

## 2018-10-11 PROCEDURE — G8987 SELF CARE CURRENT STATUS: HCPCS | Performed by: OCCUPATIONAL THERAPIST

## 2018-10-11 PROCEDURE — 72100 X-RAY EXAM L-S SPINE 2/3 VWS: CPT

## 2018-10-11 PROCEDURE — 85018 HEMOGLOBIN: CPT | Performed by: ORTHOPAEDIC SURGERY

## 2018-10-11 PROCEDURE — G8978 MOBILITY CURRENT STATUS: HCPCS

## 2018-10-11 PROCEDURE — 36415 COLL VENOUS BLD VENIPUNCTURE: CPT | Performed by: ORTHOPAEDIC SURGERY

## 2018-10-11 PROCEDURE — 74011250636 HC RX REV CODE- 250/636: Performed by: ORTHOPAEDIC SURGERY

## 2018-10-11 RX ORDER — AMOXICILLIN 250 MG
1 CAPSULE ORAL DAILY
Qty: 30 TAB | Refills: 0 | Status: SHIPPED | OUTPATIENT
Start: 2018-10-11 | End: 2021-02-23

## 2018-10-11 RX ORDER — OXYCODONE HYDROCHLORIDE 5 MG/1
5-10 TABLET ORAL
Qty: 80 TAB | Refills: 0 | Status: SHIPPED | OUTPATIENT
Start: 2018-10-11 | End: 2021-03-03

## 2018-10-11 RX ORDER — POLYETHYLENE GLYCOL 3350 17 G/17G
17 POWDER, FOR SOLUTION ORAL
Qty: 15 PACKET | Refills: 0 | Status: SHIPPED | OUTPATIENT
Start: 2018-10-11 | End: 2018-10-26

## 2018-10-11 RX ADMIN — PANTOPRAZOLE SODIUM 40 MG: 40 TABLET, DELAYED RELEASE ORAL at 09:27

## 2018-10-11 RX ADMIN — Medication 2 G: at 10:11

## 2018-10-11 RX ADMIN — ACETAMINOPHEN 1000 MG: 500 TABLET ORAL at 12:03

## 2018-10-11 RX ADMIN — Medication 10 ML: at 14:03

## 2018-10-11 RX ADMIN — FAMOTIDINE 20 MG: 20 TABLET ORAL at 00:30

## 2018-10-11 RX ADMIN — OXYCODONE HYDROCHLORIDE 10 MG: 5 TABLET ORAL at 14:02

## 2018-10-11 RX ADMIN — ATORVASTATIN CALCIUM 10 MG: 10 TABLET, FILM COATED ORAL at 00:30

## 2018-10-11 RX ADMIN — OXYCODONE HYDROCHLORIDE 10 MG: 5 TABLET ORAL at 17:48

## 2018-10-11 RX ADMIN — ACETAMINOPHEN 1000 MG: 500 TABLET ORAL at 00:30

## 2018-10-11 RX ADMIN — DULOXETINE HYDROCHLORIDE 60 MG: 30 CAPSULE, DELAYED RELEASE ORAL at 00:30

## 2018-10-11 RX ADMIN — OXYCODONE HYDROCHLORIDE 15 MG: 5 TABLET ORAL at 10:11

## 2018-10-11 RX ADMIN — POLYETHYLENE GLYCOL 3350 17 G: 17 POWDER, FOR SOLUTION ORAL at 09:27

## 2018-10-11 RX ADMIN — GABAPENTIN 1200 MG: 300 CAPSULE ORAL at 17:47

## 2018-10-11 RX ADMIN — Medication 10 ML: at 06:45

## 2018-10-11 RX ADMIN — DIPHENHYDRAMINE HYDROCHLORIDE 100 MG: 25 CAPSULE ORAL at 09:27

## 2018-10-11 RX ADMIN — Medication 2 G: at 02:58

## 2018-10-11 RX ADMIN — ACETAMINOPHEN 1000 MG: 500 TABLET ORAL at 06:42

## 2018-10-11 RX ADMIN — OXYCODONE HYDROCHLORIDE 10 MG: 5 TABLET ORAL at 03:23

## 2018-10-11 RX ADMIN — DIPHENHYDRAMINE HYDROCHLORIDE 100 MG: 25 CAPSULE ORAL at 00:30

## 2018-10-11 RX ADMIN — STANDARDIZED SENNA CONCENTRATE AND DOCUSATE SODIUM 1 TABLET: 8.6; 5 TABLET, FILM COATED ORAL at 00:30

## 2018-10-11 RX ADMIN — STANDARDIZED SENNA CONCENTRATE AND DOCUSATE SODIUM 1 TABLET: 8.6; 5 TABLET, FILM COATED ORAL at 09:27

## 2018-10-11 RX ADMIN — DULOXETINE HYDROCHLORIDE 60 MG: 30 CAPSULE, DELAYED RELEASE ORAL at 09:27

## 2018-10-11 RX ADMIN — GABAPENTIN 1200 MG: 300 CAPSULE ORAL at 09:27

## 2018-10-11 RX ADMIN — FAMOTIDINE 20 MG: 20 TABLET ORAL at 09:27

## 2018-10-11 RX ADMIN — OXYCODONE HYDROCHLORIDE 10 MG: 5 TABLET ORAL at 06:42

## 2018-10-11 RX ADMIN — ACETAMINOPHEN 1000 MG: 500 TABLET ORAL at 17:47

## 2018-10-11 NOTE — PROGRESS NOTES
ORTHO POST OP SPINE PROGRESS NOTE 2018 Admit Date: 10/10/2018 Admit Diagnosis: LUMBAGO, RADICULOPATHY, H&P, STENOSIS, POST LAMINECTOMY Spinal stenosis of lumbar region at multiple levels Procedure: Procedure(s): 
L4-5 DECOMPRESSION AND FUSION Post Op day: 1 Day Post-Op Subjective:  
 
Deepti Patiño is a patient who has complaints Of pain in the low back with mild radiation into the right lower extremity status post L4-5 posterior decompression and fusion. She is tolerating p.o. and able to void. Review of Systems: Pertinent items are noted in HPI. Objective:  
 
PT/OT:  
Distance Ambulated:          
Time Ambulated (min):       
Ambulation Response:       
Assistive Device:              Assistive Device: Fall prevention device Vital Signs:   
Blood pressure 104/76, pulse 84, temperature 98 °F (36.7 °C), resp. rate 16, height 5' 2\" (1.575 m), weight 65.7 kg (144 lb 13.5 oz), SpO2 99 %. Temp (24hrs), Av.8 °F (36.6 °C), Min:97.5 °F (36.4 °C), Max:98.1 °F (36.7 °C) 
 
 
  
10/09 1901 - 10/11 0700 In: 1300 [I.V.:1300] Out: 50 LAB:   
Recent Labs 10/11/18 
 0523  10/08/18 
 1049 HGB  11.7  13.5 WBC   --   6.7 PLT   --   193 Wound/Drain Assessment: 
Drain:   
 
Dressing:  
 
Physical Exam: 
Neurological: no deficit Incision clean, dry, and intact 5/5 bilateral lower extremities Assessment:  
  
Patient Active Problem List  
Diagnosis Code  Gait abnormality R26.9  Neuropathy G62.9  Risk for falls Z91.81  
 Fibromyalgia M79.7  Chronic back pain M54.9, G89.29  
 HNP (herniated nucleus pulposus), lumbar M51.26  
 S/P discectomy S84.672  Spinal stenosis of lumbar region at multiple levels M48.061 Plan:  
 
Continue PT/OT/Rehab Discontinue: IV 
Consult: PT  and OT Discharge To: Home. Possibly with home health pending progress was PT/OT . Today versus tomorrow Signed By: LAVELL Burgess

## 2018-10-11 NOTE — PROGRESS NOTES
1750 - I have reviewed discharge instructions with the patient. The patient verbalized understanding. IV D/C'd. Patient taken by nurse to car. Care terminated at this time.

## 2018-10-11 NOTE — PROGRESS NOTES
Bedside shift change report given to Linda (oncoming nurse) by Manpreet Okeefe (offgoing nurse). Report included the following information SBAR.

## 2018-10-11 NOTE — OP NOTES
[facility]  OPERATIVE REPORT    Sagar Chambers  MR#: [id number]  : 1955  ACCOUNT #: [de-identified]   DATE OF SERVICE: 10/10/2018    PREOPERATIVE DIAGNOSES:  1. Recurrent disk herniation L4-L5 on the right. 2.  Lumbago. 3.  Right lower extremity sciatica. POSTOPERATIVE DIAGNOSES:     1. Recurrent disk herniation L4-L5 on the right. 2.  Lumbago. 3.  Right lower extremity sciatica. 4.  With iatrogenic instability at L4-L5. PROCEDURES PERFORMED:    1. L4-5 posterior fusion with a TLIF.  2.  L4-L5 posterior instrumentation. 3. L4-L5 insertion of anterior biomechanical device. 4.  Use of allograft bone for spine fusion. 5.  Local autograft bone for spine fusion. 6.  Bone marrow aspirate from the left posterior superior iliac spine for spinal fusion augmentation. 7. L5-S1 laminotomy for microdissection. SURGEON:  MD FIRST Liza Martino. ESTIMATED BLOOD LOSS:  50 mL. COMPLICATIONS:  None. SPECIMENS REMOVED:  None. ANESTHESIA:  General.    DRAINS:  None. IMPLANTS:  The DePuy Synthes matrix pedicle screw system and the Globus Altera TLIF Cage. INDICATIONS FOR PROCEDURE:  The patient is a very pleasant 63-year-old lady with a recurrent disk herniation at L4-L5 on the right. She has lumbago and sciatica and has failed to improve with nonoperative treatment. At this point, she elected to proceed with surgical intervention. I have given her warnings about the possible complications including but not limited to pain, scar, bleeding, infection, nonunion, damage to surrounding structures, death, paralysis, blindness, stroke. She understands and wants to proceed. DESCRIPTION OF PROCEDURE:  After informed consent was obtained and the operative site was properly marked, the patient was brought back to the operating room and underwent general endotracheal anesthesia.   She was positioned prone on the operating room table using the Nadya Maribentley table with 4-poster frame. Arms were placed in the 90/90 position, knees were gently bent with pillows. Fluoroscopy was used to jamee the level of the incision. We then proceeded to prep and drape in the usual manner. Timeout was obtained verifying that this was the correct patient, the correct surgery, the correct site, as well that she had received IV antibiotics within 30 minutes of the incision. I then proceeded to perform a standard posterior approach to the lumbar spine exposing the right side between L4 and L5. The area was identified under fluoroscopy and then I proceeded to demarcate the borders of the previous surgery for this previous laminotomy. I then used the Yvonne Patella to try to widen that laminotomy by the length of one of the AM-8 with the Midas Rene head and as I proceeded to do so, the inferior articular process of L4 on the right was completely detached, removing the superior half of the facet and creating iatrogenic instability. At this point, I then aborted the procedure and proceeded to bring in fluoroscopy in the AP view and exposed the left side and proceeded to place pedicle screws using the following technique and fluoroscopy, on the AP view of the intraoperative pedicle was marked at the 3 o'clock and 9 o'clock position using an awl/tap device. The awl/tap was advanced until it hit the medialwall of the pedicle and it was stimulated with electrocautery. No response noted, the awl/tap was removed and the premeasured screw was inserted. Once the screws were in place, I proceeded then to use a 60 mL syringe and a Jamshidi needle and aspirate 60 mL of bone marrow from the left posterior superior iliac spine and used it to augment the bone graft used in this procedure.   I then proceeded to create a path for the disk by removing the lateral aspect of the facet at L4-L5 on the right finding the disk space and then instrumented the disk with blunt shaver, sharp shaver straight and curved curettes, straight and curved pituitaries to remove the disk debris and cartilage endplate. I then packed into the interbody space with allograft bone followed by inserting my TLIF cage into anterior vertebral body going across midline turned 90 degrees and deployed to its final height. The cage was in appropriate position. I proceeded then to remove the remainder of the facet and exposed the thecal sac. The herniated disk, however, continued to extend distally and I had to perform an L5-S1 laminotomy by removing the superior aspect of the L5 lamina and exposing the thecal sac inferior to the L5 nerve root. Once that was completed, I was able to reach into the axilla and remove the disk herniation from the superior aspect. Once that area was decompressed, I inspected with a Jones ball, verified that there was no further free fragments and irrigated once using normal saline, decorticated the posterior elements bilaterally placing local autograft bone with cortical bone fibers. I proceeded then to place my rods and locking caps and finally tightened the construct in place. Final x-rays were taken and saved PAC. The fascia was closed with #1 Vicryl figure-of-8 interrupted sutures followed by subcutaneous closed with 2-0 Vicryl and the skin with a 3-0 running Monocryl and Dermabond. A sterile dressing was applied. The patient was then awakened and transferred to PACU in stable condition. POSTOPERATIVE PLAN:  The patient is going to remain here overnight. We are going to give her SCDs and RAJ hose for DVT prophylaxis, and Ancef for infection prophylaxis.       MD DONYA Dixon / CALDERON  D: 10/10/2018 20:19     T: 10/11/2018 08:25  JOB #: [id number]  CC: Clementine Hodgkin NP

## 2018-10-11 NOTE — DISCHARGE SUMMARY
Ortho Discharge Summary    Patient ID:  Casey Mcrae  [id number]  female  63 y.o.  1955    Admit date: 10/10/2018    Discharge date: 10/11/2018    Admitting Physician: Jasmin Lora MD     Consulting Physician(s):   Treatment Team: Attending Provider: Jasmin Lora MD; Utilization Review: Jovon Mendez RN; Care Manager: Shwetha Dalton    Date of Surgery:   10/10/2018     Preoperative Diagnosis:  LUMBAGO, RADICULOPATHY, H&P, STENOSIS, POST LAMINECTOMY    Postoperative Diagnosis:   LUMBAGO, RADICULOPATHY, H&P, STENOSIS, POST LAMINECTOMY    Procedure(s):     L4-5 DECOMPRESSION AND FUSION     Anesthesia Type:   General     Surgeon: Jasmin Lora MD                            HPI:  Pt is a 63 y.o. female who has a history of LUMBAGO, RADICULOPATHY, H&P, STENOSIS, POST LAMINECTOMY  with pain and limitations of activities of daily living who presents at this time for a L4-5 PSF following the failure of conservative management. PMH:   Past Medical History:   Diagnosis Date    Arthritis     RHEUMATOID AND OSTEOARTHRITIS    C. difficile diarrhea 2004    as of 10/23/14 pt denies any diarrhea or sx    Cancer ([facility] [address] 1982    uterine CA, hysterectomy, no chemo / radiation    Cancer ([facility] [address] 2008    Rt BR; lumpectomy, radiation with chemo pill x6 yrs    Fibromyalgia     GERD (gastroesophageal reflux disease)     Hypercholesteremia     Neuropathy     Osteoarthritis     Other ill-defined conditions(799.89)     GRANULOMA ANEULARE, SKIN DISORDER    Psychiatric disorder     ANXIETY    Rheumatoid arthritis (HCC)     Seasonal allergies        Body mass index is 26.49 kg/(m^2). : A BMI > 30 is classified as obesity and > 40 is classified as morbid obesity. Medications upon admission :   Prior to Admission Medications   Prescriptions Last Dose Informant Patient Reported? Taking?    DULoxetine (CYMBALTA) 60 mg capsule 10/10/2018 at 1100  No Yes   Sig: TAKE 2 CAPSULES DAILY   Patient taking differently: TAKES ONE TABLET TWICE PER DAY   GABAPENTIN PO 10/10/2018 at 1100  Yes Yes   Sig: Take 1,200 mg by mouth three (3) times daily. SIMVASTATIN (ZOCOR PO) 10/3/2018 at Unknown time  Yes Yes   Sig: Take 20 mg by mouth nightly. acetaminophen (TYLENOL EXTRA STRENGTH) 500 mg tablet 10/10/2018 at 1100  Yes Yes   Sig: Take 1,000 mg by mouth every six (6) hours as needed for Pain. cyclobenzaprine (FLEXERIL) 10 mg tablet 10/10/2018 at 1100  No Yes   Sig: Take 1 Tab by mouth three (3) times daily as needed for Muscle Spasm(s). diclofenac (VOLTAREN) 1 % gel Not Taking at Unknown time  Yes No   Sig: Apply  to affected area as needed. diphenhydrAMINE (BENADRYL) 50 mg tablet 10/10/2018 at 1100  Yes Yes   Sig: Take 100 mg by mouth two (2) times a day. Indications: ALLERGIC REACTIONS   diphenoxylate-atropine (LOMOTIL) 2.5-0.025 mg per tablet 10/3/2018 at Unknown time  Yes Yes   Sig: Take 1 Tab by mouth four (4) times daily as needed. doxycycline (ADOXA) 100 mg tablet 10/10/2018 at 1100  Yes Yes   Sig: Take 100 mg by mouth two (2) times a day. esomeprazole (NEXIUM) 20 mg capsule 10/10/2018 at 1100  Yes Yes   Sig: Take 20 mg by mouth daily. Indications: Heartburn   raNITIdine (ZANTAC) 150 mg tablet 10/10/2018 at 1100  Yes Yes   Sig: Take 150 mg by mouth two (2) times a day. Indications: Heartburn   tiZANidine (ZANAFLEX) 4 mg capsule 10/10/2018 at 1400  Yes Yes   Sig: Take 6 mg by mouth every six (6) hours as needed. Facility-Administered Medications: None        Allergies: Allergies   Allergen Reactions    Aspirin Other (comments)     Blacked out. Talked with patient. Can take other NSAIDS like Ibuprofen, Naprosyn        Hospital Course: The patient underwent surgery. Complications:  None; patient tolerated the procedure well. Was taken to the PACU in stable condition and then transferred to the ortho floor.       Perioperative Antibiotics:  Ancef     Postoperative Pain Management:  Oxycodone      Postoperative transfusions:    Number of units banked PRBCs =   none     Post Op complications: none    Hemoglobin at discharge:    Lab Results   Component Value Date/Time    HGB 11.7 10/11/2018 05:23 AM    INR 1.0 10/08/2018 10:49 AM       Dressing was changed on POD # 1. Incision - clean, dry and intact. No significant erythema or swelling. Neurovascular exam found to be within normal limits. Wound appears to be healing without any evidence of infection. Physical Therapy started on the day following surgery and participated in bed mobility, transfers and ambulation. Gait:  Gait  Base of Support: Widened  Speed/Lesvia: Pace decreased (<100 feet/min)  Ambulation - Level of Assistance: Stand-by assistance  Distance (ft): 100 Feet (ft)  Assistive Device: Gait belt, Walker, rolling  Rail Use: Left   Stairs - Level of Assistance: Contact guard assistance  Number of Stairs Trained: 4                   Discharged to: Home with Home Health. Condition on Discharge:   stable    Discharge instructions:  - Take pain medications as prescribed  - Resume pre hospital diet      - Discharge activity: activity as tolerated  - Ambulate with assistive device as needed. - Wound Care Keep wound clean and dry. See discharge instruction sheet. -DISCHARGE MEDICATION LIST     Current Discharge Medication List      START taking these medications    Details   oxyCODONE IR (ROXICODONE) 5 mg immediate release tablet Take 1-2 Tabs by mouth every four (4) hours as needed. Max Daily Amount: 60 mg. If insurance prior auth./quantity restrictions apply, refer to and look up diagnosis code one prescription. Partial fill as needed is permitted. Please do not contact prescriber. Qty: 80 Tab, Refills: 0    Associated Diagnoses: S/P spinal fusion      senna-docusate (PERICOLACE) 8.6-50 mg per tablet Take 1 Tab by mouth daily.   Qty: 30 Tab, Refills: 0      polyethylene glycol (MIRALAX) 17 gram packet Take 1 Packet by mouth daily as needed (constipation) for up to 15 days. Qty: 15 Packet, Refills: 0         CONTINUE these medications which have NOT CHANGED    Details   acetaminophen (TYLENOL EXTRA STRENGTH) 500 mg tablet Take 1,000 mg by mouth every six (6) hours as needed for Pain. tiZANidine (ZANAFLEX) 4 mg capsule Take 6 mg by mouth every six (6) hours as needed. raNITIdine (ZANTAC) 150 mg tablet Take 150 mg by mouth two (2) times a day. Indications: Heartburn      esomeprazole (NEXIUM) 20 mg capsule Take 20 mg by mouth daily. Indications: Heartburn      GABAPENTIN PO Take 1,200 mg by mouth three (3) times daily. DULoxetine (CYMBALTA) 60 mg capsule TAKE 2 CAPSULES DAILY  Qty: 180 Cap, Refills: 2    Associated Diagnoses: Neuropathy; Gait abnormality      cyclobenzaprine (FLEXERIL) 10 mg tablet Take 1 Tab by mouth three (3) times daily as needed for Muscle Spasm(s). Qty: 270 Tab, Refills: 0    Associated Diagnoses: Neuropathy; Gait abnormality      diphenhydrAMINE (BENADRYL) 50 mg tablet Take 100 mg by mouth two (2) times a day. Indications: ALLERGIC REACTIONS      SIMVASTATIN (ZOCOR PO) Take 20 mg by mouth nightly. diphenoxylate-atropine (LOMOTIL) 2.5-0.025 mg per tablet Take 1 Tab by mouth four (4) times daily as needed. diclofenac (VOLTAREN) 1 % gel Apply  to affected area as needed. STOP taking these medications       doxycycline (ADOXA) 100 mg tablet Comments:   Reason for Stopping:            per medical continuation form      -Follow up in office in 2 weeks      Signed:  Liz Delgado.  Ruthellen Severin, MICHELLEP-BC, ONP-C  Orthopaedic Nurse Practitioner    10/11/2018  1:07 PM

## 2018-10-11 NOTE — PROGRESS NOTES
physical Therapy EVALUATION/DISCHARGE Patient: Abiel Schwarz [de-identified]63 y.o. female) Date: 10/11/2018 Primary Diagnosis: LUMBAGO, RADICULOPATHY, H&P, STENOSIS, POST LAMINECTOMY Spinal stenosis of lumbar region at multiple levels Procedure(s) (LRB): 
L4-5 DECOMPRESSION AND FUSION (N/A) 1 Day Post-Op Precautions:   Back (brace) ASSESSMENT : 
Based on the objective data described below, the patient presents with slight decreased pace with gait efforts today secondary to surgical intervention, no LOB. Pt able to achieve stair training safely, pt has spouse to assist prn at home. Pt safe to discharge home with AD use and HHPT recommended for safety at this time. Skilled physical therapy is not indicated at this time. PLAN : 
Discharge Recommendations: Home Health Further Equipment Recommendations for Discharge: none, pt has RW SUBJECTIVE:  
Patient stated I can do that.  OBJECTIVE DATA SUMMARY:  
HISTORY:   
Past Medical History:  
Diagnosis Date  Arthritis RHEUMATOID AND OSTEOARTHRITIS  
 C. difficile diarrhea 2004  
 as of 10/23/14 pt denies any diarrhea or sx  Cancer ([facility] [address] 1982  
 uterine CA, hysterectomy, no chemo / radiation  Cancer [facility]) 2008 Rt BR; lumpectomy, radiation with chemo pill x6 yrs  Fibromyalgia  GERD (gastroesophageal reflux disease)  Hypercholesteremia  Neuropathy  Osteoarthritis  Other ill-defined conditions(799.89) GRANULOMA ANEULARE, SKIN DISORDER  
 Psychiatric disorder ANXIETY  Rheumatoid arthritis ([facility] [address]  Seasonal allergies Past Surgical History:  
Procedure Laterality Date [address] COLECTOMY (1/2 REMOVED; \"WAS IN KNOTS\")  BREAST SURGERY PROCEDURE UNLISTED Right 2/2008 RIGHT BREAST LUMPECTOMY  COLONOSCOPY N/A 5/25/2018 COLONOSCOPY performed by Mary Ortega MD at [facility] ENDOSCOPY  
 HX CARPAL TUNNEL RELEASE Bilateral   
 HX CERVICAL DISKECTOMY  2/3/14 C4-5  HX COLONOSCOPY    
 HX GI    
 ENDOSCOPY (DILATATION/ESOPH)  HX HYSTERECTOMY  1982  
 partial  
 HX KNEE ARTHROSCOPY Left x2  
 HX LUMBAR DISKECTOMY  06/2018  HX MOHS PROCEDURES Right [facility]  2005, 2011 CERVICAL NECKx 3  
 HX OTHER SURGICAL  3/2008  
 hematoma s/p Rt BR lumpectomy [address] [facility] T&A Prior Level of Function/Home Situation: mod. I with AD use \"intermittently\" Personal factors and/or comorbidities impacting plan of care:  
 
Home Situation Home Environment: Private residence # Steps to Enter: 4 Rails to Enter: Yes Hand Rails : Bilateral 
One/Two Story Residence: Two story, live on 1st floor # of Interior Steps: 15 Interior Rails: Right Living Alone: No 
Support Systems: Spouse/Significant Other/Partner Patient Expects to be Discharged to[de-identified] Private residence Current DME Used/Available at Home: Cane, straight, Commode, bedside, Grab bars, Shower chair, Walker, rolling, Walker, rollator Tub or Shower Type: Shower EXAMINATION/PRESENTATION/DECISION MAKING:  
Critical Behavior: 
Neurologic State: Alert Orientation Level: Oriented X4 Cognition: Appropriate decision making, Appropriate for age attention/concentration, Appropriate safety awareness, Follows commands Hearing: 
  
Skin:  Intact (did not observe incision site) Edema: none Range Of Motion: 
AROM: Within functional limits Strength:   
Strength: Within functional limits Tone & Sensation:  
Tone: Normal 
  
  
  
  
Sensation: Intact Coordination: 
Coordination: Within functional limits Vision:  
Acuity: Within Defined Limits Corrective Lenses: Glasses Functional Mobility: 
Bed Mobility: 
  
  
  
Scooting: Independent Transfers: 
Sit to Stand: Modified independent Stand to Sit: Modified independent Bed to Chair: Independent Balance:  
Sitting: Intact Standing: Intact Ambulation/Gait Training: Distance (ft): 100 Feet (ft) Assistive Device: Gait belt;Walker, rolling Ambulation - Level of Assistance: Stand-by assistance Gait Description (WDL): Exceptions to [facility] Base of Support: Widened Speed/Lesvia: Pace decreased (<100 feet/min) Stairs: 
Number of Stairs Trained: 4 Stairs - Level of Assistance: Contact guard assistance Rail Use: Left Functional Measure: 
Barthel Index: 
 
Bathin Bladder: 10 Bowels: 10 
Groomin Dressing: 10 Feeding: 10 Mobility: 15 
Stairs: 10 Toilet Use: 10 Transfer (Bed to Chair and Back): 15 Total: 100 Barthel and G-code impairment scale: 
Percentage of impairment CH 
0% CI 
1-19% CJ 
20-39% CK 
40-59% CL 
60-79% CM 
80-99% CN 
100% Barthel Score 0-100 100 99-80 79-60 59-40 20-39 1-19 
 0 Barthel Score 0-20 20 17-19 13-16 9-12 5-8 1-4 0 The Barthel ADL Index: Guidelines 1. The index should be used as a record of what a patient does, not as a record of what a patient could do. 2. The main aim is to establish degree of independence from any help, physical or verbal, however minor and for whatever reason. 3. The need for supervision renders the patient not independent. 4. A patient's performance should be established using the best available evidence. Asking the patient, friends/relatives and nurses are the usual sources, but direct observation and common sense are also important. However direct testing is not needed. 5. Usually the patient's performance over the preceding 24-48 hours is important, but occasionally longer periods will be relevant. 6. Middle categories imply that the patient supplies over 50 per cent of the effort. 7. Use of aids to be independent is allowed. Bree Villalobos., Barthel, D.W. (9957). Functional evaluation: the Barthel Index. [facility] (14)2.  
RICHARD Caraballo, Wendy Haskins., Kelly Mckeon.Zeina. (1999). Measuring the change indisability after inpatient rehabilitation; comparison of the responsiveness of the Barthel Index and Functional [place] Measure. Journal of Neurology, Neurosurgery, and Psychiatry, 66(4), 911-398. ROLY Ramírez, LEONARDO Lloyd, & Beau Madrigal M.A. (2004.) Assessment of post-stroke quality of life in cost-effectiveness studies: The usefulness of the Barthel Index and the EuroQoL-5D. [facility], 13, 630-99 G codes: In compliance with CMSs Claims Based Outcome Reporting, the following G-code set was chosen for this patient based on their primary functional limitation being treated: The outcome measure chosen to determine the severity of the functional limitation was the Barthel with a score of 100/100 which was correlated with the impairment scale. ? Mobility - Walking and Moving Around:  
  - CURRENT STATUS: CH - 0% impaired, limited or restricted  - GOAL STATUS: CH - 0% impaired, limited or restricted  - D/C STATUS:  CH - 0% impaired, limited or restricted Physical Therapy Evaluation Charge Determination History Examination Presentation Decision-Making MEDIUM  Complexity : 1-2 comorbidities / personal factors will impact the outcome/ POC  LOW Complexity : 1-2 Standardized tests and measures addressing body structure, function, activity limitation and / or participation in recreation  LOW Complexity : Stable, uncomplicated  Other outcome measures Barthel  LOW Based on the above components, the patient evaluation is determined to be of the following complexity level: LOW Pain: 
Pain Scale 1: Numeric (0 - 10) Pain Intensity 1: 9 Pain Location 1: Back Activity Tolerance:  
Good: 
Please refer to the flowsheet for vital signs taken during this treatment. After treatment:  
[x]   Patient left in no apparent distress sitting up on couch 
[]   Patient left in no apparent distress in bed [x]   Call bell left within reach [x]   Nursing notified 
[]   Caregiver present 
[]   Bed alarm activated COMMUNICATION/EDUCATION:  
Communication/Collaboration: 
[x]   Fall prevention education was provided and the patient/caregiver indicated understanding. [x]   Patient/family have participated as able and agree with findings and recommendations. []   Patient is unable to participate in plan of care at this time. Findings and recommendations were discussed with: Registered Nurse Thank you for this referral. 
Estefani Wesley, PT, DPT Time Calculation: 33 mins

## 2018-10-11 NOTE — PROGRESS NOTES
TRANSFER - IN REPORT: 
 
Verbal report received from Johana Ma (name) on Shyla Reagan  being received from Pacu(unit) for routine post - op Report consisted of patients Situation, Background, Assessment and  
Recommendations(SBAR). Information from the following report(s) SBAR, Kardex, Procedure Summary, Intake/Output, MAR and Cardiac Rhythm nsr was reviewed with the receiving nurse. Opportunity for questions and clarification was provided. Assessment completed upon patients arrival to unit and care assumed.

## 2018-10-11 NOTE — ANESTHESIA POSTPROCEDURE EVALUATION
Post-Anesthesia Evaluation and Assessment Patient: Calixto Ramirez MRN: [id number]  SSN: [id number] YOB: 1955  Age: 63 y.o. Sex: female Cardiovascular Function/Vital Signs Visit Vitals  /54 (BP 1 Location: Right arm, BP Patient Position: At rest)  Pulse 78  Temp 36.7 °C (98 °F)  Resp 11  
 Ht 5' 2\" (1.575 m)  Wt 65.7 kg (144 lb 13.5 oz)  SpO2 100%  BMI 26.49 kg/m2 Patient is status post general anesthesia for Procedure(s): 
L4-5 DECOMPRESSION AND FUSION. Nausea/Vomiting: None Postoperative hydration reviewed and adequate. Pain: 
Pain Scale 1: Numeric (0 - 10) (10/10/18 2115) Pain Intensity 1: 4 (10/10/18 2115) Managed Neurological Status:  
Neuro (WDL): Exceptions to [facility] (10/10/18 2115) Neuro Neurologic State: Drowsy; Eyes open to stimulus; Eyes open spontaneously; Eyes open to voice;Sleeping (10/10/18 2115) Orientation Level: Oriented to person;Oriented to place;Oriented to situation (10/10/18 2115) Cognition: Follows commands (10/10/18 2115) Speech: Clear (10/10/18 2115) LUE Motor Response: Purposeful (10/10/18 2115) LLE Motor Response: Purposeful (10/10/18 2115) RUE Motor Response: Purposeful (10/10/18 2115) RLE Motor Response: Purposeful (10/10/18 2115) At baseline Mental Status and Level of Consciousness: Arousable Pulmonary Status:  
O2 Device: Nasal cannula (10/10/18 2030) Adequate oxygenation and airway patent Complications related to anesthesia: None Post-anesthesia assessment completed. No concerns Signed By: Ramon Pavon MD   
 October 10, 2018

## 2018-10-11 NOTE — PROGRESS NOTES
Ortho / Neurosurgery NP Note POD# 1  s/p L4-5 DECOMPRESSION AND FUSION Pt seen with nursing Sitting in bench. No complaints. Pain fairly well controlled with PO regimen VSS Afebrile. Voiding status: + void Labs Lab Results Component Value Date/Time HGB 11.7 10/11/2018 05:23 AM  
  
Lab Results Component Value Date/Time INR 1.0 10/08/2018 10:49 AM  
  
 
Body mass index is 26.49 kg/(m^2). : A BMI > 30 is classified as obesity and > 40 is classified as morbid obesity. Dressing c.d.i Cryotherapy in place over incision Calves soft and supple; No pain with passive stretch Sensation and motor intact SCDs for mechanical DVT proph while in bed PLAN: 
1) PT BID 2) Readniess for discharge: 
   [x] Vital Signs stable  
 [x] Hgb stable  
 [x] + Voiding  
 [x] Wound intact, drainage minimal  
 [x] Tolerating PO intake   
 [] Cleared by PT (OT if applicable) [] Stair training completed (if applicable) [] Independent / Contact Guard Assist (household distance) [] Bed mobility [] Car transfers  
  [] ADLs [x] Adequate pain control on oral medication alone Home with  today if clears PT Rickey Ordonez, RENE 
DNP, ACNP-BC, ONP-C

## 2018-10-11 NOTE — BRIEF OP NOTE
BRIEF OPERATIVE NOTE Date of Procedure: 10/10/2018 Preoperative Diagnosis: LUMBAGO, RADICULOPATHY, H&P, STENOSIS, POST LAMINECTOMY Postoperative Diagnosis: LUMBAGO, RADICULOPATHY, H&P, STENOSIS, POST LAMINECTOMY Procedure(s): 
L4-5 DECOMPRESSION AND FUSION Surgeon(s) and Role: 
   * Michaela Forbes MD - Primary Surgical Assistant: Adrien Ruiz Surgical Staff: 
Circ-1: Dianne Pena RN 
Circ-Relief: Zuleima Deleon RN Radiology Technician: Dax Ledezma Scrub Tech-1: Carlitos Bowman Surg Asst-1: Jeffon Raheel Event Time In Incision Start 1858 Incision Close Anesthesia: General  
Estimated Blood Loss: 50cc Specimens: * No specimens in log * Findings: HNP Complications: None Implants:  
Implant Name Type Inv. Item Serial No.  Lot No. LRB No. Used Action GRAFT FIBERS BNE JOSE 10CC -- 3DEMIN - [id number]  GRAFT FIBERS BNE JOSE 10CC -- 3DEMIN [id number] [employer] NA N/A 1 Implanted GRAFT SPNG DMB CANC [id number] -- NEVOS - [id number]  GRAFT SPNG DMB CANC [id number] -- NEVOS [id number] [employer] NA N/A 1 Implanted SPACER SPNE 15D [id number] -- ALTERA - SN/A  SPACER SPNE 15D [id number] -- [employer] Pandy N/A ET Water N/A N/A 1 Implanted SCR SPNE SWAPNA THRD [id number] TI -- MATRIX - SN/A  SCR SPNE SWAPNA THRD 7X45MM TI -- MATRIX N/A SYNTHES [place] N/A N/A 4 Implanted SCR SPN HEAD THRD POLY 5.5MM -- MATRIX - SN/A  SCR SPN HEAD THRD POLY 5.5MM -- MATRIX N/A SYNTHES [place] N/A N/A 4 Implanted CAP LCK W/O SADL -- MATRIX - SN/A  CAP LCK W/O SADL -- MATRIX N/A SYNTHES [place] N/A N/A 4 Implanted PRISCILLA SPNE [id number] RAD/35MM -- TI MATRIX MIS - SN/A  PRISCILLA SPNE [id number] RAD/35MM -- TI MATRIX MIS N/A SYNTHES [place] N/A N/A 1 Implanted PRISCILLA SPNE [id number] RAD/40MM -- TI MATRIX MIS - SN/A   PRISCILLA SPNE [id number] RAD/40MM -- TI MATRIX MIS N/A SYNTHES [place] N/A N/A 1 Implanted

## 2018-10-11 NOTE — ROUTINE PROCESS
TRANSFER - IN REPORT: 
 
Verbal report received from MONI Hayes RN(name) on Prudy Spikes  being received from OR(unit) for routine post - op Report consisted of patients Situation, Background, Assessment and  
Recommendations(SBAR). Information from the following report(s) OR Summary was reviewed with the receiving nurse. Opportunity for questions and clarification was provided. Assessment completed upon patients arrival to unit and care assumed.

## 2018-10-11 NOTE — DISCHARGE INSTRUCTIONS
[address]    Discharge Instruction Sheet: POSTERIOR SPINAL FUSION    DR. Nury Niño    Pain control:   Typically, we will prescribe a narcotic usually 1-2 tabs every four hours is    sufficient for the pain. Most patients need this only for the first few weeks. You   should discontinue this as the pain decreases. You should not drive while taking any narcotic pain medications. Constipation  Pain medicines and anesthesia can be constipating-this can be prevented by gentle physical activity and drinking plenty of fluid. It should be treated with over-the-counter medications such as Miralax or suppositories, and/or Fleets enema. You should have a bowel movement at least every other day following surgery. Incision care     Keep this area clean and dry. Your dressing is designed to stay in place for 5-7 days. You will be sent home with one additional dressing to change at that time. Leave this new dressing in place until our follow up visit in the office in about 10-14 days. If staples are in place, they should be removed about 14-20 days after surgery. You may shower with this impermeable dressing in place. DO NOT take a tub bath or go swimming until cleared by your doctor. DO NOT apply lotions, oils, or creams to incision. To increase and promote healing:   Stop Smoking (or at least cut back on smoking).  Eat a well-balanced diet (high in protein and vitamin C)   If your appetite is poor, consider nutritional supplements like Ensure, Glucerna, or [place] Instant Breakfast.   If you are diabetic, controlling you blood sugars is very important to prevent infection and promote wound healing. Nutrition:   If you were on a supplement such as Ensure or Glucerna) while in the hospital, please continue using them with each meal for the next 30 days.    Eat a well-balanced diet - High in protein, high in vitamins and minerals, especially vitamin C and zinc.     Restrictions:   Remember your \"BLT's\"    1. Limited bending at waist    2. Lift no more than 10 pounds    3. No Twisting     If you were given a brace, wear it when out of bed. Warning signs : Please call your physician immediately at [phone] if you have   Bleeding from incision that is constant.  Change in mental status (unusual behavior or confusion)   If your incision develops redness or swelling   Change in wound drainage (increase in amount, color, or foul odor)   [place] over 101.5 degrees Fahrenheit    Headache that is not relieved with pain medication   Tenderness or redness in the calf of your leg    Emergency: CALL 911 if you have   Shortness of breath   Chest pain   Localized chest pain when coughing or taking a deep breath    Follow-up  Please call Dr. Aleksandra Arora office for a follow up appointment in 2 weeks at [phone]. You can return to work when cleared by a physician. During normal business hours you may reach Dr. Brennan Hurtado' team directly at [phone] if you have concerns or questions.     Marisela Peña

## 2018-10-11 NOTE — PROGRESS NOTES
Reason for Admission:  LUMBAGO, RADICULOPATHY, H&P, STENOSIS, POST LAMINECTOMY; Spinal stenosis of lumbar region at [facility]* RRAT Score:  7 LOW Plan for utilizing home health: Per recommendation Likelihood of Readmission:  Moderate per pt acuity Transition of Care Plan:                 
Pt is a 63year old,  female, admitted with LUMBAGO, RADICULOPATHY, H&P, STENOSIS, POST LAMINECTOMY; Spinal stenosis of lumbar region at [facility]. Pt was alert and oriented when meeting with CM confirming address, emergency contact and PCP. Pt states she lives in a 2 level home with her  and predominately stays on the first floor. Pt has a cane and walker at home. Pt has not had HH or been to a SNF in the past. Pt requested [facility] but they could not accept. At [address] accepted pt via [employer]. AVS updated. Pt's preferred [place] [facility] [employer] [facility] At time of discharge pt friend will drive her home. CM offered to set up [facility] for pt due to delay in friend coming, pt declined on multiple attempts throughout the day. Pt has advanced directives and no problems financially accessing medications. Pt has no questions or concerns regarding discharge. No further CM needs identified. Care Management Interventions Mode of Transport at Discharge: Self Transition of Care Consult (CM Consult): Home Health [address] [place] [address] Road: No 
Reason Outside [employer] Chosen: Unable to staff case [employer] Signup: No 
Discharge Durable Medical Equipment: No 
Health Maintenance Reviewed: Yes Physical Therapy Consult: Yes Occupational Therapy Consult: Yes Speech Therapy Consult: No 
Current Support Network: Lives with Spouse, Own Home Confirm Follow Up Transport: Friends Plan discussed with Pt/Family/Caregiver: Yes Freedom of Choice Offered: Yes Discharge Location Discharge Placement: Home with home health EVA Timmons, [facility] [phone]

## 2018-10-11 NOTE — PERIOP NOTES
TRANSFER - OUT REPORT: 
 
Verbal report given to [facility] RN(name) on Benjamín Metcalf  being transferred to [facility](unit) for routine progression of care Report consisted of patients Situation, Background, Assessment and  
Recommendations(SBAR). Information from the following report(s) OR Summary, Intake/Output and MAR was reviewed with the receiving nurse. Opportunity for questions and clarification was provided. Patient transported with: 
 O2 @ 2 liters Registered Nurse

## 2018-10-11 NOTE — PROGRESS NOTES
Occupational Therapy EVALUATION/discharge Patient: Ana Hernandez [de-identified]63 y.o. female) Date: 10/11/2018 Primary Diagnosis: LUMBAGO, RADICULOPATHY, H&P, STENOSIS, POST LAMINECTOMY Spinal stenosis of lumbar region at multiple levels Procedure(s) (LRB): 
L4-5 DECOMPRESSION AND FUSION (N/A) 1 Day Post-Op Precautions: Spine  Back ASSESSMENT:  
Based on the objective data described below, the patient presents with post op spine precautions which impact her functional performance, but is otherwise without any other deficits. Patient able to recall 3/3 spine precautions and adhered to them during ADLs, functional mobility and the performance of ADL setup after training was provided. Patient was able to demonstrate the ability to complete ADLs and all functional mobility at an independent to mod I level. At this point the patient is without further OT needs, now that all needed training and education has been completed. Discharge Recommendations: None Further Equipment Recommendations for Discharge: none OBJECTIVE DATA SUMMARY:  
HISTORY:  
Past Medical History:  
Diagnosis Date  Arthritis RHEUMATOID AND OSTEOARTHRITIS  
 C. difficile diarrhea 2004  
 as of 10/23/14 pt denies any diarrhea or sx  Cancer ([facility] [address] 1982  
 uterine CA, hysterectomy, no chemo / radiation  Cancer [facility]) 2008 Rt BR; lumpectomy, radiation with chemo pill x6 yrs  Fibromyalgia  GERD (gastroesophageal reflux disease)  Hypercholesteremia  Neuropathy  Osteoarthritis  Other ill-defined conditions(799.89) GRANULOMA ANEULARE, SKIN DISORDER  
 Psychiatric disorder ANXIETY  Rheumatoid arthritis ([facility] [address]  Seasonal allergies Past Surgical History:  
Procedure Laterality Date [address] COLECTOMY (1/2 REMOVED; \"WAS IN KNOTS\")  BREAST SURGERY PROCEDURE UNLISTED Right 2/2008 RIGHT BREAST LUMPECTOMY  COLONOSCOPY N/A 5/25/2018 COLONOSCOPY performed by Luigi Acharya MD at [facility] ENDOSCOPY  
 HX CARPAL TUNNEL RELEASE Bilateral   
 HX CERVICAL DISKECTOMY  2/3/14 C4-5   
 HX COLONOSCOPY    
 HX GI    
 ENDOSCOPY (DILATATION/ESOPH)  HX HYSTERECTOMY  1982  
 partial  
 HX KNEE ARTHROSCOPY Left x2  
 HX LUMBAR DISKECTOMY  06/2018  HX MOHS PROCEDURES Right [facility]  2005, 2011 CERVICAL NECKx 3  
 HX OTHER SURGICAL  3/2008  
 hematoma s/p Rt BR lumpectomy [facility] Prior Level of Function/Environment/Context: Independent Occupations in which the patient is/was successful, what are the barriers preventing that success:  
Performance Patterns (routines, roles, habits, and rituals):  
Personal Interests and/or values:  
Expanded or extensive additional review of patient history:  
 
Home Situation Home Environment: Private residence # Steps to Enter: 4 Rails to Enter: Yes Hand Rails : Bilateral 
One/Two Story Residence: Two story, live on 1st floor # of Interior Steps: 15 Interior Rails: Right Living Alone: No 
Support Systems: Spouse/Significant Other/Partner Patient Expects to be Discharged to[de-identified] Private residence Current DME Used/Available at Home: Cane, straight, Commode, bedside, Grab bars, Shower chair, Walker, rolling, Walker, rollator Tub or Shower Type: Shower Hand dominance: Right EXAMINATION OF PERFORMANCE DEFICITS: 
Cognitive/Behavioral Status: 
Neurologic State: Alert Orientation Level: Oriented X4 Cognition: Appropriate decision making; Appropriate for age attention/concentration; Appropriate safety awareness; Follows commands Vision/Perceptual:   
 
Acuity: Within Defined Limits Corrective Lenses: Glasses Range of Motion: 
AROM: Within functional limits Strength: 
Strength: Within functional limits Coordination: 
Coordination: Within functional limits Tone & Sensation: 
Tone: Normal 
 Sensation: Intact Balance: 
Sitting: Intact Standing: Intact Functional Mobility and Transfers for ADLs: 
Bed Mobility: 
Patient presented up, but described the use of the log rolling technique to transition to sitting EOB today. Scooting: Independent Transfers: 
Sit to Stand: Modified independent Stand to Sit: Modified independent Bed to Chair: Independent Bathroom Mobility: Independent Toilet Transfer : Independent Shower Transfer: Independent ADL Assessment: 
Feeding: Independent Oral Facial Hygiene/Grooming: Independent Bathing: Modified independent Upper Body Dressing: Independent Lower Body Dressing: Modified independent Toileting: Independent ADL Intervention and task modifications: 
Upper Body Dressing Assistance Dressing Assistance: Independent (to arturo LSO) Lower Body Dressing Assistance Underpants: Modified independent; Compensatory technique training Socks: Modified independent; Compensatory technique training Leg Crossed Method Used: Yes Position Performed: Seated edge of bed;Standing Reviewed post op spine precautions as they relate to bed mobility and ADL performance. Instructed patient to avoid reaching across the midline of her body to prevent trunk rotation during bathing, dressing and the performance of item retrieval during ADLs and meal prep. Patient instructed in log rolling technique for bed mobility and use of leg crossed technique for LB dressing to prevent trunk rotation and flexion. Instructed patient and family to have needed items placed at a level between shoulder and knee height , so they can be easily accessed to promote proper body mechanics and to minimize risk for LOB. Patient able to verbalize and/or demonstrate full understanding upon completion of this education/training. Functional Measure: 
Barthel Index: 
 
Bathin Bladder: 10 Bowels: 10 
Groomin Dressing: 10 Feeding: 10 
 Mobility: 15 
Stairs: 10 Toilet Use: 10 Transfer (Bed to Chair and Back): 15 Total: 100 Barthel and G-code impairment scale: 
Percentage of impairment CH 
0% CI 
1-19% CJ 
20-39% CK 
40-59% CL 
60-79% CM 
80-99% CN 
100% Barthel Score 0-100 100 99-80 79-60 59-40 20-39 1-19 
 0 Barthel Score 0-20 20 17-19 13-16 9-12 5-8 1-4 0 The Barthel ADL Index: Guidelines 1. The index should be used as a record of what a patient does, not as a record of what a patient could do. 2. The main aim is to establish degree of independence from any help, physical or verbal, however minor and for whatever reason. 3. The need for supervision renders the patient not independent. 4. A patient's performance should be established using the best available evidence. Asking the patient, friends/relatives and nurses are the usual sources, but direct observation and common sense are also important. However direct testing is not needed. 5. Usually the patient's performance over the preceding 24-48 hours is important, but occasionally longer periods will be relevant. 6. Middle categories imply that the patient supplies over 50 per cent of the effort. 7. Use of aids to be independent is allowed. Flakito Nicholas., Barthel, DJacobW. (1080). Functional evaluation: the Barthel Index. [address] [facility] (14)2. Tamra Joshi, RICHARD, Jacoby Sullivan., Gabriela Courtney., [place], [address] [place] [address] (1999). Measuring the change indisability after inpatient rehabilitation; comparison of the responsiveness of the Barthel Index and Functional [place] Measure. Journal of Neurology, Neurosurgery, and Psychiatry, 66(4), 157-058. Joan Barrow, N.J.A, DAYANARA Lloyd.JHONNY, & Bessie Ruth MJacobA. (2004.) Assessment of post-stroke quality of life in cost-effectiveness studies: The usefulness of the Barthel Index and the EuroQoL-5D. [facility], 13, 130-75 G codes: In compliance with CMSs Claims Based Outcome Reporting, the following G-code set was chosen for this patient based on their primary functional limitation being treated: The outcome measure chosen to determine the severity of the functional limitation was the Barthel Index with a score of 100/100 which was correlated with the impairment scale. ? Self Care:  
  - CURRENT STATUS: CH - 0% impaired, limited or restricted  - GOAL STATUS: CH - 0% impaired, limited or restricted  - D/C STATUS:  CH - 0% impaired, limited or restricted Pain: 
Pain Scale 1: Numeric (0 - 10) Pain Intensity 1: 9 Pain Location 1: Back Pain Orientation 1: Lower Pain Description 1: Aching Pain Intervention(s) 1: Medication (see MAR) After treatment:  
[x]  Patient left in no apparent distress sitting up in chair 
[]  Patient left in no apparent distress in bed 
[x]  Call bell left within reach [x]  Nursing notified 
[]  Caregiver present 
[]  Bed alarm activated COMMUNICATION/EDUCATION:  
Communication/Collaboration: 
[x]      Home safety education was provided and the patient/caregiver indicated understanding. [x]      Patient/family have participated as able and agree with findings and recommendations. []      Patient is unable to participate in plan of care at this time. Findings and recommendations were discussed with: Physical Therapist and Registered Nurse Juan David Elaine, OTR/L Time Calculation: 21 mins

## 2018-10-16 ENCOUNTER — APPOINTMENT (OUTPATIENT)
Dept: CT IMAGING | Age: 63
End: 2018-10-16
Attending: EMERGENCY MEDICINE
Payer: MEDICARE

## 2018-10-16 ENCOUNTER — HOSPITAL ENCOUNTER (EMERGENCY)
Age: 63
Discharge: HOME | End: 2018-10-16
Attending: EMERGENCY MEDICINE
Payer: MEDICARE

## 2018-10-16 VITALS
HEIGHT: 62 IN | TEMPERATURE: 97.6 F | WEIGHT: 144 LBS | HEART RATE: 102 BPM | OXYGEN SATURATION: 99 % | BODY MASS INDEX: 26.5 KG/M2 | DIASTOLIC BLOOD PRESSURE: 96 MMHG | SYSTOLIC BLOOD PRESSURE: 144 MMHG | RESPIRATION RATE: 21 BRPM

## 2018-10-16 DIAGNOSIS — R47.01 APHASIA: Primary | ICD-10-CM

## 2018-10-16 DIAGNOSIS — R41.0 DISORIENTATION: ICD-10-CM

## 2018-10-16 DIAGNOSIS — N30.00 ACUTE CYSTITIS WITHOUT HEMATURIA: ICD-10-CM

## 2018-10-16 LAB
ALBUMIN SERPL-MCNC: 3.1 G/DL (ref 3.5–5)
ALBUMIN/GLOB SERPL: 0.9 {RATIO} (ref 1.1–2.2)
ALP SERPL-CCNC: 71 U/L (ref 45–117)
ALT SERPL-CCNC: 22 U/L (ref 12–78)
AMPHET UR QL SCN: NEGATIVE
ANION GAP SERPL CALC-SCNC: 9 MMOL/L (ref 5–15)
APAP SERPL-MCNC: <2 UG/ML (ref 10–30)
APPEARANCE UR: CLEAR
AST SERPL-CCNC: 18 U/L (ref 15–37)
BACTERIA URNS QL MICRO: ABNORMAL /HPF
BARBITURATES UR QL SCN: NEGATIVE
BASOPHILS # BLD: 0 K/UL (ref 0–0.1)
BASOPHILS NFR BLD: 0 % (ref 0–1)
BENZODIAZ UR QL: NEGATIVE
BILIRUB SERPL-MCNC: 0.5 MG/DL (ref 0.2–1)
BILIRUB UR QL: NEGATIVE
BUN SERPL-MCNC: 13 MG/DL (ref 6–20)
BUN/CREAT SERPL: 19 (ref 12–20)
CALCIUM SERPL-MCNC: 8.6 MG/DL (ref 8.5–10.1)
CANNABINOIDS UR QL SCN: NEGATIVE
CHLORIDE SERPL-SCNC: 102 MMOL/L (ref 97–108)
CO2 SERPL-SCNC: 24 MMOL/L (ref 21–32)
COCAINE UR QL SCN: NEGATIVE
COLOR UR: ABNORMAL
CREAT SERPL-MCNC: 0.69 MG/DL (ref 0.55–1.02)
DIFFERENTIAL METHOD BLD: NORMAL
DRUG SCRN COMMENT,DRGCM: NORMAL
EOSINOPHIL # BLD: 0.1 K/UL (ref 0–0.4)
EOSINOPHIL NFR BLD: 1 % (ref 0–7)
EPITH CASTS URNS QL MICRO: ABNORMAL /LPF
ERYTHROCYTE [DISTWIDTH] IN BLOOD BY AUTOMATED COUNT: 12.7 % (ref 11.5–14.5)
GLOBULIN SER CALC-MCNC: 3.5 G/DL (ref 2–4)
GLUCOSE SERPL-MCNC: 105 MG/DL (ref 65–100)
GLUCOSE UR STRIP.AUTO-MCNC: NEGATIVE MG/DL
HCT VFR BLD AUTO: 36.6 % (ref 35–47)
HGB BLD-MCNC: 12.3 G/DL (ref 11.5–16)
HGB UR QL STRIP: NEGATIVE
HYALINE CASTS URNS QL MICRO: ABNORMAL /LPF (ref 0–5)
IMM GRANULOCYTES # BLD: 0 K/UL (ref 0–0.04)
IMM GRANULOCYTES NFR BLD AUTO: 0 % (ref 0–0.5)
INR PPP: 1 (ref 0.9–1.1)
KETONES UR QL STRIP.AUTO: 15 MG/DL
LEUKOCYTE ESTERASE UR QL STRIP.AUTO: NEGATIVE
LYMPHOCYTES # BLD: 1.8 K/UL (ref 0.8–3.5)
LYMPHOCYTES NFR BLD: 19 % (ref 12–49)
MCH RBC QN AUTO: 30.8 PG (ref 26–34)
MCHC RBC AUTO-ENTMCNC: 33.6 G/DL (ref 30–36.5)
MCV RBC AUTO: 91.5 FL (ref 80–99)
METHADONE UR QL: NEGATIVE
MONOCYTES # BLD: 0.8 K/UL (ref 0–1)
MONOCYTES NFR BLD: 8 % (ref 5–13)
NEUTS SEG # BLD: 7 K/UL (ref 1.8–8)
NEUTS SEG NFR BLD: 72 % (ref 32–75)
NITRITE UR QL STRIP.AUTO: POSITIVE
NRBC # BLD: 0 K/UL (ref 0–0.01)
NRBC BLD-RTO: 0 PER 100 WBC
OPIATES UR QL: NEGATIVE
PCP UR QL: NEGATIVE
PH UR STRIP: 8 [PH] (ref 5–8)
PLATELET # BLD AUTO: 301 K/UL (ref 150–400)
PMV BLD AUTO: 9.7 FL (ref 8.9–12.9)
POTASSIUM SERPL-SCNC: 3.8 MMOL/L (ref 3.5–5.1)
PROT SERPL-MCNC: 6.6 G/DL (ref 6.4–8.2)
PROT UR STRIP-MCNC: NEGATIVE MG/DL
PROTHROMBIN TIME: 10.3 SEC (ref 9–11.1)
RBC # BLD AUTO: 4 M/UL (ref 3.8–5.2)
RBC #/AREA URNS HPF: ABNORMAL /HPF (ref 0–5)
SALICYLATES SERPL-MCNC: 3.2 MG/DL (ref 2.8–20)
SODIUM SERPL-SCNC: 135 MMOL/L (ref 136–145)
SP GR UR REFRACTOMETRY: 1 (ref 1–1.03)
UA: UC IF INDICATED,UAUC: ABNORMAL
UROBILINOGEN UR QL STRIP.AUTO: 1 EU/DL (ref 0.2–1)
WBC # BLD AUTO: 9.8 K/UL (ref 3.6–11)
WBC URNS QL MICRO: ABNORMAL /HPF (ref 0–4)

## 2018-10-16 PROCEDURE — 99285 EMERGENCY DEPT VISIT HI MDM: CPT

## 2018-10-16 PROCEDURE — 87086 URINE CULTURE/COLONY COUNT: CPT | Performed by: EMERGENCY MEDICINE

## 2018-10-16 PROCEDURE — 87186 SC STD MICRODIL/AGAR DIL: CPT | Performed by: EMERGENCY MEDICINE

## 2018-10-16 PROCEDURE — 74011250636 HC RX REV CODE- 250/636: Performed by: EMERGENCY MEDICINE

## 2018-10-16 PROCEDURE — 80307 DRUG TEST PRSMV CHEM ANLYZR: CPT | Performed by: EMERGENCY MEDICINE

## 2018-10-16 PROCEDURE — 80053 COMPREHEN METABOLIC PANEL: CPT | Performed by: EMERGENCY MEDICINE

## 2018-10-16 PROCEDURE — 70450 CT HEAD/BRAIN W/O DYE: CPT

## 2018-10-16 PROCEDURE — 36415 COLL VENOUS BLD VENIPUNCTURE: CPT | Performed by: EMERGENCY MEDICINE

## 2018-10-16 PROCEDURE — 81001 URINALYSIS AUTO W/SCOPE: CPT | Performed by: EMERGENCY MEDICINE

## 2018-10-16 PROCEDURE — 70496 CT ANGIOGRAPHY HEAD: CPT

## 2018-10-16 PROCEDURE — 96365 THER/PROPH/DIAG IV INF INIT: CPT

## 2018-10-16 PROCEDURE — 87077 CULTURE AEROBIC IDENTIFY: CPT | Performed by: EMERGENCY MEDICINE

## 2018-10-16 PROCEDURE — 74011000258 HC RX REV CODE- 258: Performed by: EMERGENCY MEDICINE

## 2018-10-16 PROCEDURE — 74011636320 HC RX REV CODE- 636/320: Performed by: EMERGENCY MEDICINE

## 2018-10-16 PROCEDURE — 85025 COMPLETE CBC W/AUTO DIFF WBC: CPT | Performed by: EMERGENCY MEDICINE

## 2018-10-16 PROCEDURE — 85610 PROTHROMBIN TIME: CPT | Performed by: EMERGENCY MEDICINE

## 2018-10-16 RX ORDER — CEPHALEXIN 500 MG/1
500 CAPSULE ORAL 4 TIMES DAILY
Qty: 28 CAP | Refills: 0 | Status: SHIPPED | OUTPATIENT
Start: 2018-10-16 | End: 2018-10-23

## 2018-10-16 RX ORDER — SODIUM CHLORIDE 9 MG/ML
50 INJECTION, SOLUTION INTRAVENOUS
Status: CANCELLED | OUTPATIENT
Start: 2018-10-16 | End: 2018-10-16

## 2018-10-16 RX ORDER — CEPHALEXIN 500 MG/1
500 CAPSULE ORAL 4 TIMES DAILY
Qty: 28 CAP | Refills: 0 | Status: SHIPPED | OUTPATIENT
Start: 2018-10-16 | End: 2018-10-16

## 2018-10-16 RX ORDER — SODIUM CHLORIDE 0.9 % (FLUSH) 0.9 %
10 SYRINGE (ML) INJECTION
Status: CANCELLED | OUTPATIENT
Start: 2018-10-16 | End: 2018-10-16

## 2018-10-16 RX ORDER — SODIUM CHLORIDE 9 MG/ML
50 INJECTION, SOLUTION INTRAVENOUS
Status: COMPLETED | OUTPATIENT
Start: 2018-10-16 | End: 2018-10-16

## 2018-10-16 RX ORDER — SODIUM CHLORIDE 0.9 % (FLUSH) 0.9 %
10 SYRINGE (ML) INJECTION
Status: COMPLETED | OUTPATIENT
Start: 2018-10-16 | End: 2018-10-16

## 2018-10-16 RX ADMIN — CEFTRIAXONE 1 G: 1 INJECTION, POWDER, FOR SOLUTION INTRAMUSCULAR; INTRAVENOUS at 21:50

## 2018-10-16 RX ADMIN — IOPAMIDOL 100 ML: 755 INJECTION, SOLUTION INTRAVENOUS at 18:26

## 2018-10-16 RX ADMIN — Medication 10 ML: at 18:26

## 2018-10-16 RX ADMIN — SODIUM CHLORIDE 50 ML/HR: 900 INJECTION, SOLUTION INTRAVENOUS at 18:26

## 2018-10-16 NOTE — ED TRIAGE NOTES
Pt. Presents to ED today for complaints of confusion since she has been home from surgery. Patient had back surgery and has a pain pump attached at this time. Pt. Placed in position of comfort with call bell in reach. Upon arrival patient is alert and oriented x3.

## 2018-10-16 NOTE — ED PROVIDER NOTES
EMERGENCY DEPARTMENT HISTORY AND PHYSICAL EXAM 
 
 
Date: 10/16/2018 Patient Name: Jean Arizmendi History of Presenting Illness Chief Complaint Patient presents with  Altered mental status  
  pt sent over by pcp for confusion. pt had back surgery last week. When asked when her confusion started, \"I don't know\". pt is oriented to self, place and situation but not to time History Provided By: Patient HPI: Jean Arizmendi,  female with PMHx significant for Hypercholesteremia, GERD, fibromyalgia, arthritis, cancer, neuropathy and anxiety, presents ambulatory to the ED with cc of altered mental status beginning today. Pt was seen by her PCP today and they noticed that she was acting more confused than usual. Per triage nurse, she is having a hard time getting her words out. Per friend, pt is usually chatty, however recently she has been stressed due to her surgery and  being gone a lot. She states she was confused and flustered on the phone four days ago, noting that she became ever more confused yesterday. She denies any other alleviating or exacerbating factors. She specifically denies any fever or chills. Chief Complaint: Altered mental status Duration: 1 Days Timing:  N/A Location: None Quality: None Severity: Mild Modifying Factors: Stress Associated Symptoms: denies any other associated signs or symptoms There are no other complaints, changes, or physical findings at this time. PCP: David Presley NP Current Outpatient Prescriptions Medication Sig Dispense Refill  cephALEXin (KEFLEX) 500 mg capsule Take 1 Cap by mouth four (4) times daily for 7 days. 28 Cap 0  
 oxyCODONE IR (ROXICODONE) 5 mg immediate release tablet Take 1-2 Tabs by mouth every four (4) hours as needed. Max Daily Amount: 60 mg. If insurance prior auth./quantity restrictions apply, refer to and look up diagnosis code one prescription. Partial fill as needed is permitted. Please do not contact prescriber. 80 Tab 0  
 senna-docusate (PERICOLACE) 8.6-50 mg per tablet Take 1 Tab by mouth daily. 30 Tab 0  
 polyethylene glycol (MIRALAX) 17 gram packet Take 1 Packet by mouth daily as needed (constipation) for up to 15 days. 15 Packet 0  
 acetaminophen (TYLENOL EXTRA STRENGTH) 500 mg tablet Take 1,000 mg by mouth every six (6) hours as needed for Pain.  tiZANidine (ZANAFLEX) 4 mg capsule Take 6 mg by mouth every six (6) hours as needed.  raNITIdine (ZANTAC) 150 mg tablet Take 150 mg by mouth two (2) times a day. Indications: Heartburn  esomeprazole (NEXIUM) 20 mg capsule Take 20 mg by mouth daily. Indications: Heartburn  diclofenac (VOLTAREN) 1 % gel Apply  to affected area as needed.  GABAPENTIN PO Take 1,200 mg by mouth three (3) times daily.  DULoxetine (CYMBALTA) 60 mg capsule TAKE 2 CAPSULES DAILY (Patient taking differently: TAKES ONE TABLET TWICE PER DAY) 180 Cap 2  cyclobenzaprine (FLEXERIL) 10 mg tablet Take 1 Tab by mouth three (3) times daily as needed for Muscle Spasm(s). 270 Tab 0  
 diphenhydrAMINE (BENADRYL) 50 mg tablet Take 100 mg by mouth two (2) times a day. Indications: ALLERGIC REACTIONS  SIMVASTATIN (ZOCOR PO) Take 20 mg by mouth nightly.  diphenoxylate-atropine (LOMOTIL) 2.5-0.025 mg per tablet Take 1 Tab by mouth four (4) times daily as needed. Past History Past Medical History: 
Past Medical History:  
Diagnosis Date  Arthritis RHEUMATOID AND OSTEOARTHRITIS  
 C. difficile diarrhea 2004  
 as of 10/23/14 pt denies any diarrhea or sx  Cancer ([facility] [address] 1982  
 uterine CA, hysterectomy, no chemo / radiation  Cancer [facility]) 2008 Rt BR; lumpectomy, radiation with chemo pill x6 yrs  Fibromyalgia  GERD (gastroesophageal reflux disease)  Hypercholesteremia  Neuropathy  Osteoarthritis  Other ill-defined conditions(799.89)  GRANULOMA ANEULARE, SKIN DISORDER  
  Psychiatric disorder ANXIETY  Rheumatoid arthritis ([facility] [address]  Seasonal allergies Past Surgical History: 
Past Surgical History:  
Procedure Laterality Date [address] COLECTOMY (1/2 REMOVED; \"WAS IN KNOTS\")  BREAST SURGERY PROCEDURE UNLISTED Right 2/2008 RIGHT BREAST LUMPECTOMY  COLONOSCOPY N/A 5/25/2018 COLONOSCOPY performed by Enma Ferrer MD at [facility] ENDOSCOPY  
 HX CARPAL TUNNEL RELEASE Bilateral   
 HX CERVICAL DISKECTOMY  2/3/14 C4-5   
 HX COLONOSCOPY    
 HX GI    
 ENDOSCOPY (DILATATION/ESOPH)  HX HYSTERECTOMY  1982  
 partial  
 HX KNEE ARTHROSCOPY Left x2  
 HX LUMBAR DISKECTOMY  06/2018  HX MOHS PROCEDURES Right [facility]  2005, 2011 CERVICAL NECKx 3  
 HX OTHER SURGICAL  3/2008  
 hematoma s/p Rt BR lumpectomy [address] T&A Family History: 
Family History Problem Relation Age of Onset  Heart Disease Mother  High Cholesterol Mother  Hypertension Mother  High Cholesterol Father  Heart Disease Father  Obesity Father  Diabetes Father  Cancer Maternal Grandmother   
  colon  Anesth Problems Neg Hx Social History: 
Social History Substance Use Topics  Smoking status: Former Smoker Packs/day: 0.50 Years: 45.00 Quit date: 06/2018  Smokeless tobacco: Never Used  Alcohol use Yes Comment: once a month Allergies: Allergies Allergen Reactions  Aspirin Other (comments) Blacked out. Talked with patient. Can take other NSAIDS like Ibuprofen, Naprosyn Review of Systems Review of Systems Constitutional: Negative for chills and fever. Respiratory: Negative for cough and shortness of breath. Cardiovascular: Negative for chest pain. Gastrointestinal: Negative for constipation, diarrhea, nausea and vomiting. Neurological: Negative for weakness and numbness. Psychiatric/Behavioral: Positive for confusion. All other systems reviewed and are negative. Physical Exam  
Physical Exam  
Constitutional: She is oriented to person, place, and time. She appears well-developed and well-nourished. HENT:  
Head: Normocephalic and atraumatic. Eyes: Conjunctivae and EOM are normal.  
Neck: Normal range of motion. Neck supple. Cardiovascular: Normal rate and regular rhythm. Pulmonary/Chest: Effort normal and breath sounds normal. No respiratory distress. Abdominal: Soft. She exhibits no distension. There is no tenderness. Musculoskeletal: Normal range of motion. Neurological: She is alert and oriented to person, place, and time. Answering questions appropriately. Having a hard time getting words out and slow to respond. Stumbling over words. 5/5 strength bilaterally. CN 2-12 intact. Skin: Skin is warm and dry. CORWIN dressing on back. Clean dry and intact Psychiatric:  
Appears anxious Nursing note and vitals reviewed. Diagnostic Study Results Labs - Recent Results (from the past 12 hour(s)) CBC WITH AUTOMATED DIFF Collection Time: 10/16/18  6:35 PM  
Result Value Ref Range WBC 9.8 3.6 - 11.0 K/uL  
 RBC 4.00 3.80 - 5.20 M/uL  
 HGB 12.3 11.5 - 16.0 g/dL HCT 36.6 35.0 - 47.0 % MCV 91.5 80.0 - 99.0 FL  
 MCH 30.8 26.0 - 34.0 PG  
 MCHC 33.6 30.0 - 36.5 g/dL  
 RDW 12.7 11.5 - 14.5 % PLATELET 308 538 - 828 K/uL MPV 9.7 8.9 - 12.9 FL  
 NRBC 0.0 0  WBC ABSOLUTE NRBC 0.00 0.00 - 0.01 K/uL NEUTROPHILS 72 32 - 75 % LYMPHOCYTES 19 12 - 49 % MONOCYTES 8 5 - 13 % EOSINOPHILS 1 0 - 7 % BASOPHILS 0 0 - 1 % IMMATURE GRANULOCYTES 0 0.0 - 0.5 % ABS. NEUTROPHILS 7.0 1.8 - 8.0 K/UL  
 ABS. LYMPHOCYTES 1.8 0.8 - 3.5 K/UL  
 ABS. MONOCYTES 0.8 0.0 - 1.0 K/UL  
 ABS. EOSINOPHILS 0.1 0.0 - 0.4 K/UL  
 ABS. BASOPHILS 0.0 0.0 - 0.1 K/UL  
 ABS. IMM. GRANS. 0.0 0.00 - 0.04 K/UL  
 DF AUTOMATED METABOLIC PANEL, COMPREHENSIVE Collection Time: 10/16/18  6:35 PM  
Result Value Ref Range Sodium 135 (L) 136 - 145 mmol/L Potassium 3.8 3.5 - 5.1 mmol/L Chloride 102 97 - 108 mmol/L  
 CO2 24 21 - 32 mmol/L Anion gap 9 5 - 15 mmol/L Glucose 105 (H) 65 - 100 mg/dL BUN 13 6 - 20 MG/DL Creatinine 0.69 0.55 - 1.02 MG/DL  
 BUN/Creatinine ratio 19 12 - 20 GFR est AA >60 >60 ml/min/1.73m2 GFR est non-AA >60 >60 ml/min/1.73m2 Calcium 8.6 8.5 - 10.1 MG/DL Bilirubin, total 0.5 0.2 - 1.0 MG/DL  
 ALT (SGPT) 22 12 - 78 U/L  
 AST (SGOT) 18 15 - 37 U/L Alk. phosphatase 71 45 - 117 U/L Protein, total 6.6 6.4 - 8.2 g/dL Albumin 3.1 (L) 3.5 - 5.0 g/dL Globulin 3.5 2.0 - 4.0 g/dL A-G Ratio 0.9 (L) 1.1 - 2.2 PROTHROMBIN TIME + INR Collection Time: 10/16/18  6:35 PM  
Result Value Ref Range INR 1.0 0.9 - 1.1 Prothrombin time 10.3 9.0 - 11.1 sec ACETAMINOPHEN Collection Time: 10/16/18  6:35 PM  
Result Value Ref Range Acetaminophen level <2 (L) 10 - 30 ug/mL SALICYLATE Collection Time: 10/16/18  6:35 PM  
Result Value Ref Range Salicylate level 3.2 2.8 - 20.0 MG/DL URINALYSIS W/ REFLEX CULTURE Collection Time: 10/16/18  7:58 PM  
Result Value Ref Range Color YELLOW/STRAW Appearance CLEAR CLEAR Specific gravity 1.005 1.003 - 1.030    
 pH (UA) 8.0 5.0 - 8.0 Protein NEGATIVE  NEG mg/dL Glucose NEGATIVE  NEG mg/dL Ketone 15 (A) NEG mg/dL Bilirubin NEGATIVE  NEG Blood NEGATIVE  NEG Urobilinogen 1.0 0.2 - 1.0 EU/dL Nitrites POSITIVE (A) NEG Leukocyte Esterase NEGATIVE  NEG    
 UA:UC IF INDICATED URINE CULTURE ORDERED (A) CNI    
 WBC 0-4 0 - 4 /hpf  
 RBC 0-5 0 - 5 /hpf Epithelial cells FEW FEW /lpf Bacteria 4+ (A) NEG /hpf Hyaline cast 0-2 0 - 5 /lpf DRUG SCREEN, URINE Collection Time: 10/16/18  7:58 PM  
Result Value Ref Range  AMPHETAMINES NEGATIVE  NEG    
 BARBITURATES NEGATIVE  NEG BENZODIAZEPINES NEGATIVE  NEG    
 COCAINE NEGATIVE  NEG METHADONE NEGATIVE  NEG    
 OPIATES NEGATIVE  NEG    
 PCP(PHENCYCLIDINE) NEGATIVE  NEG    
 THC (TH-CANNABINOL) NEGATIVE  NEG Drug screen comment (NOTE) Radiologic Studies -  
CTA CODE NEURO HEAD AND NECK W CONT Final Result CT CODE NEURO HEAD WO CONTRAST Final Result CT Results  (Last 48 hours) 10/16/18 1825  CTA CODE NEURO HEAD AND NECK W CONT Final result Impression:  IMPRESSION:  
Negative CT angiography of the neck and head. Narrative:  EXAM:  CTA CODE NEURO HEAD AND NECK W CONT INDICATION:  aphasia COMPARISON:  CT head of 10/16/2018 TECHNIQUE:    
Multislice helical axial CT angiography was performed from the aortic arch to  
the top of the head during uneventful rapid bolus intravenous administration of  
100 mL of Isovue 370. Postprocessing was performed to include MIP and volume  
rendered images. Standard images of the head were also obtained following  
contrast administration and a delay. . CT dose reduction was achieved through  
the use of a standardized protocol tailored for this examination and automatic  
exposure control for dose modulation. CT HEAD The visualized portion of the brain demonstrates no focal lesions and no areas  
of abnormal enhancement. CTA NECK There is conventional three vessel arch anatomy. The bilateral subclavian and  
common carotid  are patent with no flow-limiting stenosis. The internal carotid  
arteries are patent bilaterally. There is no significant stenosis at the carotid  
bifurcations. NASCET method was utilized for calculating stenosis. The  
vertebral arteries are bilaterally patent and the left is dominant. The  
cervical soft tissues are unremarkable. There has been prior ACDF at C4-5, C5-6, and C6-7. There appears to be solid bony fusion at the lower 2 levels but only a small strut anteriorly at C4-5. CTA HEAD The distal vertebral arteries are bilaterally patent and the left is slightly  
dominant. . The basilar artery and its branches demonstrate no significant  
abnormalities. . The internal carotid, anterior cerebral, and middle cerebral  
arteries are patent. There is no flow-limiting intracranial stenosis. No  
aneurysms are demonstrated. . There is a small right posterior communicating  
artery. 10/16/18 1805  CT CODE NEURO HEAD WO CONTRAST Final result Impression:  IMPRESSION:  No significant abnormalities. Narrative:  EXAMINATION:  CT CODE NEURO HEAD WO CONTRAST CLINICAL INFORMATION:  ams, aphasia COMPARISON:  10/21/2014 TECHNIQUE: Routine axial head CT was performed. IV contrast was not  
administered. Sagittal and coronal reconstructions were generated. CT dose reduction was achieved through use of a standardized protocol tailored  
for this examination and automatic exposure control for dose modulation. FINDINGS:  
No acute infarct, hemorrhage or mass. VENTRICULAR SYSTEM:  Normal for age. BASAL CISTERNS:  Patent. BRAIN PARENCHYMA:  No significant abnormalities. MIDLINE SHIFT:  None. CALVARIUM/ SKULL BASE: Intact. PARANASAL SINUSES AND MASTOID AIR CELLS: Clear. VISUALIZED ORBITS: No significant abnormalities. SELLA: No enlargement. Medical Decision Making I am the first provider for this patient. I reviewed the vital signs, available nursing notes, past medical history, past surgical history, family history and social history. Vital Signs-Reviewed the patient's vital signs. Patient Vitals for the past 12 hrs: 
 Temp Pulse Resp BP SpO2  
10/16/18 2000 - (!) 105 22 143/90 99 % 10/16/18 1930 - 99 18 (!) 146/93 100 % 10/16/18 1900 - 99 17 128/62 98 % 10/16/18 1744 97.6 °F (36.4 °C) (!) 102 16 131/89 100 % Records Reviewed: Nursing Notes and Old Medical Records Provider Notes (Medical Decision Making):  
Patient presents with stroke like symptoms including difficulty getting words out/aphasia but no weakness and seen immediately by me on arrival. Spoke with pt regarding symptoms, Time of onset which is unknown, vitals DDx: stress related, psychogenic, ischemic vs. Hemorrhagic stroke, complex migraine, mady's paralysis, UTI, electrolyte anomaly. Given the history and physical, will get a CT head and Neurology consult to determine if tPA warranted. ED Course:  
Initial assessment performed. The patients presenting problems have been discussed, and they are in agreement with the care plan formulated and outlined with them. I have encouraged them to ask questions as they arise throughout their visit. Aaliyah Greenberg 1955 Arrival time to ED: 5:34 PM 
 
Code S Called: 5:49 PM 
 
Physician at Bedside: 5:47 PM 
  
CT Order Time: 7:30 PM 
 
ACT Page: 5:50 PM 
 
ACT Call Back: 5:59 PM 
 
Cancel Code S: 9:15 PM 
 
CONSULT NOTE:  
5:59 PM 
Chelle Lynch MD spoke with Dr. Pankaj Gardner, Specialty: Teloneurology Discussed pt's hx, disposition, and available diagnostic and imaging results. Reviewed care plans. Consultant agrees with plans as outlined. She agrees with CTA and ked to call back with results. Written by Mike Taylor ED Scribe, as dictated by Chelle Lynch MD. 
 
CONSULT NOTE:  
7:31 PM 
Chelle Lynch MD spoke with Thurl Schaumann, MD, Specialty: Hospitalist 
Discussed pt's hx, disposition, and available diagnostic and imaging results. Reviewed care plans. Consultant agrees with plans as outlined. He states he does not think it is a stroke given the recent back surgery. He states it is most likely related to pain medication side affect after post op. He recommends to consult with Nicholas MONTE and Dr. Pavel Suarez with Orthopedics.   
Written by Mike Taylor ED Scribe, as dictated by Chelle Lynch MD.  
 
CONSULT NOTE:  
7:42 PM 
 Cristian Duffy MD spoke with Gerri Muñoz, Specialty: Orthopedics Discussed pt's hx, disposition, and available diagnostic and imaging results. Reviewed care plans. Consultant agrees with plans as outlined. He asks a urine drug screen and he will follow along with hospitalist admitting. Written by Jael Quiles, ED Scribe, as dictated by Cristian Duffy MD.  
 
PROGRESS NOTE:  
8:05 PM 
Pt has been re-examined by Cristian Duffy MD. Pt is carrying a conversation as normal again, no longer difficulty getting words out. She notes she has only taken 4 oxy's in the past 5 days. CONSULT NOTE:  
9:15 PM 
Cristian Duffy MD spoke with Bereket Cowan MD, Specialty: Hospitalist 
Discussed pt's hx, disposition, and available diagnostic and imaging results. Reviewed care plans. Consultant agrees with plans as outlined. Evaluated the pt and states she has a UTI and unlikely stroke/TIA. Written by Jael Quiles, ED Scribe, as dictated by Cristian Duffy MD. Disposition: 
DISCHARGE NOTE 
9:15 PM 
The patient has been re-evaluated and is ready for discharge. Reviewed available results with patient and family. Counseled pt and family on diagnosis and care plan. Pt and family have expressed understanding, and all questions have been answered. Pt and family agree with plan and agree to F/U as recommended, or return to the ED if their sxs worsen. Discharge instructions have been provided and explained to the pt and their family, along with reasons to return to the ED. Written by Jael Quiles, ED Scribe, as dictated by Cristian Duffy MD. 
PLAN: 
1. Current Discharge Medication List  
  
START taking these medications Details  
cephALEXin (KEFLEX) 500 mg capsule Take 1 Cap by mouth four (4) times daily for 7 days. Qty: 28 Cap, Refills: 0  
  
  
 
2. Follow-up Information Follow up With Details Comments Contact Info  Yesi Medrano NP Schedule an appointment as soon as possible for a visit  [address] 
[phone] Return to ED if worse Diagnosis Clinical Impression: 1. Aphasia 2. Disorientation 3. Acute cystitis without hematuria Attestations: This note is prepared by Alejandra Wen, acting as Scribe for Irene Aguero MD. Irene Aguero MD: The scribe's documentation has been prepared under my direction and personally reviewed by me in its entirety. I confirm that the note above accurately reflects all work, treatment, procedures, and medical decision making performed by me. This note will not be viewable in [address]

## 2018-10-17 NOTE — CONSULTS
Hospitalist Consult Note    NAME: Ana Hernandez   :  1955   MRN:  [id number]     Date/Time:  10/16/2018 8:32 PM    Patient PCP: Addie Rivas NP    I have been asked to see this patient by the attending, Dr. Hugh Zee , for advice/opinion re: AMS. My advice is:  ______________________________________________________________________   Assessment/Plan:  Acute encephalopathy suspicious for UTI in origin  Polypharmacy  Recent s/p L4-5 DECOMPRESSION AND FUSION    -suspect the UTI is the driving factor for her AMS and feelings of change in temperature.   -patient is on oxycodone IR, flexeril, zanaflex, benadryl, neurontin but claims no use of recent other than benadryl therefore polypharmacy is not necessarily the driving factor but easily could be. I did not have the medications present in the room and therefore could not pill count to ensure she has not taken an excess amount of pain medication to be honest however  -give rocephin in ED now IV and send home on keflex. -CTA head and neck and story do not lend to dx of TIA or CVA. Patient risk factors for this only are her occasional tobacco and Hypercholesterolemia. Discussed with Dr Hugh Zee whom agrees - no further pursuit of TIA At this time in light of the UTI as the more apropos dx   -encouraged not to continue the multiple sedating medications and informed her the flexeril and benadryl likely could be dc at [facility]ist [facility] but to follow with PCP  -orthopedics to follow OP in short term after this early ED visit in follow up  -other possible eetiology could be psychiatric flare of her underlying disorder but will follow OP for improvement or worsening of this condition    **safe for dc to home at this time. Please call with questions. Subjective:   CHIEF COMPLAINT:  \"i feel fine now\"    HISTORY OF PRESENT ILLNESS:     Vivi Godoy is a 63 y.o.  female whom presents with complaint noted above.  Patient with recent L4-5 decompression and fusion by Dr Martha Smalls 6 days ago dc to home. She was doing \"well\" until today when she was having difficulty with altered mentation, getting words out, but no real word finding difficulty. She was having trouble feeling hot and cold, but no obvious chills. No sick contacts. No recent travel. No change to her wounds. No streaking/redness. No dysuria. In ED work up for her word finding took precedence and CTA head/neck was negative for findings of cva. She woke up in the ED and was her normal self with no interventions. She was found to have UTI not present prior to surgery. We were asked to see and render opinion on her AMS.     Past Medical History:   Diagnosis Date    Arthritis     RHEUMATOID AND OSTEOARTHRITIS    C. difficile diarrhea 2004    as of 10/23/14 pt denies any diarrhea or sx    Cancer ([facility] [address] 1982    uterine CA, hysterectomy, no chemo / radiation    Cancer ([address] 2008    Rt BR; lumpectomy, radiation with chemo pill x6 yrs    Fibromyalgia     GERD (gastroesophageal reflux disease)     Hypercholesteremia     Neuropathy     Osteoarthritis     Other ill-defined conditions(799.89)     GRANULOMA ANEULARE, SKIN DISORDER    Psychiatric disorder     ANXIETY    Rheumatoid arthritis ([address]     Seasonal allergies       Past Surgical History:   Procedure Laterality Date    ABDOMEN SURGERY PROC UNLISTED  1998    COLECTOMY (1/2 REMOVED; \"WAS IN KNOTS\")    BREAST SURGERY PROCEDURE UNLISTED Right 2/2008    RIGHT BREAST LUMPECTOMY    COLONOSCOPY N/A 5/25/2018    COLONOSCOPY performed by Barry Corbett MD at \[facility]\"" ENDOSCOPY    HX CARPAL TUNNEL RELEASE Bilateral     HX CERVICAL DISKECTOMY  2/3/14    C4-5     HX COLONOSCOPY      HX GI      ENDOSCOPY (DILATATION/ESOPH)    HX HYSTERECTOMY  1982    partial    HX KNEE ARTHROSCOPY Left     x2    HX LUMBAR DISKECTOMY  06/2018    HX MOHS PROCEDURES Right     HX ORTHOPAEDIC  2005, 2011    CERVICAL NECKx 3    HX OTHER SURGICAL  3/2008    hematoma s/p Rt BR lumpectomy    HX TONSILLECTOMY  1963    T&A     Social History   Substance Use Topics    Smoking status: Former Smoker     Packs/day: 0.50     Years: 45.00     Quit date: 06/2018    Smokeless tobacco: Never Used    Alcohol use Yes      Comment: once a month      Family History   Problem Relation Age of Onset    Heart Disease Mother     High Cholesterol Mother     Hypertension Mother     High Cholesterol Father     Heart Disease Father     Obesity Father     Diabetes Father     Cancer Maternal Grandmother      colon    Anesth Problems Neg Hx       Allergies   Allergen Reactions    Aspirin Other (comments)     Blacked out. Talked with patient. Can take other NSAIDS like Ibuprofen, Naprosyn        Prior to Admission medications    Medication Sig Start Date End Date Taking? Authorizing Provider   oxyCODONE IR (ROXICODONE) 5 mg immediate release tablet Take 1-2 Tabs by mouth every four (4) hours as needed. Max Daily Amount: 60 mg. If insurance prior auth./quantity restrictions apply, refer to and look up diagnosis code one prescription. Partial fill as needed is permitted. Please do not contact prescriber. 10/11/18   Ellie Carrera NP   senna-docusate (PERICOLACE) 8.6-50 mg per tablet Take 1 Tab by mouth daily. 10/11/18   Ellie Carrera NP   polyethylene glycol (MIRALAX) 17 gram packet Take 1 Packet by mouth daily as needed (constipation) for up to 15 days. 10/11/18 10/26/18  Ellie Carrera NP   acetaminophen (TYLENOL EXTRA STRENGTH) 500 mg tablet Take 1,000 mg by mouth every six (6) hours as needed for Pain. Historical Provider   tiZANidine (ZANAFLEX) 4 mg capsule Take 6 mg by mouth every six (6) hours as needed. Historical Provider   raNITIdine (ZANTAC) 150 mg tablet Take 150 mg by mouth two (2) times a day. Indications: Heartburn    Historical Provider   esomeprazole (NEXIUM) 20 mg capsule Take 20 mg by mouth daily.  Indications: Heartburn    Historical Provider   diclofenac (VOLTAREN) 1 % gel Apply  to affected area as needed. Historical Provider   GABAPENTIN PO Take 1,200 mg by mouth three (3) times daily. Historical Provider   DULoxetine (CYMBALTA) 60 mg capsule TAKE 2 CAPSULES DAILY  Patient taking differently: TAKES ONE TABLET TWICE PER DAY 1/30/17   Norma Pitts MD   cyclobenzaprine (FLEXERIL) 10 mg tablet Take 1 Tab by mouth three (3) times daily as needed for Muscle Spasm(s). 1/20/16   Norma Pitts MD   diphenhydrAMINE (BENADRYL) 50 mg tablet Take 100 mg by mouth two (2) times a day. Indications: ALLERGIC REACTIONS    Historical Provider   SIMVASTATIN (ZOCOR PO) Take 20 mg by mouth nightly. Bertha Miller MD   diphenoxylate-atropine (LOMOTIL) 2.5-0.025 mg per tablet Take 1 Tab by mouth four (4) times daily as needed. Bertha Miller MD     No current facility-administered medications for this encounter. Current Outpatient Prescriptions   Medication Sig    oxyCODONE IR (ROXICODONE) 5 mg immediate release tablet Take 1-2 Tabs by mouth every four (4) hours as needed. Max Daily Amount: 60 mg. If insurance prior auth./quantity restrictions apply, refer to and look up diagnosis code one prescription. Partial fill as needed is permitted. Please do not contact prescriber.  senna-docusate (PERICOLACE) 8.6-50 mg per tablet Take 1 Tab by mouth daily.  polyethylene glycol (MIRALAX) 17 gram packet Take 1 Packet by mouth daily as needed (constipation) for up to 15 days.  acetaminophen (TYLENOL EXTRA STRENGTH) 500 mg tablet Take 1,000 mg by mouth every six (6) hours as needed for Pain.  tiZANidine (ZANAFLEX) 4 mg capsule Take 6 mg by mouth every six (6) hours as needed.  raNITIdine (ZANTAC) 150 mg tablet Take 150 mg by mouth two (2) times a day. Indications: Heartburn    esomeprazole (NEXIUM) 20 mg capsule Take 20 mg by mouth daily. Indications: Heartburn    diclofenac (VOLTAREN) 1 % gel Apply  to affected area as needed.  GABAPENTIN PO Take 1,200 mg by mouth three (3) times daily.  DULoxetine (CYMBALTA) 60 mg capsule TAKE 2 CAPSULES DAILY (Patient taking differently: TAKES ONE TABLET TWICE PER DAY)    cyclobenzaprine (FLEXERIL) 10 mg tablet Take 1 Tab by mouth three (3) times daily as needed for Muscle Spasm(s).  diphenhydrAMINE (BENADRYL) 50 mg tablet Take 100 mg by mouth two (2) times a day. Indications: ALLERGIC REACTIONS    SIMVASTATIN (ZOCOR PO) Take 20 mg by mouth nightly.  diphenoxylate-atropine (LOMOTIL) 2.5-0.025 mg per tablet Take 1 Tab by mouth four (4) times daily as needed.       REVIEW OF SYSTEMS:    General: negative for fever, chills, sweats, weakness, weight loss  Eyes: negative for blurred vision, eye pain, loss of vision, diplopia  Ear Nose and Throat: negative for rhinorrhea, pharyngitis, otalgia, tinnitus, speech or swallowing difficulties  Respiratory:  negative for cough, sputum production, SOB, wheezing, MOORE, pleuritic pain  Cardiology:  negative for chest pain, palpitations, orthopnea, PND, edema, syncope   Gastrointestinal: negative for abdominal pain, N/V, dysphagia, change in bowel habits, bleeding  Genitourinary: negative for frequency, urgency, dysuria, hematuria, incontinence  Muskuloskeletal : negative for arthralgia, myalgia  Hematology: negative for easy bruising, bleeding, lymphadenopathy  Dermatological: negative for rash, ulceration, mole change, new lesion  Endocrine: negative for hot flashes or polydipsia  Neurological: negative for headache, dizziness, confusion, focal weakness, paresthesia, memory loss, gait disturbance  Psychological: negative for anxiety, depression, agitation    Objective:   VITALS:    Visit Vitals    /89 (BP 1 Location: Right arm, BP Patient Position: At rest)    Pulse (!) 102    Temp 97.6 °F (36.4 °C)    Resp 16    Ht 5' 2\" (1.575 m)    Wt 65.3 kg (144 lb)    SpO2 100%    BMI 26.34 kg/m2     PHYSICAL EXAM:     GENERAL:    WD y   WN y   Cachectic    Thin n   Obese n   Disheveled y   Ill Appearing Critically n   Ill Appearing Chronically y   Acute Distress n   Other      HEENT:    NC/AT/EOMI y   PERRLA y   Conjunctivae Pink    Conjunctivae Pale y   Moist Mucosa    Dry Mucosa y   Hearing intact to voice y   Other      NECK:    Supple y   Masses n   Thyroid Tender n   Other                   RESPIRATORY:    CTA bilaterally WITHOUT wheezing/rhonchi/rales or crackles y   Wheezing    Rhonchi    Crackles    Use of accessory muscles n   Other      CARDIAC:    regular rate and rhythm No murmurs/rubs/gallops y   Murmur    Rubs    Gallops    Rate Regular/Irregular reg   Carotid Bruit Left/Right n   Lower Extremity Edema n   JVP     Other      ABDOMEN:    soft/non distended non tender +bowel sounds no HSM y   Rigid    Tenderness    Hepatomegaly    Splenomegaly    Distended    Normal/Hyper/Hypo Active Bowel Sounds nml   Other      SKIN / MUSCULOSKELETAL:    Rashes n   Ecchymosis n   Ulcers    Tight to palpitation    Turgor Good/Poor g   Cyanosis/Clubbing n   Amputation(s) n   Other      NEUROLOGY:    cranial nerves II-XII grossly intact y   Cranial Nerve Deficit    Facial Droop n   Slurred Speech n   Aphasia    Strength Normal y   Weakness n   Meningismus/Kernig's Sign/ Brudzinsky n   Follows Commands y   Other      PSYCHIATRIC:    AAOx3 in no acute distress y   Insight Poor    Insight Good y   Alert and Oriented to Person     Alert and Oriented to Place    Alert and Oriented toTime    Depressed    Anxious    Agitated n   Lethargic n   Stuporous n   Sedated    Other      ________________________________________________________________________  Care Plan discussed with:    Comments   Patient y Discussed POC with patient.  Questions answered 21   Family      RN y    Care Manager                    Consultant:  kevin HORN MD 10   ________________________________________________________________________  TOTAL TIME:  50    Comments   >50% of visit spent in counseling and coordination of care x See above     Critical Care Provided     Minutes non procedure based  ________________________________________________________________________  Signed: Francisca Salter MD    This note will not be viewable in [address] Procedures: see electronic medical records for all procedures/Xrays and details which were not copied into this note but were reviewed prior to creation of Plan. LAB DATA REVIEWED:    Recent Results (from the past 24 hour(s))   CBC WITH AUTOMATED DIFF    Collection Time: 10/16/18  6:35 PM   Result Value Ref Range    WBC 9.8 3.6 - 11.0 K/uL    RBC 4.00 3.80 - 5.20 M/uL    HGB 12.3 11.5 - 16.0 g/dL    HCT 36.6 35.0 - 47.0 %    MCV 91.5 80.0 - 99.0 FL    MCH 30.8 26.0 - 34.0 PG    MCHC 33.6 30.0 - 36.5 g/dL    RDW 12.7 11.5 - 14.5 %    PLATELET 433 420 - 934 K/uL    MPV 9.7 8.9 - 12.9 FL    NRBC 0.0 0  WBC    ABSOLUTE NRBC 0.00 0.00 - 0.01 K/uL    NEUTROPHILS 72 32 - 75 %    LYMPHOCYTES 19 12 - 49 %    MONOCYTES 8 5 - 13 %    EOSINOPHILS 1 0 - 7 %    BASOPHILS 0 0 - 1 %    IMMATURE GRANULOCYTES 0 0.0 - 0.5 %    ABS. NEUTROPHILS 7.0 1.8 - 8.0 K/UL    ABS. LYMPHOCYTES 1.8 0.8 - 3.5 K/UL    ABS. MONOCYTES 0.8 0.0 - 1.0 K/UL    ABS. EOSINOPHILS 0.1 0.0 - 0.4 K/UL    ABS. BASOPHILS 0.0 0.0 - 0.1 K/UL    ABS. IMM. GRANS. 0.0 0.00 - 0.04 K/UL    DF AUTOMATED     METABOLIC PANEL, COMPREHENSIVE    Collection Time: 10/16/18  6:35 PM   Result Value Ref Range    Sodium 135 (L) 136 - 145 mmol/L    Potassium 3.8 3.5 - 5.1 mmol/L    Chloride 102 97 - 108 mmol/L    CO2 24 21 - 32 mmol/L    Anion gap 9 5 - 15 mmol/L    Glucose 105 (H) 65 - 100 mg/dL    BUN 13 6 - 20 MG/DL    Creatinine 0.69 0.55 - 1.02 MG/DL    BUN/Creatinine ratio 19 12 - 20      GFR est AA >60 >60 ml/min/1.73m2    GFR est non-AA >60 >60 ml/min/1.73m2    Calcium 8.6 8.5 - 10.1 MG/DL    Bilirubin, total 0.5 0.2 - 1.0 MG/DL    ALT (SGPT) 22 12 - 78 U/L    AST (SGOT) 18 15 - 37 U/L    Alk.  phosphatase 71 45 - 117 U/L    Protein, total 6.6 6.4 - 8.2 g/dL    Albumin 3.1 (L) 3.5 - 5.0 g/dL Globulin 3.5 2.0 - 4.0 g/dL    A-G Ratio 0.9 (L) 1.1 - 2.2     PROTHROMBIN TIME + INR    Collection Time: 10/16/18  6:35 PM   Result Value Ref Range    INR 1.0 0.9 - 1.1      Prothrombin time 10.3 9.0 - 11.1 sec   ACETAMINOPHEN    Collection Time: 10/16/18  6:35 PM   Result Value Ref Range    Acetaminophen level <2 (L) 10 - 30 ug/mL   SALICYLATE    Collection Time: 10/16/18  6:35 PM   Result Value Ref Range    Salicylate level 3.2 2.8 - 20.0 MG/DL   URINALYSIS W/ REFLEX CULTURE    Collection Time: 10/16/18  7:58 PM   Result Value Ref Range    Color YELLOW/STRAW      Appearance CLEAR CLEAR      Specific gravity 1.005 1.003 - 1.030      pH (UA) 8.0 5.0 - 8.0      Protein NEGATIVE  NEG mg/dL    Glucose NEGATIVE  NEG mg/dL    Ketone 15 (A) NEG mg/dL    Bilirubin NEGATIVE  NEG      Blood NEGATIVE  NEG      Urobilinogen 1.0 0.2 - 1.0 EU/dL    Nitrites POSITIVE (A) NEG      Leukocyte Esterase NEGATIVE  NEG      UA:UC IF INDICATED URINE CULTURE ORDERED (A) CNI      WBC 0-4 0 - 4 /hpf    RBC 0-5 0 - 5 /hpf    Epithelial cells FEW FEW /lpf    Bacteria 4+ (A) NEG /hpf    Hyaline cast 0-2 0 - 5 /lpf

## 2018-10-17 NOTE — DISCHARGE INSTRUCTIONS
Urinary Tract Infection in Women: Care Instructions  Your Care Instructions    A urinary tract infection, or UTI, is a general term for an infection anywhere between the kidneys and the urethra (where urine comes out). Most UTIs are bladder infections. They often cause pain or burning when you urinate. UTIs are caused by bacteria and can be cured with antibiotics. Be sure to complete your treatment so that the infection goes away. Follow-up care is a key part of your treatment and safety. Be sure to make and go to all appointments, and call your doctor if you are having problems. It's also a good idea to know your test results and keep a list of the medicines you take. How can you care for yourself at home? · Take your antibiotics as directed. Do not stop taking them just because you feel better. You need to take the full course of antibiotics. · Drink extra water and other fluids for the next day or two. This may help wash out the bacteria that are causing the infection. (If you have kidney, heart, or liver disease and have to limit fluids, talk with your doctor before you increase your fluid intake.)  · Avoid drinks that are carbonated or have caffeine. They can irritate the bladder. · Urinate often. Try to empty your bladder each time. · To relieve pain, take a hot bath or lay a heating pad set on low over your lower belly or genital area. Never go to sleep with a heating pad in place. To prevent UTIs  · Drink plenty of water each day. This helps you urinate often, which clears bacteria from your system. (If you have kidney, heart, or liver disease and have to limit fluids, talk with your doctor before you increase your fluid intake.)  · Urinate when you need to. · Urinate right after you have sex. · Change sanitary pads often. · Avoid douches, bubble baths, feminine hygiene sprays, and other feminine hygiene products that have deodorants.   · After going to the bathroom, wipe from front to back.  When should you call for help? Call your doctor now or seek immediate medical care if:    · Symptoms such as fever, chills, nausea, or vomiting get worse or appear for the first time.     · You have new pain in your back just below your rib cage. This is called flank pain.     · There is new blood or pus in your urine.     · You have any problems with your antibiotic medicine.    Watch closely for changes in your health, and be sure to contact your doctor if:    · You are not getting better after taking an antibiotic for 2 days.     · Your symptoms go away but then come back. Where can you learn more? Go to http://thomas-roque.info/. Enter X874 in the search box to learn more about \"Urinary Tract Infection in Women: Care Instructions. \"  Current as of: March 21, 2018  Content Version: 11.8  © 7604-7563 [employer]. Care instructions adapted under license by [employer] (which disclaims liability or warranty for this information). If you have questions about a medical condition or this instruction, always ask your healthcare professional. [address] any warranty or liability for your use of this information.

## 2018-10-18 LAB
BACTERIA SPEC CULT: ABNORMAL
CC UR VC: ABNORMAL
SERVICE CMNT-IMP: ABNORMAL

## 2018-10-19 RX ORDER — CIPROFLOXACIN 500 MG/1
500 TABLET ORAL 2 TIMES DAILY
Qty: 6 TAB | Refills: 0 | Status: SHIPPED | OUTPATIENT
Start: 2018-10-19 | End: 2018-10-22

## 2018-10-19 NOTE — PROGRESS NOTES
Contacted patient, reviewed urine culture results. 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 Pt is concerned because she is prone to C diff. Pt has done well with Cipro in the past and GFR is >60, thus will tx with 3 days of Cipro 500mg BID and strongly encouraged follow up with PCP for recollection of urine to ensure resolution. She asked if Rx could be sent to [facility] in [facility].

## 2019-08-28 ENCOUNTER — HOSPITAL ENCOUNTER (OUTPATIENT)
Dept: MAMMOGRAPHY | Age: 64
Discharge: HOME | End: 2019-08-28
Attending: NURSE PRACTITIONER
Payer: MEDICARE

## 2019-08-28 DIAGNOSIS — Z12.39 BREAST SCREENING: ICD-10-CM

## 2019-08-28 PROCEDURE — 77067 SCR MAMMO BI INCL CAD: CPT

## 2019-10-05 ENCOUNTER — HOSPITAL ENCOUNTER (OUTPATIENT)
Dept: MRI IMAGING | Age: 64
Discharge: HOME | End: 2019-10-05
Attending: PHYSICAL MEDICINE & REHABILITATION
Payer: MEDICARE

## 2019-10-05 DIAGNOSIS — Z98.1 S/P LUMBAR SPINAL FUSION: ICD-10-CM

## 2019-10-05 DIAGNOSIS — M51.37 DDD (DEGENERATIVE DISC DISEASE), LUMBOSACRAL: ICD-10-CM

## 2019-10-05 PROCEDURE — 72148 MRI LUMBAR SPINE W/O DYE: CPT

## 2019-11-26 ENCOUNTER — HOSPITAL ENCOUNTER (OUTPATIENT)
Dept: MRI IMAGING | Age: 64
Discharge: HOME | End: 2019-11-26
Attending: PHYSICAL MEDICINE & REHABILITATION
Payer: MEDICARE

## 2019-11-26 DIAGNOSIS — Z98.1 S/P SPINAL FUSION: ICD-10-CM

## 2019-11-26 DIAGNOSIS — M54.2 CERVICALGIA: ICD-10-CM

## 2019-11-26 PROCEDURE — 72141 MRI NECK SPINE W/O DYE: CPT

## 2020-10-01 ENCOUNTER — TRANSCRIBE ORDER (OUTPATIENT)
Dept: SCHEDULING | Age: 65
End: 2020-10-01

## 2020-10-01 DIAGNOSIS — Z12.31 VISIT FOR SCREENING MAMMOGRAM: Primary | ICD-10-CM

## 2020-10-06 ENCOUNTER — HOSPITAL ENCOUNTER (OUTPATIENT)
Dept: MAMMOGRAPHY | Age: 65
Discharge: HOME | End: 2020-10-06
Attending: NURSE PRACTITIONER
Payer: MEDICARE

## 2020-10-06 DIAGNOSIS — N63.11 MASS OF UPPER OUTER QUADRANT OF RIGHT BREAST: ICD-10-CM

## 2020-10-06 DIAGNOSIS — Z12.31 VISIT FOR SCREENING MAMMOGRAM: ICD-10-CM

## 2020-10-06 PROCEDURE — 76642 ULTRASOUND BREAST LIMITED: CPT

## 2020-10-06 PROCEDURE — 77062 BREAST TOMOSYNTHESIS BI: CPT

## 2021-01-11 ENCOUNTER — TRANSCRIBE ORDER (OUTPATIENT)
Dept: SCHEDULING | Age: 66
End: 2021-01-11

## 2021-01-11 DIAGNOSIS — M54.32 SCIATICA OF LEFT SIDE: ICD-10-CM

## 2021-01-11 DIAGNOSIS — M54.50 LOW BACK PAIN, UNSPECIFIED BACK PAIN LATERALITY, UNSPECIFIED CHRONICITY, UNSPECIFIED WHETHER SCIATICA PRESENT: Primary | ICD-10-CM

## 2021-01-11 DIAGNOSIS — M51.36 DDD (DEGENERATIVE DISC DISEASE), LUMBAR: ICD-10-CM

## 2021-01-11 DIAGNOSIS — Z98.1 S/P SPINAL FUSION: ICD-10-CM

## 2021-01-11 DIAGNOSIS — M47.817 LUMBOSACRAL SPONDYLOSIS WITHOUT MYELOPATHY: ICD-10-CM

## 2021-01-20 ENCOUNTER — TRANSCRIBE ORDER (OUTPATIENT)
Dept: SCHEDULING | Age: 66
End: 2021-01-20

## 2021-01-20 DIAGNOSIS — M54.42 CHRONIC BILATERAL LOW BACK PAIN WITH LEFT-SIDED SCIATICA: ICD-10-CM

## 2021-01-20 DIAGNOSIS — M54.32 SCIATICA OF LEFT SIDE: ICD-10-CM

## 2021-01-20 DIAGNOSIS — M47.817 LUMBOSACRAL SPONDYLOSIS WITHOUT MYELOPATHY: Primary | ICD-10-CM

## 2021-01-20 DIAGNOSIS — Z98.1 S/P SPINAL FUSION: ICD-10-CM

## 2021-01-20 DIAGNOSIS — G89.29 CHRONIC BILATERAL LOW BACK PAIN WITH LEFT-SIDED SCIATICA: ICD-10-CM

## 2021-01-28 ENCOUNTER — HOSPITAL ENCOUNTER (OUTPATIENT)
Dept: MRI IMAGING | Age: 66
Discharge: HOME | End: 2021-01-28
Attending: ORTHOPAEDIC SURGERY
Payer: MEDICARE

## 2021-01-28 DIAGNOSIS — M54.42 CHRONIC BILATERAL LOW BACK PAIN WITH LEFT-SIDED SCIATICA: ICD-10-CM

## 2021-01-28 DIAGNOSIS — M54.32 SCIATICA OF LEFT SIDE: ICD-10-CM

## 2021-01-28 DIAGNOSIS — M47.817 LUMBOSACRAL SPONDYLOSIS WITHOUT MYELOPATHY: ICD-10-CM

## 2021-01-28 DIAGNOSIS — Z98.1 S/P SPINAL FUSION: ICD-10-CM

## 2021-01-28 DIAGNOSIS — G89.29 CHRONIC BILATERAL LOW BACK PAIN WITH LEFT-SIDED SCIATICA: ICD-10-CM

## 2021-01-28 PROCEDURE — A9575 INJ GADOTERATE MEGLUMI 0.1ML: HCPCS | Performed by: ORTHOPAEDIC SURGERY

## 2021-01-28 PROCEDURE — 74011250636 HC RX REV CODE- 250/636: Performed by: ORTHOPAEDIC SURGERY

## 2021-01-28 PROCEDURE — 72158 MRI LUMBAR SPINE W/O & W/DYE: CPT

## 2021-01-28 RX ORDER — GADOTERATE MEGLUMINE 376.9 MG/ML
13 INJECTION INTRAVENOUS
Status: COMPLETED | OUTPATIENT
Start: 2021-01-28 | End: 2021-01-28

## 2021-01-28 RX ADMIN — GADOTERATE MEGLUMINE 15 ML: 376.9 INJECTION INTRAVENOUS at 19:53

## 2021-02-23 ENCOUNTER — HOSPITAL ENCOUNTER (OUTPATIENT)
Dept: GENERAL RADIOLOGY | Age: 66
Discharge: HOME | End: 2021-02-23
Attending: ORTHOPAEDIC SURGERY
Payer: MEDICARE

## 2021-02-23 ENCOUNTER — HOSPITAL ENCOUNTER (OUTPATIENT)
Dept: PREADMISSION TESTING | Age: 66
Discharge: HOME | End: 2021-02-23
Attending: ORTHOPAEDIC SURGERY
Payer: MEDICARE

## 2021-02-23 VITALS
HEART RATE: 82 BPM | HEIGHT: 62 IN | RESPIRATION RATE: 16 BRPM | TEMPERATURE: 98.5 F | OXYGEN SATURATION: 98 % | SYSTOLIC BLOOD PRESSURE: 118 MMHG | WEIGHT: 149.47 LBS | DIASTOLIC BLOOD PRESSURE: 69 MMHG | BODY MASS INDEX: 27.51 KG/M2

## 2021-02-23 LAB
25(OH)D3 SERPL-MCNC: 18 NG/ML (ref 30–100)
ABO + RH BLD: NORMAL
ALBUMIN SERPL-MCNC: 3.5 G/DL (ref 3.5–5)
ALBUMIN/GLOB SERPL: 1.1 {RATIO} (ref 1.1–2.2)
ALP SERPL-CCNC: 75 U/L (ref 45–117)
ALT SERPL-CCNC: 30 U/L (ref 12–78)
AMPHET UR QL SCN: NEGATIVE
ANION GAP SERPL CALC-SCNC: 5 MMOL/L (ref 5–15)
APPEARANCE UR: CLEAR
AST SERPL-CCNC: 17 U/L (ref 15–37)
ATRIAL RATE: 84 BPM
BACTERIA URNS QL MICRO: NEGATIVE /HPF
BARBITURATES UR QL SCN: NEGATIVE
BENZODIAZ UR QL: NEGATIVE
BILIRUB SERPL-MCNC: 0.2 MG/DL (ref 0.2–1)
BILIRUB UR QL: NEGATIVE
BLOOD GROUP ANTIBODIES SERPL: NORMAL
BUN SERPL-MCNC: 11 MG/DL (ref 6–20)
BUN/CREAT SERPL: 17 (ref 12–20)
CALCIUM SERPL-MCNC: 9 MG/DL (ref 8.5–10.1)
CALCULATED P AXIS, ECG09: 53 DEGREES
CALCULATED R AXIS, ECG10: 21 DEGREES
CALCULATED T AXIS, ECG11: 46 DEGREES
CANNABINOIDS UR QL SCN: NEGATIVE
CHLORIDE SERPL-SCNC: 107 MMOL/L (ref 97–108)
CO2 SERPL-SCNC: 26 MMOL/L (ref 21–32)
COCAINE UR QL SCN: NEGATIVE
COLOR UR: ABNORMAL
CREAT SERPL-MCNC: 0.65 MG/DL (ref 0.55–1.02)
DIAGNOSIS, 93000: NORMAL
DRUG SCRN COMMENT,DRGCM: NORMAL
EPITH CASTS URNS QL MICRO: ABNORMAL /LPF
ERYTHROCYTE [DISTWIDTH] IN BLOOD BY AUTOMATED COUNT: 13.7 % (ref 11.5–14.5)
EST. AVERAGE GLUCOSE BLD GHB EST-MCNC: 117 MG/DL
GLOBULIN SER CALC-MCNC: 3.1 G/DL (ref 2–4)
GLUCOSE SERPL-MCNC: 94 MG/DL (ref 65–100)
GLUCOSE UR STRIP.AUTO-MCNC: NEGATIVE MG/DL
HBA1C MFR BLD: 5.7 % (ref 4–5.6)
HCT VFR BLD AUTO: 43.3 % (ref 35–47)
HGB BLD-MCNC: 14.1 G/DL (ref 11.5–16)
HGB UR QL STRIP: NEGATIVE
HYALINE CASTS URNS QL MICRO: ABNORMAL /LPF (ref 0–5)
INR PPP: 0.9 (ref 0.9–1.1)
KETONES UR QL STRIP.AUTO: NEGATIVE MG/DL
LEUKOCYTE ESTERASE UR QL STRIP.AUTO: ABNORMAL
MCH RBC QN AUTO: 30.9 PG (ref 26–34)
MCHC RBC AUTO-ENTMCNC: 32.6 G/DL (ref 30–36.5)
MCV RBC AUTO: 94.7 FL (ref 80–99)
METHADONE UR QL: NEGATIVE
NITRITE UR QL STRIP.AUTO: NEGATIVE
NRBC # BLD: 0 K/UL (ref 0–0.01)
NRBC BLD-RTO: 0 PER 100 WBC
OPIATES UR QL: NEGATIVE
P-R INTERVAL, ECG05: 132 MS
PCP UR QL: NEGATIVE
PH UR STRIP: 7.5 [PH] (ref 5–8)
PLATELET # BLD AUTO: 199 K/UL (ref 150–400)
PMV BLD AUTO: 10.8 FL (ref 8.9–12.9)
POTASSIUM SERPL-SCNC: 4.1 MMOL/L (ref 3.5–5.1)
PREALB SERPL-MCNC: 25.6 MG/DL (ref 20–40)
PROT SERPL-MCNC: 6.6 G/DL (ref 6.4–8.2)
PROT UR STRIP-MCNC: NEGATIVE MG/DL
PROTHROMBIN TIME: 9.6 SEC (ref 9–11.1)
Q-T INTERVAL, ECG07: 394 MS
QRS DURATION, ECG06: 72 MS
QTC CALCULATION (BEZET), ECG08: 465 MS
RBC # BLD AUTO: 4.57 M/UL (ref 3.8–5.2)
RBC #/AREA URNS HPF: ABNORMAL /HPF (ref 0–5)
SODIUM SERPL-SCNC: 138 MMOL/L (ref 136–145)
SP GR UR REFRACTOMETRY: 1.01 (ref 1–1.03)
SPECIMEN EXP DATE BLD: NORMAL
UA: UC IF INDICATED,UAUC: ABNORMAL
UROBILINOGEN UR QL STRIP.AUTO: 0.2 EU/DL (ref 0.2–1)
VENTRICULAR RATE, ECG03: 84 BPM
WBC # BLD AUTO: 9 K/UL (ref 3.6–11)
WBC URNS QL MICRO: ABNORMAL /HPF (ref 0–4)

## 2021-02-23 PROCEDURE — 93005 ELECTROCARDIOGRAM TRACING: CPT

## 2021-02-23 PROCEDURE — 80307 DRUG TEST PRSMV CHEM ANLYZR: CPT

## 2021-02-23 PROCEDURE — 36415 COLL VENOUS BLD VENIPUNCTURE: CPT

## 2021-02-23 PROCEDURE — 87186 SC STD MICRODIL/AGAR DIL: CPT

## 2021-02-23 PROCEDURE — 81001 URINALYSIS AUTO W/SCOPE: CPT

## 2021-02-23 PROCEDURE — 97530 THERAPEUTIC ACTIVITIES: CPT | Performed by: PHYSICAL THERAPIST

## 2021-02-23 PROCEDURE — 86901 BLOOD TYPING SEROLOGIC RH(D): CPT

## 2021-02-23 PROCEDURE — 83036 HEMOGLOBIN GLYCOSYLATED A1C: CPT

## 2021-02-23 PROCEDURE — 97161 PT EVAL LOW COMPLEX 20 MIN: CPT | Performed by: PHYSICAL THERAPIST

## 2021-02-23 PROCEDURE — 82306 VITAMIN D 25 HYDROXY: CPT

## 2021-02-23 PROCEDURE — 87077 CULTURE AEROBIC IDENTIFY: CPT

## 2021-02-23 PROCEDURE — 85610 PROTHROMBIN TIME: CPT

## 2021-02-23 PROCEDURE — 87086 URINE CULTURE/COLONY COUNT: CPT

## 2021-02-23 PROCEDURE — 71046 X-RAY EXAM CHEST 2 VIEWS: CPT

## 2021-02-23 PROCEDURE — 84134 ASSAY OF PREALBUMIN: CPT

## 2021-02-23 PROCEDURE — 85027 COMPLETE CBC AUTOMATED: CPT

## 2021-02-23 PROCEDURE — 80053 COMPREHEN METABOLIC PANEL: CPT

## 2021-02-23 RX ORDER — ACETAMINOPHEN 500 MG
1000 TABLET ORAL ONCE
Status: CANCELLED | OUTPATIENT
Start: 2021-03-02 | End: 2021-03-02

## 2021-02-23 RX ORDER — BACLOFEN 10 MG/1
20 TABLET ORAL 3 TIMES DAILY
COMMUNITY
End: 2021-03-03

## 2021-02-23 RX ORDER — DOXYCYCLINE 100 MG/1
100 CAPSULE ORAL 2 TIMES DAILY
COMMUNITY
End: 2021-07-20

## 2021-02-23 RX ORDER — TRAZODONE HYDROCHLORIDE 50 MG/1
100 TABLET ORAL
COMMUNITY

## 2021-02-23 RX ORDER — PERPHENAZINE 16 MG
600 TABLET ORAL DAILY
COMMUNITY

## 2021-02-23 RX ORDER — CHOLECALCIFEROL TAB 125 MCG (5000 UNIT) 125 MCG
5000 TAB ORAL DAILY
COMMUNITY

## 2021-02-23 RX ORDER — SODIUM CHLORIDE, SODIUM LACTATE, POTASSIUM CHLORIDE, CALCIUM CHLORIDE 600; 310; 30; 20 MG/100ML; MG/100ML; MG/100ML; MG/100ML
25 INJECTION, SOLUTION INTRAVENOUS CONTINUOUS
Status: CANCELLED | OUTPATIENT
Start: 2021-03-02

## 2021-02-23 RX ORDER — GABAPENTIN 600 MG/1
1200 TABLET ORAL 3 TIMES DAILY
COMMUNITY

## 2021-02-23 RX ORDER — PREGABALIN 150 MG/1
150 CAPSULE ORAL ONCE
Status: CANCELLED | OUTPATIENT
Start: 2021-03-02 | End: 2021-03-02

## 2021-02-23 NOTE — PERIOP NOTES
Hibiclens/Chlorhexidine    Preventing Infections Before and After – Your Surgery    IMPORTANT INSTRUCTIONS    Please read and follow these instructions carefully. If you are unable to comply with the below instructions your procedure will be cancelled.       Every Night for Three (3) nights before your surgery:  1. Shower with an antibacterial soap, such as Dial, or the soap provided at your preassessment appointment. A shower is better than a bath for cleaning your skin.  2. If needed, ask someone to help you reach all areas of your body. Don’t forget to clean your belly button with every shower.    The night before your surgery:   If you lose your Hibiclens/chlorhexidine please contact surgery center or you can purchase it at a local pharmacy  1. On the night before your surgery, shower with an antibacterial soap, such as Dial, or the soap provided at your preassessment appointment.   2. With one packet of Hibiclens/Chlorhexidine in hand, turn water off.  3. Apply Hibiclens antiseptic skin cleanser with a clean, freshly washed washcloth.  ? Gently apply to your body from chin to toes (except the genital area) and especially the area(s) where your incision(s) will be.  ? Leave Hibiclens/Chlorhexidine on your skin for at least 20 seconds.    CAUTION: If needed, Hibiclens/chlorhexidine may be used to clean the folds of skin of the legs (such as in the area of the groin) and on your buttocks and hips. However, do not use Hibiclens/Chlorhexidine above the neck or in the genital area (your bottom) or put inside any area of your body.  4. Turn the water back on and rinse.  5. Dry gently with a clean, freshly washed towel.  6. After your shower, do not use any powder, deodorant, perfumes or lotion.  7. Use clean, freshly washed towels and washcloths every time you shower.  8. Wear clean, freshly washed pajamas to bed the night before surgery.  9. Sleep on clean, freshly washed sheets.  10. Do not allow pets to sleep in  your bed with you. The Morning of your surgery:  1. Shower again thoroughly with an antibacterial soap, such as Dial or the soap provided at your preassessment appointment. If needed, ask someone for help to reach all areas of your body. Dont forget to clean your belly button! Rinse. 2. Dry gently with a clean, freshly washed towel. 3. After your shower, do not use any powder, deodorant, perfumes or lotion prior to surgery. 4. Put on clean, freshly washed clothing. Tips to help prevent infections after your surgery:  1. Protect your surgical wound from germs:  ? Hand washing is the most important thing you and your caregivers can do to prevent infections. ? Keep your bandage clean and dry! ? Do not touch your surgical wound. 2. Use clean, freshly washed towels and washcloths every time you shower; do not share bath linens with others. 3. Until your surgical wound is healed, wear clothing and sleep on bed linens each day that are clean and freshly washed. 4. Do not allow pets to sleep in your bed with you or touch your surgical wound. 5. Do not smoke  smoking delays wound healing. This may be a good time to stop smoking. 6. If you have diabetes, it is important for you to manage your blood sugar levels properly before your surgery as well as after your surgery. Poorly managed blood sugar levels slow down wound healing and prevent you from healing completely. If you lose your Hibiclens/chlorhexidine, please call the [facility], or it is available for purchase at your pharmacy.                ___________________      ___________________      2/23/21 @ 1500  (Signature of Patient)          (Witness)                   (Date and Time)

## 2021-02-23 NOTE — PERIOP NOTES
Incentive Spirometer        Using the incentive spirometer helps expand the small air sacs of your lungs, helps you breathe deeply, and helps improve your lung function. Use your incentive spirometer twice a day (10 breaths each time) prior to surgery. How to Use Your Incentive Spirometer:  1. Hold the incentive spirometer in an upright position. 2. Breathe out as usual.   3. Place the mouthpiece in your mouth and seal your lips tightly around it. 4. Take a deep breath. Breathe in slowly and as deeply as possible. Keep the blue flow rate guide between the arrows. 5. Hold your breath as long as possible. Then exhale slowly and allow the piston to fall to the bottom of the column. 6. Rest for a few seconds and repeat steps one through five at least 10 times. PAT Tidal Volume_____[id number]________  x_____2___________  Date__2/23/21____    Jakie Kawasaki THE INCENTIVE SPIROMETER WITH YOU TO THE HOSPITAL ON THE DAY OF YOUR SURGERY. Opportunity given to ask and answer questions as well as to observe return demonstration.     Patient signature_____________________________          Witness____________________________

## 2021-02-23 NOTE — PERIOP NOTES
[facility]  Joint/Spine Preoperative Instructions    Surgery Date 3/2/21          Time of Arrival 0529  Contact # [phone] cell    1. On the day of your surgery, please report to the Surgical Services Registration Desk and sign in at your designated time. The Surgery Center is located to the right of the Emergency Room. 2. You must have someone with you to drive you home. You should not drive a car for 24 hours following surgery. Please make arrangements for a friend or family member to stay with you for the first 24 hours after your surgery. 3. No food after midnight 3/1/21. Medications morning of surgery should be taken with a sip of water. Please follow pre-surgery drink instructions that were given at your Pre Admission Testing appointment. 4. We recommend you do not drink any alcoholic beverages for 24 hours before and after your surgery. 5. Contact your surgeons office for instructions on the following medications: non-steroidal anti-inflammatory drugs (i.e. Advil, Aleve), vitamins, and supplements. (Some surgeons will want you to stop these medications prior to surgery and others may allow you to take them)  **If you are currently taking Plavix, Coumadin, Aspirin and/or other blood-thinning agents, contact your surgeon for instructions. ** Your surgeon will partner with the physician prescribing these medications to determine if it is safe to stop or if you need to continue taking. Please do not stop taking these medications without instructions from your surgeon    6. Wear comfortable clothes. Wear glasses instead of contacts. Do not bring any money or jewelry. Please bring picture ID, insurance card, and any prearranged co-payment or hospital payment. Do not wear make-up, particularly mascara the morning of your surgery. Do not wear nail polish, particularly if you are having foot /hand surgery.   Wear your hair loose or down, no ponytails, buns, christi pins or clips.  All body piercings must be removed.  Please shower with antibacterial soap for three consecutive days before and on the morning of surgery, but do not apply any lotions, powders or deodorants after the shower on the day of surgery. Please use a fresh towels after each shower. Please sleep in clean clothes and change bed linens the night before surgery.  Please do not shave for 48 hours prior to surgery. Shaving of the face is acceptable.    7. You should understand that if you do not follow these instructions your surgery may be cancelled.  If your physical condition changes (I.e. fever, cold or flu) please contact your surgeon as soon as possible.    8. It is important that you be on time.  If a situation occurs where you may be late, please call [phone] (OR Holding Area).    9. If you have any questions and or problems, please call [phone] (Pre-admission Testing).    10. Your surgery time may be subject to change.  You will receive a phone call the evening prior if your time changes.    11.  If having outpatient surgery, you must have someone to drive you here, stay with you during the duration of your stay, and to drive you home at time of discharge.    12. The following link is for the educational video for patients and/or families.    [email or web address]    Special Instructions:   Covid test scheduled 2/26/21 (Friday) at 6780-2763 AM.  Please see attached directions/location.  Patient advised to self-quarantine after test and up to day of surgery.    TAKE ALL MEDICATIONS THE DAY OF SURGERY EXCEPT: None      I understand a pre-operative phone call will be made to verify my surgery time.  In the event that I am not available, I give permission for a message to be left on my answering service and/or with another person?  yes         ___________________        __________   2/23/21 @  1500    (Signature of Patient)             (Witness)                (Date and Time)

## 2021-02-23 NOTE — PROGRESS NOTES
[facility]  Physical Therapy Pre-surgery evaluation  [address] [place] [address]    PHYSICAL THERAPY PRE SPINE SURGERY EVALUATION  Patient:Heidi Jovel (65 y.o. female)  Date: 2/23/2021    pat  Procedure(s) (LRB):  LEFT L5-S1 DISCECTOMY (CHOICE) (Left)     Precautions: falls         ASSESSMENT :  Based on the objective data described below, the patient presents with impaired balance and sensation  due to end stage degenerative joint disease in the spinal level: lumbar spine. Patient reports neuropathy in B feet secondary to receiving radiation for breast cancer in 2009. She states she requires an assistive device for ambulation and has had occasional falls, but no falls within last 6 months    Discussed anticipated disposition to home with possible discharge within a 1 to 2 day time frame post-surgery. Patient and  in agreement.     Anticipate patient will need acute PT and OT orders based on expected deficits post surgery.     GOALS: (Goals have been discussed and agreed upon with patient.)  DISCHARGE GOALS: Time Frame: 1 DAY  1. Patient will demonstrate increased strength, range of motion, and pain control via a home exercise program in order to minimize functional deficits in preparation for their upcoming surgery. This will be achieved by using education, demonstration and through the use of an informational handout including a home exercise program.  REHABILITATION POTENTIAL FOR STATED GOALS: Good     RECOMMENDATIONS AND PLANNED INTERVENTIONS: (Benefits and precautions of physical therapy have been discussed with the patient.)  · Home Exercise Program  TREATMENT PLAN EFFECTIVE DATES: 2/23/2021 to 2/23/2021   FREQUENCY/DURATION: Patient to continue to perform home exercise program at least twice daily until surgery.     SUBJECTIVE:   Patient stated “My pain is really sneeking up on me. It is a 7-8 now.”    OBJECTIVE DATA SUMMARY:   HISTORY:      Past Medical History:  Diagnosis Date    Arthritis     RHEUMATOID AND OSTEOARTHRITIS    Breast cancer ([facility] [address] 2008    right    C. difficile diarrhea 2004    as of 10/23/14 pt denies any diarrhea or sx    Cancer ([facility] [address] 1982    uterine CA, hysterectomy, no chemo / radiation    Cancer ([facility] [address] 2008    Rt BR; lumpectomy, radiation with chemo pill x6 yrs    Fibromyalgia     GERD (gastroesophageal reflux disease)     Hypercholesteremia     Neuropathy     Osteoarthritis     Other ill-defined conditions(799.89)     GRANULOMA ANEULARE, SKIN DISORDER    Psychiatric disorder     ANXIETY    Rheumatoid arthritis ([facility] [address]     S/P radiation therapy 2008    right breast    Seasonal allergies      Past Surgical History:   Procedure Laterality Date    COLONOSCOPY N/A 5/25/2018    COLONOSCOPY performed [facility]    HX APPENDECTOMY      HX CARPAL TUNNEL RELEASE Bilateral     HX CERVICAL DISKECTOMY  2/3/14    C4-5     HX COLONOSCOPY      HX GI      ENDOSCOPY (DILATATION/ESOPH)    HX HYSTERECTOMY  1982    partial    HX KNEE ARTHROSCOPY Left     x2    HX LUMBAR DISKECTOMY  06/2018    HX MOHS PROCEDURES Right     HX ORTHOPAEDIC  2005, 2011    CERVICAL NECKx 3    HX ORTHOPAEDIC Bilateral 1988    carpal tunnel repair, and bilater knee arthroscopy    HX ORTHOPAEDIC Left     shoulder arthroscopy    HX OTHER SURGICAL  3/2008    hematoma s/p Rt BR lumpectomy    HX TONSILLECTOMY  1963    T&A    MRI GUIDE BREAST BX LT Left     [place] ABDOMEN SURGERY PROC UNLISTED  1998    COLECTOMY (1/2 REMOVED; \"WAS IN KNOTS\")    [place] BREAST SURGERY PROCEDURE UNLISTED Right 2/2008    RIGHT BREAST LUMPECTOMY       Prior Level of Function/Home Situation: patient reports modified independence with all mobility and ADLs.  She only drive occasionally and has not driven in the last two months  Personal factors and/or comorbidities impacting plan of care:       Home Situation  Home Environment: Private residence  # Steps to Enter: 4  Rails to Enter: Yes  Hand Rails : Bilateral  One/Two Story Residence: Two story, live on 1st floor  Living Alone: No  Support Systems: Spouse/Significant Other/Partner  Patient Expects to be Discharged to[de-identified] Private residence  Current DME Used/Available at Home: Adaptive dressing aides, [employer], straight, Commode, bedside, Grab bars, Shower chair, Walker, rollator, Walker, rolling, Wheelchair  Tub or Shower Type: Shower        EXAMINATION/PRESENTATION/DECISION MAKING:     ADLs (Current Functional Status): Bathing/Showering:   [x] Independent  [] Requires Assistance from Someone  [] Sponge Bath Only   Ambulation:  [] Independent  [] Walk Indoors Only  [] Walk Outdoors  [x] Use Assistive Device  [] Use Wheelchair Only     Dressing:  [x] Independent    Requires Assistance from Someone for:  [] Sock/Shoes  [] Pants  [] Everything   Household Activities:  [] Routine house and yard work  [x] Light Housework Only  [] None       Critical Behavior:  Neurologic State: Alert, Appropriate for age  Orientation Level: Oriented X4          Strength:     Strength:  Within functional limits                       Tone & Sensation:   Tone: Normal              Sensation: Impaired(neuropathy in B feet from radiation; numbness and pain in L )                  Range Of Motion:     AROM: Within functional limits                            Coordination:   Coordination: Within functional limits    Functional Mobility:  Transfers:  Sit to Stand: Modified independent  Stand to Sit: Modified independent                       Balance:   Sitting: Intact  Standing: Impaired  Standing - Static: Good  Standing - Dynamic : Good(using rollator)  Ambulation/Gait Training:  Distance (ft): 100 Feet (ft)  Assistive Device: Walker, rollator  Ambulation - Level of Assistance: Modified independent        Gait Abnormalities: Path deviations, Trunk sway increased, Antalgic        Base of Support: Narrowed     Speed/Lesvia: Pace decreased (<100 feet/min)  Step Length: Left shortened, Right shortened         gait is slow and slightly unsteady with proximal weakness noted at low trunk; no overt LOB         Functional Measure:  10 Meter walk test:  (Specify if any supplemental oxygen is used, the type, pre, during and post sats.)       Trial 1 (Time to Walk 10 Meters): 19.5 Seconds  Trial 2 (Time to Walk 10 Meters): 16.85 Seconds  Trial 3 (Time to Walk 10 Meters): 18.82 Seconds  Average : 18.4 Seconds  Score (Meters/Second): 0.5 Meters/Second             Walking Speed (m/s)  Modifier Scale Age 52-63 Age 61-76 Age 66-77 Age 80-80    Male Female Male Female Male Female Male Female   CH   0% Impaired ? 1.39 ? 1.40 ? 1.36 ? 1.30 ? 1.33 ? 1.27 ? 1.21 ? 1.15   CI   1-19% Impaired 1.11-1.38 1.12-1.39 1.09-1.35 1.04-1.29 1.06-1.32 1.01-1.26 0.96-1.20 0.92-1.14   CJ   20-39% Impaired 0.83-1.10 0.84-1.11 0.82-1.08 0.78-1.03 0.80-1.05 0.76-1.00 0.72-0.95 0.69-0.91   CK   40-59% Impaired 0.56-0.82 0.57-0.83 0.54-0.81 0.52-0.77 0.53-0.79 0.51-0.75 0.48-0.71 0.46-0.68   CL   60-79% Impaired 0.28-0.55 0.28-0.56 0.27-0.53 0.26-0.51 0.27-0.52 0.25-0.50 0.24-0.49 0.23-0.45   CM   80-99% Impaired 0.01-0.28 < 0.01-0.28 < 0.01-0.27 < 0.01-0.26 0.01-0.27 0.01-0.24 0.01-0.23 0.01-0.22   CN   100% Impaired Cannot Perform   Minimal Detectable Change (MDC-90) = 0.1 m/s  Julius LYON \"Comfortable and maximum walking speed of adults aged 20-79 years: reference values and determinants. \" Age and Agin Volume 26(1):15-9. Abhi Hodges. \"Age- and gender-related test performance in community-dwelling elderly people: Six-Minute Walk Test, Moore Balance Scale, Timed Up & Go Test, and gait speeds. \" Physical Therapy: 2002 Volume 82(2):128-37. Ronaldo BELCHER, Hema CADET, Emmy RIOSD, [facility] EPPERSON. \"Assessing stability and change of four performance measures: a longitudinal study evaluating outcome following total hip and knee arthroplasty. \" [facility] Musculoskeletal Disorders: 2005 Volume 6(3).  Brijesh Trammell, PhD; Carleen Serna, PhD. [place] [employer] Paper: \"Walking Speed: the Sixth Vital Sign\" Journal of Geriatric Physical Therapy: 2009 - Volume 32 - Issue 2 - p 25 . Pain:  Pain Scale 1: Numeric (0 - 10)  Pain Intensity 1: 5  Pain Location 1: Back  Pain Orientation 1: Lower  Pain Description 1: Aching       Radicular pain:   Patient reports pain and numbness radiating down posterior aspect of LLE to foot    Activity Tolerance:   Fair- limited by pain  Patient []   does  [x]   does not demonstrate signs/symptoms of shortness of breath/dyspnea on exertion/respiratory distress. COMMUNICATION/EDUCATION:   The patient was educated on:  [x]         Early post operative mobility is imperative to achieve a patient's desired outcomes and to restore biological function. [x]         Post operative spinal precautions may/may not be applicable. These precautions are based on the patient's physician and the procedure(s) performed. [x]         Spinal precautions including:  ·   No bending forward, sideways, or backwards  ·   No twisting   ·   No lifting more than 5-10 pounds  ·   No sitting longer than 30-60 minutes at a time  ·   Migel brace when out of bed and mobilizing    The patients plan of care was discussed as follows:   [x]         The patient verbalized understanding of his/her plan in preparation for their upcoming surgery  []         The patient's  was present for this session  []        The patient reports that he/she does not have a  identified at this time  []         The  verbalized understanding of the education regarding the patient's upcoming surgery  [x]         Patient/family agree to work toward stated goals and plan of care. []         Patient understands intent and goals of therapy, but is neutral about his/her participation. []         Patient is unable to participate in goal setting and plan of care.       Thank you for this referral.  Sparkle Valderrama, PT    Time Calculation: 27 mins

## 2021-02-23 NOTE — PERIOP NOTES
Orthopedic and Spine Patients: Instructions on When You Can   Eat or Drink Before Surgery      You have been provided 2 pre-surgery drinks received at your pre-admission testing appointment.  Night before surgery:  o You should drink one bottle of the  pre-surgery drink at bedtime. No food after midnight!  Day of Surgery:  o Complete 2nd bottle of the pre-surgery drink 1 hour prior to arrival at hospital.  For questions call Pre-Admission Testing at [phone]. They are available from 8:00am-5:00pm, Monday through Friday.

## 2021-02-24 LAB
BACTERIA SPEC CULT: NORMAL
BACTERIA SPEC CULT: NORMAL
SERVICE CMNT-IMP: NORMAL

## 2021-02-24 NOTE — ADVANCED PRACTICE NURSE
PAT Nurse Practitioner   Pre-Operative Chart Review/Assessment:-ORTHOPEDIC/NEUROSURGICAL SPINE                Patient Name:  Rk Camarillo                                                         Age:   63 y.o.    :  1955     Today's Date:  2021     Date of PAT:   21      Date of Surgery:    3/2/21     Procedure(s):    Left L5-S1 discectomy     Surgeon:   Rosa Perez     Medical Clearance:  Birdia Severance, DNP                   PLAN:      1)  Cardiac Clearance:  Not requested        2)  Diabetic Treatment Consult:  Not indicated-A1C 5.7       3)  Sleep Apnea evaluation:   Not indicated-ANGELA 1       4) Treatment for MRSA/Staph Aureus:  Negative      5) Additional Concerns:  Current smoker, fibromyalgia, RA, neuropathy, chronic pain. Hx of R breast CA. Ambulates w/ walker.  Reports falls w/in last year                 Vital Signs:         Vitals:    21 1432   BP: 118/69   Pulse: 82   Resp: 16   Temp: 98.5 °F (36.9 °C)   SpO2: 98%   Weight: 67.8 kg (149 lb 7.6 oz)   Height: 5' 2\" (1.575 m)            ____________________________________________  PAST MEDICAL HISTORY  Past Medical History:   Diagnosis Date    Arthritis     RHEUMATOID AND OSTEOARTHRITIS    Breast cancer ([facility] [address]     right    C. difficile diarrhea 2004    as of 10/23/14 pt denies any diarrhea or sx    Cancer ([facility] [address] 1982    uterine CA, hysterectomy, no chemo / radiation    Cancer ([facility] [address] 2008    Rt BR; lumpectomy, radiation with chemo pill x6 yrs    Fibromyalgia     GERD (gastroesophageal reflux disease)     Hypercholesteremia     Neuropathy     Osteoarthritis     Other ill-defined conditions(799.89)     GRANULOMA ANEULARE, SKIN DISORDER    Psychiatric disorder     ANXIETY    Rheumatoid arthritis ([facility] [address] [facility]     S/P radiation therapy 2008    right breast    Seasonal allergies       ____________________________________________  PAST SURGICAL HISTORY  Past Surgical History:   Procedure Laterality Date    COLONOSCOPY N/A 5/25/2018    COLONOSCOPY [facility]ana Dick [facility] END[facility]COPY    HX APPENDECTOMY      HX CARPAL TUNNEL RELEASE Bilateral     HX CERVICAL DISKECTOMY  2/3/14    C4-5     HX COLONOSCOPY      HX GI      ENDOSCOPY (DILATATION/ESOPH)    HX HYSTERECTOMY  1982    partial    HX KNEE ARTHROSCOPY Left     x2    HX LUMBAR DISKECTOMY  06/2018    HX MOHS PROCEDURES Right     HX ORTHOPAEDIC  2005, 2011    CERVICAL NECKx 3    HX ORTHOPAEDIC Bilateral 1988    carpal tunnel repair, and bilater knee arthroscopy    HX ORTHOPAEDIC Left     shoulder arthroscopy    HX OTHER SURGICAL  3/2008    hematoma s/p Rt BR lumpectomy    HX TONSILLECTOMY  1963    T&A    MRI GUIDE BREAST BX LT Left     UT ABDOMEN SURGERY PROC UNLISTED  1998    COLECTOMY (1/2 REMOVED; \"WAS IN KNOTS\")    UT BREAST SURGERY PROCEDURE UNLISTED Right 2/2008    RIGHT BREAST LUMPECTOMY      ____________________________________________  HOME MEDICATIONS    Current Outpatient Medications   Medication Sig    cephALEXin (Keflex) 500 mg capsule Take 1 Cap by mouth two (2) times a day for 7 days. Indications: infection of the urinary tract from Proteus bacteria    TURMERIC PO Take 500 mg by mouth daily.  cholecalciferol (Vitamin D3) (5000 Units/125 mcg) tab tablet Take 5,000 Units by mouth daily.  Alpha Lipoic Acid 600 mg cap Take 600 mg by mouth daily.  doxycycline (MONODOX) 100 mg capsule Take 100 mg by mouth two (2) times a day. Skin disorder    traZODone (DESYREL) 50 mg tablet Take 100 mg by mouth nightly.  gabapentin (NEURONTIN) 600 mg tablet Take 1,200 mg by mouth three (3) times daily.  baclofen (LIORESAL) 10 mg tablet Take 20 mg by mouth three (3) times daily.  oxyCODONE IR (ROXICODONE) 5 mg immediate release tablet Take 1-2 Tabs by mouth every four (4) hours as needed. Max Daily Amount: 60 mg. If insurance prior auth./quantity restrictions apply, refer to and look up diagnosis code one prescription.   Partial fill as needed is permitted. Please do not contact prescriber.  acetaminophen (TYLENOL EXTRA STRENGTH) 500 mg tablet Take 1,000 mg by mouth every six (6) hours as needed for Pain.  esomeprazole (NEXIUM) 20 mg capsule Take 20 mg by mouth daily. Indications: Heartburn    diclofenac (VOLTAREN) 1 % gel Apply  to affected area as needed.  DULoxetine (CYMBALTA) 60 mg capsule TAKE 2 CAPSULES DAILY (Patient taking differently: Take 60 mg by mouth two (2) times a day.)    diphenhydrAMINE (BENADRYL) 50 mg tablet Take 100 mg by mouth two (2) times a day. Indications: ALLERGIC REACTIONS    SIMVASTATIN (ZOCOR PO) Take 20 mg by mouth nightly.  diphenoxylate-atropine (LOMOTIL) 2.5-0.025 mg per tablet Take 1 Tab by mouth four (4) times daily as needed. No current facility-administered medications for this encounter.       ____________________________________________  ALLERGIES  Allergies   Allergen Reactions    Aspirin Other (comments)     Blacked out. Talked with patient.  Can take other NSAIDS like Ibuprofen, Naprosyn      ____________________________________________  SOCIAL HISTORY  Social History     Tobacco Use    Smoking status: Current Every Day Smoker     Packs/day: 0.50     Years: 45.00     Pack years: 22.50    Smokeless tobacco: Never Used   Substance Use Topics    Alcohol use: Yes     Comment: once a month      ____________________________________________        Labs:     Hospital Outpatient Visit on 02/23/2021   Component Date Value Ref Range Status    WBC 02/23/2021 9.0  3.6 - 11.0 K/uL Final    RBC 02/23/2021 4.57  3.80 - 5.20 M/uL Final    HGB 02/23/2021 14.1  11.5 - 16.0 g/dL Final    HCT 02/23/2021 43.3  35.0 - 47.0 % Final    MCV 02/23/2021 94.7  80.0 - 99.0 FL Final    MCH 02/23/2021 30.9  26.0 - 34.0 PG Final    MCHC 02/23/2021 32.6  30.0 - 36.5 g/dL Final    RDW 02/23/2021 13.7  11.5 - 14.5 % Final    PLATELET 79/59/8183 898  150 - 400 K/uL Final    MPV 02/23/2021 10.8  8.9 - 12.9 FL Final    NRBC 02/23/2021 0.0  0  WBC Final    ABSOLUTE NRBC 02/23/2021 0.00  0.00 - 0.01 K/uL Final    Special Requests: 02/23/2021 NO SPECIAL REQUESTS    Final    Culture result: 02/23/2021 MRSA NOT PRESENT    Final    Culture result: 02/23/2021 Screening of patient nares for MRSA is for surveillance purposes and, if positive, to facilitate isolation considerations in high risk settings. It is not intended for automatic decolonization interventions per se as regimens are not sufficiently effective to warrant routine use. Final    Ventricular Rate 02/23/2021 84  BPM Final    Atrial Rate 02/23/2021 84  BPM Final    P-R Interval 02/23/2021 132  ms Final    QRS Duration 02/23/2021 72  ms Final    Q-T Interval 02/23/2021 394  ms Final    QTC Calculation (Bezet) 02/23/2021 465  ms Final    Calculated P Axis 02/23/2021 53  degrees Final    Calculated R Axis 02/23/2021 21  degrees Final    Calculated T Axis 02/23/2021 46  degrees Final    Diagnosis 02/23/2021    Final                    Value:Normal sinus rhythm  Possible Left atrial enlargement  When compared with ECG of 14-JUN-2018 12:10,  No significant change was found  Confirmed by Ofe Suarez MD, Sendy Hamm ([id number]) on 2/23/2021 4:17:26 PM      Hemoglobin A1c 02/23/2021 5.7* 4.0 - 5.6 % Final    Comment: NEW METHOD  PLEASE NOTE NEW REFERENCE RANGE  (NOTE)  HbA1C Interpretive Ranges  <5.7              Normal  5.7 - 6.4         Consider Prediabetes  >6.5              Consider Diabetes      Est. average glucose 02/23/2021 117  mg/dL Final    INR 02/23/2021 0.9  0.9 - 1.1   Final    A single therapeutic range for Vit K antagonists may not be optimal for all indications - see June, 2008 issue of Chest, American College of Chest Physicians Evidence-Based Clinical Practice Guidelines, 8th Edition.     Prothrombin time 02/23/2021 9.6  9.0 - 11.1 sec Final    Color 02/23/2021 YELLOW/STRAW    Final    Color Reference Range: Straw, Yellow or Dark Yellow    Appearance 02/23/2021 CLEAR  CLEAR   Final    Specific gravity 02/23/2021 1.007  1.003 - 1.030   Final    pH (UA) 02/23/2021 7.5  5.0 - 8.0   Final    Protein 02/23/2021 Negative  NEG mg/dL Final    Glucose 02/23/2021 Negative  NEG mg/dL Final    Ketone 02/23/2021 Negative  NEG mg/dL Final    Bilirubin 02/23/2021 Negative  NEG   Final    Blood 02/23/2021 Negative  NEG   Final    Urobilinogen 02/23/2021 0.2  0.2 - 1.0 EU/dL Final    Nitrites 02/23/2021 Negative  NEG   Final    Leukocyte Esterase 02/23/2021 SMALL* NEG   Final    WBC 02/23/2021 10-20  0 - 4 /hpf Final    RBC 02/23/2021 0-5  0 - 5 /hpf Final    Epithelial cells 02/23/2021 FEW  FEW /lpf Final    Epithelial cell category consists of squamous cells and /or transitional urothelial cells. Renal tubular cells, if present, are separately identified as such.  Bacteria 02/23/2021 Negative  NEG /hpf Final    UA:UC IF INDICATED 02/23/2021 URINE CULTURE ORDERED* CNI   Final    Hyaline cast 02/23/2021 0-2  0 - 5 /lpf Final    Sodium 02/23/2021 138  136 - 145 mmol/L Final    Potassium 02/23/2021 4.1  3.5 - 5.1 mmol/L Final    Chloride 02/23/2021 107  97 - 108 mmol/L Final    CO2 02/23/2021 26  21 - 32 mmol/L Final    Anion gap 02/23/2021 5  5 - 15 mmol/L Final    Glucose 02/23/2021 94  65 - 100 mg/dL Final    BUN 02/23/2021 11  6 - 20 MG/DL Final    Creatinine 02/23/2021 0.65  0.55 - 1.02 MG/DL Final    BUN/Creatinine ratio 02/23/2021 17  12 - 20   Final    GFR est AA 02/23/2021 >60  >60 ml/min/1.73m2 Final    GFR est non-AA 02/23/2021 >60  >60 ml/min/1.73m2 Final    Estimated GFR is calculated using the IDMS-traceable Modification of Diet in Renal Disease (MDRD) Study equation, reported for both  Americans (GFRAA) and non- Americans (GFRNA), and normalized to 1.73m2 body surface area. The physician must decide which value applies to the patient.     Calcium 02/23/2021 9.0  8.5 - 10.1 MG/DL Final    Bilirubin, total 02/23/2021 0.2  0.2 - 1.0 MG/DL Final    ALT (SGPT) 02/23/2021 30  12 - 78 U/L Final    AST (SGOT) 02/23/2021 17  15 - 37 U/L Final    Alk. phosphatase 02/23/2021 75  45 - 117 U/L Final    Protein, total 02/23/2021 6.6  6.4 - 8.2 g/dL Final    Albumin 02/23/2021 3.5  3.5 - 5.0 g/dL Final    Globulin 02/23/2021 3.1  2.0 - 4.0 g/dL Final    A-G Ratio 02/23/2021 1.1  1.1 - 2.2   Final    AMPHETAMINES 02/23/2021 Negative  NEG   Final    BARBITURATES 02/23/2021 Negative  NEG   Final    BENZODIAZEPINES 02/23/2021 Negative  NEG   Final    COCAINE 02/23/2021 Negative  NEG   Final    METHADONE 02/23/2021 Negative  NEG   Final    OPIATES 02/23/2021 Negative  NEG   Final    PCP(PHENCYCLIDINE) 02/23/2021 Negative  NEG   Final    THC (TH-CANNABINOL) 02/23/2021 Negative  NEG   Final    Drug screen comment 02/23/2021 (NOTE)   Final    Comment: This test is a screen for drugs of abuse in a medical setting only   (i.e., they are unconfirmed results and as such must not be used for   non-medical purposes e.g., employment testing, legal testing). Due to   its inherent nature, false positive (FP) and false negative (FN)   results may be obtained. Therefore, if necessary for medical care,   recommend confirmation of positive findings by GC/MS. The cutoff   values (i.e., the level at which this screening test becomes positive   for a given drug group) are:    Amphetamine/Methamphetamine: 300 ng/mL  Barbiturates:                200 ng/mL  Benzodiazepines:             200 ng/mL  Cocaine:                     150 ng/mL  Methadone:                   300 ng/mL  Opiates:                     300 ng/mL   Phencyclidine, PCP:           25 ng/mL  Marijuana, THC:               50 ng/mL    This screening test can identify the presence of the following drugs   when above the cutoff value; see list posted on the intranet.  It can   be viewed by saman                           ecting in sequence the following from the [address] home   page: Local Systems; [address] [facility], Resources; [facility], Physician Resources Q to Z; \"UDS (Urine Drug Screen   Automated) List of Detectable Drugs. \"     Or use web address:   http://[facility]/[place]/[facility]/Physician%20Resources/  UDS%20List%20of%20Detectable%20Drugs. pdf      Vitamin D 25-Hydroxy 02/23/2021 18.0* 30 - 100 ng/mL Final    Comment: (NOTE)  Deficiency               <20 ng/mL  Insufficiency          20-30 ng/mL  Sufficient             ng/mL  Possible toxicity       >100 ng/mL    The Method used is Siemens Advia Centaur currently standardized to a   Center of Disease Control and Prevention (CDC) certified reference   [address]. Samples containing fluorescein dye can produce falsely   elevated values when tested with the ADVIA Centaur Vitamin D Assay. It is recommended that results in the toxic range, >100 ng/mL, be   retested 72 hours post fluorescein exposure.       Crossmatch Expiration 02/23/2021 03/05/2021,2359   Final    ABO/Rh(D) 02/23/2021 B POSITIVE   Final    Antibody screen 02/23/2021 NEG   Final    Special Requests: 02/23/2021     Final                    Value:NO SPECIAL REQUESTS  Reflexed from [id number]      Liguori Count 02/23/2021     Final                    Value:[id number]  COLONIES/mL      Culture result: 02/23/2021 PROTEUS MIRABILIS*   Final    Prealbumin 02/23/2021 25.6  20.0 - 40.0 mg/dL Final      CULTURE, URINE [HDV0005] (Order [id number])  Microbiology  Date: 2/23/2021 Department: [facility] Or Preadmit Tsting Released By:  (auto-released) Authorizing: Conception Corrales, MD   Specimen Information: Urine        Component Value Flag Ref Range Units Status   Special Requests:      Final   NO SPECIAL REQUESTS   Reflexed from [id number]    Liguori Count [id number]   COLONIES/mL      Final   Culture result: PROTEUS MIRABILIS  Abnormal      Final   Susceptibility    Proteus mirabilis    Antibiotic Interpretation Value Method Comment   Amikacin ($) Susceptible <=2 ug/mL MARC    Ampicillin ($) Susceptible <=2 ug/mL MARC    Ampicillin/sulbactam ($) Susceptible <=2 ug/mL MARC    Cefazolin ($) Susceptible <=4 ug/mL MARC    Cefepime ($$) Susceptible <=1 ug/mL MARC    Cefoxitin Susceptible 8 ug/mL MARC    Ceftazidime ($) Susceptible <=1 ug/mL MARC    Ceftriaxone ($) Susceptible <=1 ug/mL MARC    Ciprofloxacin ($) Susceptible <=0.25 ug/mL MARC    Gentamicin ($) Susceptible <=1 ug/mL MARC    Levofloxacin ($) Susceptible <=0.12 ug/mL MARC    Meropenem ($$) Susceptible <=0.25 ug/mL MARC    Nitrofurantoin Resistant 128 ug/mL MARC    Piperacillin/Tazobac ($) Susceptible <=4 ug/mL MARC    Tobramycin ($) Susceptible <=1 ug/mL MARC    Trimeth/Sulfa Resistant >=320 ug/mL MARC    Susceptibility    Proteus mirabilis (1)    Antibiotic Interpretation Method Status    Amikacin ($) Susceptible MARC Final    Ampicillin ($) Susceptible MARC Final    Ampicillin/sulbactam ($) Susceptible MARC Final    Cefazolin ($) Susceptible MARC Final    Cefepime ($$) Susceptible MARC Final    Cefoxitin Susceptible MARC Final    Ceftazidime ($) Susceptible MARC Final    Ceftriaxone ($) Susceptible MARC Final    Ciprofloxacin ($) Susceptible MARC Final    Gentamicin ($) Susceptible MARC Final    Levofloxacin ($) Susceptible MARC Final    Meropenem ($$) Susceptible MARC Final    Nitrofurantoin Resistant MARC Final    Piperacillin/Tazobac ($) Susceptible MARC Final    Tobramycin ($) Susceptible MARC Final    Trimeth/Sulfa Resistant MARC Final    Lab and Collection    CULTURE, URINE (Order: [id number]) - 2/23/2021  Result History    CULTURE, URINE (Order #[id number]) on 2/25/2021 - Order Result History Report   2/25/2021  2:03 PM - [place], Lab In [facility]    Specimen Information    Urine        Collected: 2/23/2021 1312       Final Report Date/time    Reported date Reported time   Feb 25, 2021 14:02 EST         Skin:   Denies open wounds, cuts, sores, rashes or other areas of concern in PAT assessment.        Karime Raphael, RENE     2/25/21 PC to pt regarding  urine cx results. Pt denies fever, chills, dysuria, hematuria, but does report some frequency/urgency. Allergies reviewed. Rx for Keflex 500 mg BID x 7 days escribed to [facility] pt pt request.  uZlly Simms, assistant to Dr. Jeet Lino advised.

## 2021-02-25 LAB
BACTERIA SPEC CULT: ABNORMAL
CC UR VC: ABNORMAL
SERVICE CMNT-IMP: ABNORMAL

## 2021-02-25 RX ORDER — CEPHALEXIN 500 MG/1
500 CAPSULE ORAL 2 TIMES DAILY
COMMUNITY
Start: 2021-02-25 | End: 2021-03-03

## 2021-02-25 RX ORDER — CEPHALEXIN 500 MG/1
500 CAPSULE ORAL 2 TIMES DAILY
Qty: 14 CAP | Refills: 0 | Status: SHIPPED | OUTPATIENT
Start: 2021-02-25 | End: 2021-03-04

## 2021-02-26 ENCOUNTER — HOSPITAL ENCOUNTER (OUTPATIENT)
Dept: PREADMISSION TESTING | Age: 66
Discharge: HOME | End: 2021-02-26
Payer: MEDICARE

## 2021-02-26 LAB — SARS-COV-2, COV2: NORMAL

## 2021-02-26 PROCEDURE — U0003 INFECTIOUS AGENT DETECTION BY NUCLEIC ACID (DNA OR RNA); SEVERE ACUTE RESPIRATORY SYNDROME CORONAVIRUS 2 (SARS-COV-2) (CORONAVIRUS DISEASE [COVID-19]), AMPLIFIED PROBE TECHNIQUE, MAKING USE OF HIGH THROUGHPUT TECHNOLOGIES AS DESCRIBED BY CMS-2020-01-R: HCPCS

## 2021-02-27 LAB — SARS-COV-2, COV2NT: NOT DETECTED

## 2021-03-01 ENCOUNTER — ANESTHESIA EVENT (OUTPATIENT)
Dept: SURGERY | Age: 66
End: 2021-03-01
Payer: MEDICARE

## 2021-03-02 ENCOUNTER — HOSPITAL ENCOUNTER (OUTPATIENT)
Age: 66
Setting detail: OBSERVATION
Discharge: HOME | End: 2021-03-03
Attending: ORTHOPAEDIC SURGERY | Admitting: ORTHOPAEDIC SURGERY
Payer: MEDICARE

## 2021-03-02 ENCOUNTER — APPOINTMENT (OUTPATIENT)
Dept: GENERAL RADIOLOGY | Age: 66
End: 2021-03-02
Attending: ORTHOPAEDIC SURGERY
Payer: MEDICARE

## 2021-03-02 ENCOUNTER — ANESTHESIA (OUTPATIENT)
Dept: SURGERY | Age: 66
End: 2021-03-02
Payer: MEDICARE

## 2021-03-02 DIAGNOSIS — Z98.890 S/P DISCECTOMY: Primary | ICD-10-CM

## 2021-03-02 PROCEDURE — 77030008771 HC TU NG SALEM SUMP -A: Performed by: ANESTHESIOLOGY

## 2021-03-02 PROCEDURE — 74011250636 HC RX REV CODE- 250/636: Performed by: ANESTHESIOLOGY

## 2021-03-02 PROCEDURE — 74011250636 HC RX REV CODE- 250/636: Performed by: NURSE ANESTHETIST, CERTIFIED REGISTERED

## 2021-03-02 PROCEDURE — 94760 N-INVAS EAR/PLS OXIMETRY 1: CPT

## 2021-03-02 PROCEDURE — 77030008462 HC STPLR SKN PROX J&J -A: Performed by: ORTHOPAEDIC SURGERY

## 2021-03-02 PROCEDURE — 77030040922 HC BLNKT HYPOTHRM STRY -A

## 2021-03-02 PROCEDURE — 77030029099 HC BN WAX SSPC -A: Performed by: ORTHOPAEDIC SURGERY

## 2021-03-02 PROCEDURE — 77030020263 HC SOL INJ SOD CL0.9% LFCR 1000ML: Performed by: ORTHOPAEDIC SURGERY

## 2021-03-02 PROCEDURE — 74011250636 HC RX REV CODE- 250/636: Performed by: ORTHOPAEDIC SURGERY

## 2021-03-02 PROCEDURE — 77030040179 HC DEV DRSG WND PICO S&N -C: Performed by: ORTHOPAEDIC SURGERY

## 2021-03-02 PROCEDURE — 76060000033 HC ANESTHESIA 1 TO 1.5 HR: Performed by: ORTHOPAEDIC SURGERY

## 2021-03-02 PROCEDURE — 99218 HC RM OBSERVATION: CPT

## 2021-03-02 PROCEDURE — 2709999900 HC NON-CHARGEABLE SUPPLY: Performed by: ORTHOPAEDIC SURGERY

## 2021-03-02 PROCEDURE — 77030034479 HC ADH SKN CLSR PRINEO J&J -B: Performed by: ORTHOPAEDIC SURGERY

## 2021-03-02 PROCEDURE — 77030003666 HC NDL SPINAL BD -A: Performed by: ORTHOPAEDIC SURGERY

## 2021-03-02 PROCEDURE — 76210000016 HC OR PH I REC 1 TO 1.5 HR: Performed by: ORTHOPAEDIC SURGERY

## 2021-03-02 PROCEDURE — 77030013079 HC BLNKT BAIR HGGR 3M -A: Performed by: ANESTHESIOLOGY

## 2021-03-02 PROCEDURE — 77030040356 HC CORD BPLR FRCP COVD -A: Performed by: ORTHOPAEDIC SURGERY

## 2021-03-02 PROCEDURE — 77030003029 HC SUT VCRL J&J -B: Performed by: ORTHOPAEDIC SURGERY

## 2021-03-02 PROCEDURE — 77030004402 HC BUR NEUR STRY -C: Performed by: ORTHOPAEDIC SURGERY

## 2021-03-02 PROCEDURE — 2709999900 HC NON-CHARGEABLE SUPPLY

## 2021-03-02 PROCEDURE — 74011250637 HC RX REV CODE- 250/637: Performed by: ORTHOPAEDIC SURGERY

## 2021-03-02 PROCEDURE — 77030033138 HC SUT PGA STRATFX J&J -B: Performed by: ORTHOPAEDIC SURGERY

## 2021-03-02 PROCEDURE — 77030038692 HC WND DEB SYS IRMX -B: Performed by: ORTHOPAEDIC SURGERY

## 2021-03-02 PROCEDURE — 77010033678 HC OXYGEN DAILY

## 2021-03-02 PROCEDURE — 74011000250 HC RX REV CODE- 250: Performed by: NURSE ANESTHETIST, CERTIFIED REGISTERED

## 2021-03-02 PROCEDURE — 77030041680 HC PNCL ELECSURG SMK EVAC CNMD -B: Performed by: ORTHOPAEDIC SURGERY

## 2021-03-02 PROCEDURE — 77030018723 HC ELCTRD BLD COVD -A: Performed by: ORTHOPAEDIC SURGERY

## 2021-03-02 PROCEDURE — 72020 X-RAY EXAM OF SPINE 1 VIEW: CPT

## 2021-03-02 PROCEDURE — 97530 THERAPEUTIC ACTIVITIES: CPT

## 2021-03-02 PROCEDURE — 97161 PT EVAL LOW COMPLEX 20 MIN: CPT

## 2021-03-02 PROCEDURE — 74011000250 HC RX REV CODE- 250: Performed by: ORTHOPAEDIC SURGERY

## 2021-03-02 PROCEDURE — 77030014647 HC SEAL FBRN TISSL BAXT -D: Performed by: ORTHOPAEDIC SURGERY

## 2021-03-02 PROCEDURE — 77030003028 HC SUT VCRL J&J -A: Performed by: ORTHOPAEDIC SURGERY

## 2021-03-02 PROCEDURE — 76010000161 HC OR TIME 1 TO 1.5 HR INTENSV-TIER 1: Performed by: ORTHOPAEDIC SURGERY

## 2021-03-02 PROCEDURE — 77030008684 HC TU ET CUF COVD -B: Performed by: ANESTHESIOLOGY

## 2021-03-02 PROCEDURE — 76000 FLUOROSCOPY <1 HR PHYS/QHP: CPT

## 2021-03-02 PROCEDURE — 77030022704 HC SUT VLOC COVD -B: Performed by: ORTHOPAEDIC SURGERY

## 2021-03-02 PROCEDURE — 77030026438 HC STYL ET INTUB CARD -A: Performed by: ANESTHESIOLOGY

## 2021-03-02 PROCEDURE — 77030040361 HC SLV COMPR DVT MDII -B: Performed by: ORTHOPAEDIC SURGERY

## 2021-03-02 RX ORDER — PANTOPRAZOLE SODIUM 40 MG/1
40 TABLET, DELAYED RELEASE ORAL
Status: DISCONTINUED | OUTPATIENT
Start: 2021-03-03 | End: 2021-03-03 | Stop reason: HOSPADM

## 2021-03-02 RX ORDER — DICLOFENAC SODIUM 10 MG/G
2 GEL TOPICAL AS NEEDED
Status: DISCONTINUED | OUTPATIENT
Start: 2021-03-02 | End: 2021-03-03 | Stop reason: HOSPADM

## 2021-03-02 RX ORDER — AMOXICILLIN 250 MG
1 CAPSULE ORAL 2 TIMES DAILY
Status: DISCONTINUED | OUTPATIENT
Start: 2021-03-02 | End: 2021-03-03 | Stop reason: HOSPADM

## 2021-03-02 RX ORDER — GLYCOPYRROLATE 0.2 MG/ML
INJECTION INTRAMUSCULAR; INTRAVENOUS AS NEEDED
Status: DISCONTINUED | OUTPATIENT
Start: 2021-03-02 | End: 2021-03-02 | Stop reason: HOSPADM

## 2021-03-02 RX ORDER — DULOXETIN HYDROCHLORIDE 30 MG/1
60 CAPSULE, DELAYED RELEASE ORAL 2 TIMES DAILY
Status: DISCONTINUED | OUTPATIENT
Start: 2021-03-02 | End: 2021-03-03 | Stop reason: HOSPADM

## 2021-03-02 RX ORDER — SODIUM CHLORIDE 9 MG/ML
125 INJECTION, SOLUTION INTRAVENOUS CONTINUOUS
Status: DISPENSED | OUTPATIENT
Start: 2021-03-02 | End: 2021-03-03

## 2021-03-02 RX ORDER — FENTANYL CITRATE 50 UG/ML
50 INJECTION, SOLUTION INTRAMUSCULAR; INTRAVENOUS AS NEEDED
Status: DISCONTINUED | OUTPATIENT
Start: 2021-03-02 | End: 2021-03-02 | Stop reason: HOSPADM

## 2021-03-02 RX ORDER — SODIUM CHLORIDE 0.9 % (FLUSH) 0.9 %
5-40 SYRINGE (ML) INJECTION EVERY 8 HOURS
Status: DISCONTINUED | OUTPATIENT
Start: 2021-03-02 | End: 2021-03-02 | Stop reason: HOSPADM

## 2021-03-02 RX ORDER — NALOXONE HYDROCHLORIDE 0.4 MG/ML
0.4 INJECTION, SOLUTION INTRAMUSCULAR; INTRAVENOUS; SUBCUTANEOUS AS NEEDED
Status: DISCONTINUED | OUTPATIENT
Start: 2021-03-02 | End: 2021-03-03 | Stop reason: HOSPADM

## 2021-03-02 RX ORDER — TRAZODONE HYDROCHLORIDE 100 MG/1
100 TABLET ORAL
Status: DISCONTINUED | OUTPATIENT
Start: 2021-03-02 | End: 2021-03-03 | Stop reason: HOSPADM

## 2021-03-02 RX ORDER — POLYETHYLENE GLYCOL 3350 17 G/17G
17 POWDER, FOR SOLUTION ORAL DAILY
Status: DISCONTINUED | OUTPATIENT
Start: 2021-03-02 | End: 2021-03-03 | Stop reason: HOSPADM

## 2021-03-02 RX ORDER — KETOROLAC TROMETHAMINE 30 MG/ML
15 INJECTION, SOLUTION INTRAMUSCULAR; INTRAVENOUS EVERY 6 HOURS
Status: COMPLETED | OUTPATIENT
Start: 2021-03-02 | End: 2021-03-03

## 2021-03-02 RX ORDER — ONDANSETRON 2 MG/ML
4 INJECTION INTRAMUSCULAR; INTRAVENOUS AS NEEDED
Status: DISCONTINUED | OUTPATIENT
Start: 2021-03-02 | End: 2021-03-02 | Stop reason: HOSPADM

## 2021-03-02 RX ORDER — SODIUM CHLORIDE 0.9 % (FLUSH) 0.9 %
5-40 SYRINGE (ML) INJECTION AS NEEDED
Status: DISCONTINUED | OUTPATIENT
Start: 2021-03-02 | End: 2021-03-02 | Stop reason: HOSPADM

## 2021-03-02 RX ORDER — SODIUM CHLORIDE, SODIUM LACTATE, POTASSIUM CHLORIDE, CALCIUM CHLORIDE 600; 310; 30; 20 MG/100ML; MG/100ML; MG/100ML; MG/100ML
25 INJECTION, SOLUTION INTRAVENOUS CONTINUOUS
Status: DISCONTINUED | OUTPATIENT
Start: 2021-03-02 | End: 2021-03-02 | Stop reason: HOSPADM

## 2021-03-02 RX ORDER — ROPIVACAINE HYDROCHLORIDE 5 MG/ML
INJECTION, SOLUTION EPIDURAL; INFILTRATION; PERINEURAL AS NEEDED
Status: DISCONTINUED | OUTPATIENT
Start: 2021-03-02 | End: 2021-03-02 | Stop reason: HOSPADM

## 2021-03-02 RX ORDER — EPHEDRINE SULFATE/0.9% NACL/PF 50 MG/5 ML
SYRINGE (ML) INTRAVENOUS AS NEEDED
Status: DISCONTINUED | OUTPATIENT
Start: 2021-03-02 | End: 2021-03-02 | Stop reason: HOSPADM

## 2021-03-02 RX ORDER — HYDROXYZINE HYDROCHLORIDE 10 MG/1
10 TABLET, FILM COATED ORAL
Status: DISCONTINUED | OUTPATIENT
Start: 2021-03-02 | End: 2021-03-03 | Stop reason: HOSPADM

## 2021-03-02 RX ORDER — DIPHENHYDRAMINE HCL 25 MG
100 CAPSULE ORAL 2 TIMES DAILY
Status: DISCONTINUED | OUTPATIENT
Start: 2021-03-02 | End: 2021-03-03 | Stop reason: HOSPADM

## 2021-03-02 RX ORDER — ACETAMINOPHEN 500 MG
1000 TABLET ORAL ONCE
Status: COMPLETED | OUTPATIENT
Start: 2021-03-02 | End: 2021-03-02

## 2021-03-02 RX ORDER — HYDROMORPHONE HYDROCHLORIDE 1 MG/ML
0.2 INJECTION, SOLUTION INTRAMUSCULAR; INTRAVENOUS; SUBCUTANEOUS
Status: DISCONTINUED | OUTPATIENT
Start: 2021-03-02 | End: 2021-03-02 | Stop reason: HOSPADM

## 2021-03-02 RX ORDER — MELATONIN
5000 DAILY
Status: DISCONTINUED | OUTPATIENT
Start: 2021-03-02 | End: 2021-03-03 | Stop reason: HOSPADM

## 2021-03-02 RX ORDER — LIDOCAINE HYDROCHLORIDE 10 MG/ML
0.1 INJECTION, SOLUTION EPIDURAL; INFILTRATION; INTRACAUDAL; PERINEURAL AS NEEDED
Status: DISCONTINUED | OUTPATIENT
Start: 2021-03-02 | End: 2021-03-02 | Stop reason: HOSPADM

## 2021-03-02 RX ORDER — PHENYLEPHRINE HCL IN 0.9% NACL 0.4MG/10ML
SYRINGE (ML) INTRAVENOUS AS NEEDED
Status: DISCONTINUED | OUTPATIENT
Start: 2021-03-02 | End: 2021-03-02 | Stop reason: HOSPADM

## 2021-03-02 RX ORDER — CYCLOBENZAPRINE HCL 10 MG
10 TABLET ORAL
Status: DISCONTINUED | OUTPATIENT
Start: 2021-03-02 | End: 2021-03-03 | Stop reason: HOSPADM

## 2021-03-02 RX ORDER — CEPHALEXIN 250 MG/1
500 CAPSULE ORAL 2 TIMES DAILY
Status: DISCONTINUED | OUTPATIENT
Start: 2021-03-03 | End: 2021-03-03 | Stop reason: HOSPADM

## 2021-03-02 RX ORDER — HYDROMORPHONE HYDROCHLORIDE 1 MG/ML
1 INJECTION, SOLUTION INTRAMUSCULAR; INTRAVENOUS; SUBCUTANEOUS
Status: ACTIVE | OUTPATIENT
Start: 2021-03-02 | End: 2021-03-03

## 2021-03-02 RX ORDER — SODIUM CHLORIDE 0.9 % (FLUSH) 0.9 %
5-40 SYRINGE (ML) INJECTION EVERY 8 HOURS
Status: DISCONTINUED | OUTPATIENT
Start: 2021-03-02 | End: 2021-03-03 | Stop reason: HOSPADM

## 2021-03-02 RX ORDER — FENTANYL CITRATE 50 UG/ML
INJECTION, SOLUTION INTRAMUSCULAR; INTRAVENOUS AS NEEDED
Status: DISCONTINUED | OUTPATIENT
Start: 2021-03-02 | End: 2021-03-02 | Stop reason: HOSPADM

## 2021-03-02 RX ORDER — FENTANYL CITRATE 50 UG/ML
25 INJECTION, SOLUTION INTRAMUSCULAR; INTRAVENOUS
Status: DISCONTINUED | OUTPATIENT
Start: 2021-03-02 | End: 2021-03-02 | Stop reason: HOSPADM

## 2021-03-02 RX ORDER — DEXAMETHASONE SODIUM PHOSPHATE 4 MG/ML
INJECTION, SOLUTION INTRA-ARTICULAR; INTRALESIONAL; INTRAMUSCULAR; INTRAVENOUS; SOFT TISSUE AS NEEDED
Status: DISCONTINUED | OUTPATIENT
Start: 2021-03-02 | End: 2021-03-02 | Stop reason: HOSPADM

## 2021-03-02 RX ORDER — GABAPENTIN 300 MG/1
600 CAPSULE ORAL 3 TIMES DAILY
Status: DISCONTINUED | OUTPATIENT
Start: 2021-03-02 | End: 2021-03-03 | Stop reason: HOSPADM

## 2021-03-02 RX ORDER — NEOSTIGMINE METHYLSULFATE 1 MG/ML
INJECTION, SOLUTION INTRAVENOUS AS NEEDED
Status: DISCONTINUED | OUTPATIENT
Start: 2021-03-02 | End: 2021-03-02 | Stop reason: HOSPADM

## 2021-03-02 RX ORDER — ONDANSETRON 2 MG/ML
4 INJECTION INTRAMUSCULAR; INTRAVENOUS
Status: ACTIVE | OUTPATIENT
Start: 2021-03-02 | End: 2021-03-03

## 2021-03-02 RX ORDER — DIPHENOXYLATE HYDROCHLORIDE AND ATROPINE SULFATE 2.5; .025 MG/1; MG/1
1 TABLET ORAL
Status: DISCONTINUED | OUTPATIENT
Start: 2021-03-02 | End: 2021-03-03 | Stop reason: HOSPADM

## 2021-03-02 RX ORDER — ATORVASTATIN CALCIUM 10 MG/1
10 TABLET, FILM COATED ORAL
Status: DISCONTINUED | OUTPATIENT
Start: 2021-03-02 | End: 2021-03-03 | Stop reason: HOSPADM

## 2021-03-02 RX ORDER — PROPOFOL 10 MG/ML
INJECTION, EMULSION INTRAVENOUS AS NEEDED
Status: DISCONTINUED | OUTPATIENT
Start: 2021-03-02 | End: 2021-03-02 | Stop reason: HOSPADM

## 2021-03-02 RX ORDER — MIDAZOLAM HYDROCHLORIDE 1 MG/ML
INJECTION, SOLUTION INTRAMUSCULAR; INTRAVENOUS AS NEEDED
Status: DISCONTINUED | OUTPATIENT
Start: 2021-03-02 | End: 2021-03-02 | Stop reason: HOSPADM

## 2021-03-02 RX ORDER — DIAZEPAM 5 MG/1
5 TABLET ORAL
Status: DISCONTINUED | OUTPATIENT
Start: 2021-03-02 | End: 2021-03-03 | Stop reason: HOSPADM

## 2021-03-02 RX ORDER — CEPHALEXIN 250 MG/1
500 CAPSULE ORAL 2 TIMES DAILY
Status: DISCONTINUED | OUTPATIENT
Start: 2021-03-02 | End: 2021-03-02

## 2021-03-02 RX ORDER — FACIAL-BODY WIPES
10 EACH TOPICAL DAILY PRN
Status: DISCONTINUED | OUTPATIENT
Start: 2021-03-04 | End: 2021-03-03 | Stop reason: HOSPADM

## 2021-03-02 RX ORDER — ACETAMINOPHEN 325 MG/1
650 TABLET ORAL ONCE
Status: DISCONTINUED | OUTPATIENT
Start: 2021-03-02 | End: 2021-03-02 | Stop reason: HOSPADM

## 2021-03-02 RX ORDER — ONDANSETRON 2 MG/ML
INJECTION INTRAMUSCULAR; INTRAVENOUS AS NEEDED
Status: DISCONTINUED | OUTPATIENT
Start: 2021-03-02 | End: 2021-03-02 | Stop reason: HOSPADM

## 2021-03-02 RX ORDER — PREGABALIN 75 MG/1
150 CAPSULE ORAL ONCE
Status: COMPLETED | OUTPATIENT
Start: 2021-03-02 | End: 2021-03-02

## 2021-03-02 RX ORDER — ROCURONIUM BROMIDE 10 MG/ML
INJECTION, SOLUTION INTRAVENOUS AS NEEDED
Status: DISCONTINUED | OUTPATIENT
Start: 2021-03-02 | End: 2021-03-02 | Stop reason: HOSPADM

## 2021-03-02 RX ORDER — ACETAMINOPHEN 500 MG
1000 TABLET ORAL EVERY 6 HOURS
Status: DISCONTINUED | OUTPATIENT
Start: 2021-03-02 | End: 2021-03-03 | Stop reason: HOSPADM

## 2021-03-02 RX ORDER — SODIUM CHLORIDE 0.9 % (FLUSH) 0.9 %
5-40 SYRINGE (ML) INJECTION AS NEEDED
Status: DISCONTINUED | OUTPATIENT
Start: 2021-03-02 | End: 2021-03-03 | Stop reason: HOSPADM

## 2021-03-02 RX ORDER — FAMOTIDINE 20 MG/1
20 TABLET, FILM COATED ORAL 2 TIMES DAILY
Status: DISCONTINUED | OUTPATIENT
Start: 2021-03-02 | End: 2021-03-03 | Stop reason: HOSPADM

## 2021-03-02 RX ORDER — OXYCODONE HYDROCHLORIDE 5 MG/1
5 TABLET ORAL
Status: DISCONTINUED | OUTPATIENT
Start: 2021-03-02 | End: 2021-03-03 | Stop reason: HOSPADM

## 2021-03-02 RX ORDER — MIDAZOLAM HYDROCHLORIDE 1 MG/ML
1 INJECTION, SOLUTION INTRAMUSCULAR; INTRAVENOUS AS NEEDED
Status: DISCONTINUED | OUTPATIENT
Start: 2021-03-02 | End: 2021-03-02 | Stop reason: HOSPADM

## 2021-03-02 RX ORDER — OXYCODONE HYDROCHLORIDE 5 MG/1
10-15 TABLET ORAL
Status: DISCONTINUED | OUTPATIENT
Start: 2021-03-02 | End: 2021-03-03 | Stop reason: HOSPADM

## 2021-03-02 RX ORDER — SODIUM CHLORIDE, SODIUM LACTATE, POTASSIUM CHLORIDE, CALCIUM CHLORIDE 600; 310; 30; 20 MG/100ML; MG/100ML; MG/100ML; MG/100ML
50 INJECTION, SOLUTION INTRAVENOUS CONTINUOUS
Status: DISCONTINUED | OUTPATIENT
Start: 2021-03-02 | End: 2021-03-02 | Stop reason: HOSPADM

## 2021-03-02 RX ORDER — LIDOCAINE HYDROCHLORIDE 20 MG/ML
INJECTION, SOLUTION EPIDURAL; INFILTRATION; INTRACAUDAL; PERINEURAL AS NEEDED
Status: DISCONTINUED | OUTPATIENT
Start: 2021-03-02 | End: 2021-03-02 | Stop reason: HOSPADM

## 2021-03-02 RX ADMIN — CEFAZOLIN SODIUM 2 G: 1 INJECTION, POWDER, FOR SOLUTION INTRAMUSCULAR; INTRAVENOUS at 17:45

## 2021-03-02 RX ADMIN — Medication 120 MCG: at 08:04

## 2021-03-02 RX ADMIN — KETOROLAC TROMETHAMINE 15 MG: 30 INJECTION, SOLUTION INTRAMUSCULAR at 23:33

## 2021-03-02 RX ADMIN — SODIUM CHLORIDE 125 ML/HR: 9 INJECTION, SOLUTION INTRAVENOUS at 09:30

## 2021-03-02 RX ADMIN — ROCURONIUM BROMIDE 30 MG: 10 INJECTION INTRAVENOUS at 07:46

## 2021-03-02 RX ADMIN — Medication 3 MG: at 08:46

## 2021-03-02 RX ADMIN — PREGABALIN 150 MG: 75 CAPSULE ORAL at 07:25

## 2021-03-02 RX ADMIN — FENTANYL CITRATE 50 MCG: 50 INJECTION, SOLUTION INTRAMUSCULAR; INTRAVENOUS at 07:45

## 2021-03-02 RX ADMIN — Medication 10 ML: at 13:29

## 2021-03-02 RX ADMIN — TRAZODONE HYDROCHLORIDE 100 MG: 100 TABLET ORAL at 21:44

## 2021-03-02 RX ADMIN — LIDOCAINE HYDROCHLORIDE 80 MG: 20 INJECTION, SOLUTION EPIDURAL; INFILTRATION; INTRACAUDAL; PERINEURAL at 07:45

## 2021-03-02 RX ADMIN — DULOXETINE HYDROCHLORIDE 60 MG: 30 CAPSULE, DELAYED RELEASE ORAL at 12:00

## 2021-03-02 RX ADMIN — DOCUSATE SODIUM 50MG AND SENNOSIDES 8.6MG 1 TABLET: 8.6; 5 TABLET, FILM COATED ORAL at 11:59

## 2021-03-02 RX ADMIN — GABAPENTIN 600 MG: 300 CAPSULE ORAL at 17:45

## 2021-03-02 RX ADMIN — KETOROLAC TROMETHAMINE 15 MG: 30 INJECTION, SOLUTION INTRAMUSCULAR at 12:00

## 2021-03-02 RX ADMIN — Medication 80 MCG: at 08:40

## 2021-03-02 RX ADMIN — Medication 120 MCG: at 08:20

## 2021-03-02 RX ADMIN — Medication 10 ML: at 21:45

## 2021-03-02 RX ADMIN — ACETAMINOPHEN 1000 MG: 500 TABLET ORAL at 12:00

## 2021-03-02 RX ADMIN — Medication 80 MCG: at 08:02

## 2021-03-02 RX ADMIN — ACETAMINOPHEN 1000 MG: 500 TABLET ORAL at 07:26

## 2021-03-02 RX ADMIN — KETOROLAC TROMETHAMINE 15 MG: 30 INJECTION, SOLUTION INTRAMUSCULAR at 17:45

## 2021-03-02 RX ADMIN — ACETAMINOPHEN 1000 MG: 500 TABLET ORAL at 17:45

## 2021-03-02 RX ADMIN — DEXAMETHASONE SODIUM PHOSPHATE 4 MG: 4 INJECTION, SOLUTION INTRAMUSCULAR; INTRAVENOUS at 07:55

## 2021-03-02 RX ADMIN — POLYETHYLENE GLYCOL 3350 17 G: 17 POWDER, FOR SOLUTION ORAL at 11:59

## 2021-03-02 RX ADMIN — GLYCOPYRROLATE 0.4 MG: 0.2 INJECTION, SOLUTION INTRAMUSCULAR; INTRAVENOUS at 08:46

## 2021-03-02 RX ADMIN — GABAPENTIN 600 MG: 300 CAPSULE ORAL at 21:44

## 2021-03-02 RX ADMIN — FENTANYL CITRATE 50 MCG: 50 INJECTION, SOLUTION INTRAMUSCULAR; INTRAVENOUS at 08:30

## 2021-03-02 RX ADMIN — FAMOTIDINE 20 MG: 20 TABLET ORAL at 17:45

## 2021-03-02 RX ADMIN — MIDAZOLAM HYDROCHLORIDE 2 MG: 1 INJECTION, SOLUTION INTRAMUSCULAR; INTRAVENOUS at 07:34

## 2021-03-02 RX ADMIN — DULOXETINE HYDROCHLORIDE 60 MG: 30 CAPSULE, DELAYED RELEASE ORAL at 17:45

## 2021-03-02 RX ADMIN — WATER 2 G: 1 INJECTION INTRAMUSCULAR; INTRAVENOUS; SUBCUTANEOUS at 07:40

## 2021-03-02 RX ADMIN — Medication 1 AMPULE: at 11:18

## 2021-03-02 RX ADMIN — Medication 5000 UNITS: at 12:00

## 2021-03-02 RX ADMIN — ONDANSETRON HYDROCHLORIDE 4 MG: 2 INJECTION, SOLUTION INTRAMUSCULAR; INTRAVENOUS at 08:31

## 2021-03-02 RX ADMIN — DOCUSATE SODIUM 50MG AND SENNOSIDES 8.6MG 1 TABLET: 8.6; 5 TABLET, FILM COATED ORAL at 17:45

## 2021-03-02 RX ADMIN — Medication 1 AMPULE: at 21:47

## 2021-03-02 RX ADMIN — Medication 120 MCG: at 07:52

## 2021-03-02 RX ADMIN — Medication 80 MCG: at 07:49

## 2021-03-02 RX ADMIN — CEFAZOLIN SODIUM 2 G: 1 INJECTION, POWDER, FOR SOLUTION INTRAMUSCULAR; INTRAVENOUS at 23:33

## 2021-03-02 RX ADMIN — FAMOTIDINE 20 MG: 20 TABLET ORAL at 11:59

## 2021-03-02 RX ADMIN — PROPOFOL 140 MG: 10 INJECTION, EMULSION INTRAVENOUS at 07:45

## 2021-03-02 RX ADMIN — SODIUM CHLORIDE, POTASSIUM CHLORIDE, SODIUM LACTATE AND CALCIUM CHLORIDE 25 ML/HR: 600; 310; 30; 20 INJECTION, SOLUTION INTRAVENOUS at 07:26

## 2021-03-02 RX ADMIN — Medication 3 AMPULE: at 07:26

## 2021-03-02 RX ADMIN — Medication 10 MG: at 08:36

## 2021-03-02 RX ADMIN — Medication 80 MCG: at 08:24

## 2021-03-02 RX ADMIN — ATORVASTATIN CALCIUM 10 MG: 10 TABLET, FILM COATED ORAL at 21:44

## 2021-03-02 RX ADMIN — ACETAMINOPHEN 1000 MG: 500 TABLET ORAL at 23:33

## 2021-03-02 RX ADMIN — Medication 120 MCG: at 08:10

## 2021-03-02 NOTE — BRIEF OP NOTE
Brief Postoperative Note    Patient: Isaak Espino  YOB: 1955  MRN: [id number]    Date of Procedure: 3/2/2021     Pre-Op Diagnosis: SPONDYLOSIS/CHRONIC LOW BACK PAIN WITH SCIATICA/STATUS POST SPINAL FUSION/  FIBROMYALOGIA/DISPLACEMENT LUMBAR INTERVERTEBRAL DISC    Post-Op Diagnosis: Same as preoperative diagnosis.       Procedure(s):  LEFT L5-S1 DISCECTOMY    Surgeon(s):  Chilango Guerra MD    Surgical Assistant: Physician Assistant: LAVELL Rodriguez    Anesthesia: General     Estimated Blood Loss (mL): less than 50     Complications: None    Specimens: * No specimens in log *     Implants: * No implants in log *    Drains: * No LDAs found *    Findings: HNP    Electronically Signed by Pradeep Munoz MD on 3/2/2021 at 8:40 AM

## 2021-03-02 NOTE — OP NOTES
[address]  OPERATIVE REPORT    Name:  Rosario Barr  MR#:  [id number]  :  1955  ACCOUNT #:  [de-identified]  DATE OF SERVICE:  2021    PREOPERATIVE DIAGNOSES:  1. Lumbar disk herniation, L5-S1.  2.  Lumbago. 3.  Sciatica. 4.  Lumbar spondylosis with radiculopathy. POSTOPERATIVE DIAGNOSES:  1. Lumbar disk herniation, L5-S1.  2.  Lumbago. 3.  Sciatica. 4.  Lumbar spondylosis with radiculopathy. PROCEDURE PERFORMED:  1. L5-S1 microdiscectomy. 2.  Use of operative microscope. SURGEON:  Launie Klinefelter, MD    ASSISTANT:  LAVELL Kan  There was the need for a surgical assistant on this case to assist with:  1) Safe and proper retraction. 2) Maintenance of visualization during surgery by helping with suctioning. 3) Multiple tasks throughout the decompression and instrumentation to allow for timely and safe completion of the procedure. 4) Overall assistance helped decrease the risk of complications and infection. ANESTHESIA:  General.    COMPLICATIONS:  None. SPECIMENS REMOVED:  None. IMPLANTS:  None. ESTIMATED BLOOD LOSS:  50 mL. DRAINS:  None. INDICATIONS FOR PROCEDURE:  The patient is a 51-year-old lady with lumbar disk herniation, lumbago, and sciatica. She has failed to improve with nonoperative treatment, and at this point, would like to proceed with surgical intervention. I have given her warnings about the possible complications including, but not limited to, pain, scar, bleeding, infection, nonunion, damage to surrounding structures, death, paralysis, blindness, and stroke. She understands and wants to proceed. PROCEDURE:  After informed consent was obtained and the operative site was properly marked, the patient was moved back to operating room and underwent endotracheal anesthesia. She was positioned prone on the operating table using the [employer] frame.   Her arms were placed in a 90:90 position and knees were gently bent with pillows. Fluoroscopy was used to jamee the level of the incision. We then proceeded to prep and drape in the usual manner. Time-out was obtained verifying that this was the correct patient, the correct surgery, the correct site as well as that she had received IV antibiotics within 30 minutes of the incision. I then proceeded to perform a standard posterior approach to lumbar spine on the left side at L5-S1. I was able to use fluoroscopy to verify that we were in the correct level and then proceeded to use a high-speed drill to perform a U-cut laminectomy at L4 and a J-cut at L5 and removed the intervening bone with a pituitary. Once that was removed, I proceeded to detach ligamentum flavum from superior leading edge, flipped it into bony defect, and removed with a Kerrison #3. I found the nerve root and thecal sac, retracted those medially, and found the disk herniation distal to the disk anterior to the nerve root. The disk herniation was removed with a micropituitary. Epidural space was inspected with a micro nerve hook. Please note that this was under microscopic visualization. Once we noticed that there was no further nerve compression, I proceeded to irrigate the wound with normal saline, closed the fascia with #1 Vicryl followed by irrigating subcutaneous, closed subcutaneous with 2-0 Vicryl and skin with a 3-0 running Monocryl and Dermabond. Sterile dressing was applied. The patient was then awakened and transferred to PACU in stable condition. POSTOPERATIVE PLAN:  The patient is going to remain here overnight. We are going to give her SCDs and RAJ hoses for DVT prophylaxis and Ancef for infection prophylaxis.         MD DONYA [email or web address]  D:  03/02/2021 12:35  T:  03/02/2021 15:09  JOB #:  [id number]  CC:  [facility] Billing Office       Tigist Glass NP

## 2021-03-02 NOTE — PERIOP NOTES
0905-Handoff Report from Operating Room to PACU    Report received from [address] and Silvio Nichols CRNA regarding Reji Munoz. Surgeon(s):  Socorro Aburto MD  And Procedure(s) (LRB):  LEFT L5-S1 DISCECTOMY (Left)  confirmed   with allergies and dressings discussed. Anesthesia type, drugs, patient history, complications, estimated blood loss, vital signs, intake and output, and last pain medication, lines, reversal medications and temperature were reviewed. 1000- No family in waiting area at this time, called number provided for pt's  but phone went straight to voicemail. Will try back later for update. 1025- TRANSFER - OUT REPORT:    Verbal report given to [facility] RN(name) on Reji Munoz  being transferred to ortho(unit) for routine post - op       Report consisted of patients Situation, Background, Assessment and   Recommendations(SBAR). Information from the following report(s) SBAR, Kardex, OR Summary, Procedure Summary, Intake/Output, MAR and Recent Results was reviewed with the receiving nurse. Opportunity for questions and clarification was provided. Patient transported with:   O2 @ 3 liters  Tech    1030-  updated on room number and pt status.

## 2021-03-02 NOTE — PERIOP NOTES
Patient: Placido Oakes MRN: [id number]  SSN: [id number]   YOB: 1955  Age: 63 y.o. Sex: female     Patient is status post Procedure(s):  LEFT L5-S1 DISCECTOMY. Surgeon(s) and Role:     * Bryan Solis MD - Primary    Local/Dose/Irrigation:  50ml 0.5% ropivicaine  450ml Irrisept used prn for irrigation from sterile field.                     Peripheral IV 03/02/21 Left Hand (Active)   Site Assessment Clean, dry, & intact 03/02/21 0717   Phlebitis Assessment 0 03/02/21 0717   Infiltration Assessment 0 03/02/21 0717   Dressing Status Occlusive;New 03/02/21 0717   Dressing Type Transparent;Tape 03/02/21 0717   Hub Color/Line Status Infusing;Pink 03/02/21 0717            Airway - Endotracheal Tube 03/02/21 Oral (Active)   Line Ze Lips 03/02/21 0000                   Dressing/Packing:  Incision 03/02/21 Back-Dressing/Treatment: (CORWIN) (03/02/21 0268)    Splint/Cast:  ]

## 2021-03-02 NOTE — DISCHARGE INSTRUCTIONS
[address]    Discharge Instruction Sheet: Lumbar Laminectomy    DR. Chikis Farrar    Pain control:  Typically, we will prescribe a narcotic usually 1-2 tabs every four hours is sufficient for the pain. Most patients need this only for the first few weeks. You should discontinue this as the pain decreases. You should not drive while taking any narcotic pain medications. Constipation  Pain medicines and anesthesia can be constipating-this can be prevented by gentle physical activity and drinking plenty of fluid. It should be treated with over-the-counter medications such as Miralax or suppositories, and/or Fleets enema. You should have a bowel movement at least every other day following surgery. Incision care  Keep this area clean and dry. Your dressing is designed to stay in place for 5-7 days. You will be sent home with one additional dressing to change at that time. Leave this new dressing in place until our follow up visit in the office in about 10-14 days. If staples are in place, they should be removed about 14-20 days after surgery. DO NOT take a tub bath or go swimming until cleared by your doctor. DO NOT apply lotions, oils, or creams to incision. Cover the wound with an impermiable dressing to shower for then next 5 days, then no cover is needed. To increase and promote healing:   Stop Smoking (or at least cut back on smoking).  Eat a well-balanced diet (high in protein and vitamin C)   If your appetite is poor, consider nutritional supplements like Ensure, Glucerna, or McGrath Instant Breakfast.   If you are diabetic, controlling you blood sugars is very important to prevent infection and promote wound healing. Nutrition:   If you were on a supplement such as Ensure or Glucerna) while in the hospital, please continue using them with each meal for the next 30 days.    Eat a well-balanced diet - High in protein, high in vitamins and minerals, especially vitamin C and zinc.     Restrictions:  Limited bending at waist  Lift no more than 10 pounds    Warning signs : Please call your physician immediately at [phone] if you have   Bleeding from incision that is constant.  Change in mental status (unusual behavior or confusion)   If your incision develops redness or swelling   Change in wound drainage (increase in amount, color, or foul odor)   [place] over 101.5 degrees Fahrenheit    Headache that is not relieved with pain medication   Tenderness or redness in the calf of your leg    Emergency: CALL 100 if you have   Shortness of breath   Chest pain   Localized chest pain when coughing or taking a deep breath    Follow-up  Please call Dr. Mathieu Fatima office for a follow up appointment in 2-3 weeks at [phone]. You can return to work when cleared by a physician. During normal business hours you may reach Dr. Rosa Perez' team directly at [phone] if you have concerns or questions.     Rk Camarillo

## 2021-03-02 NOTE — PERIOP NOTES
07: 09=COVID19 test results negative; pt states she has not been exposed or had any s/s virus; has not had vaccine. Warming blanket in place and on.

## 2021-03-02 NOTE — PROGRESS NOTES
Ortho/ NeuroSurgery NP Note    POD# 0  s/p LEFT L5-S1 DISCECTOMY     Pt resting in bed. Asleep arouses to stimulus and quickly drifts back to sleep  Maintaining airway, cont pulse ox if 96% on 2 LPM NC  VSS Afebrile. Patient has not had something to eat/drink. No nausea. Most Recent Labs:   Lab Results   Component Value Date/Time    HGB 14.1 02/23/2021 01:45 PM    Hemoglobin A1c 5.7 (H) 02/23/2021 01:45 PM       Body mass index is 27.62 kg/m². Reference: BMI greater than 30 is classified as obesity and greater than 40 is classified as morbid obesity. Voiding status: need to void  Dressing c.d.i    Calves soft and supple; No pain with passive stretch  Sensation and motor intact  SCDs for mechanical DVT proph    Plan:  1) PT BID starting today  2) Chrissie-op Antibiotics Ancef also home Keflex was resumed. Will hold this until Ancef completed and then resume Keflex for Pre-op POA UTI Proteus  3) Pain control - use cautiously until more alert and awake  5) Discharge plans to home when clears PT and voiding. Probably tomorrow    Readiness for discharge:     [x] Vital Signs stable    [x] Hgb stable    [] + Voiding    [x] Incision intact, drainage minimal    [] Tolerating PO intake     [] Cleared by PT (OT if applicable)     [] Stair training completed (if applicable)    [] Independent/Contact Guard ambulation with assistive device (household distance)     [] Bed mobility     [] Car transfers     [] ADLs    [] Adequate pain control on oral medication alone    Home Wednesday?     Isadora Sierra, NP Awake/Alert

## 2021-03-02 NOTE — PROGRESS NOTES
Problem: Mobility Impaired (Adult and Pediatric)  Goal: *Acute Goals and Plan of Care (Insert Text)  Description: FUNCTIONAL STATUS PRIOR TO ADMISSION: Patient was modified independent using a single point cane for functional mobility. HOME SUPPORT PRIOR TO ADMISSION: The patient lived with spouse but did not require assist.    Physical Therapy Goals  Initiated 3/2/2021    1. Patient will move from supine to sit and sit to supine  in bed with modified independence within 4 days. 2. Patient will perform sit to stand with modified independence within 4 days. 3. Patient will ambulate with modified independence for 300 feet with the least restrictive device within 4 days. 4. Patient will ascend/descend 4 stairs with single handrail(s) with supervision/set-up within 4 days. 5. Patient will verbalize and demonstrate understanding of spinal precautions (No bending, lifting greater than 5 lbs, or twisting; log-roll technique; frequent repositioning as instructed) within 4 days. Outcome: Progressing Towards Goal   PHYSICAL THERAPY EVALUATION  Patient: Mary De Leon (89 y.o. female)  Date: 3/2/2021  Primary Diagnosis: HNP (herniated nucleus pulposus), lumbar [M51.26]  Procedure(s) (LRB):  LEFT L5-S1 DISCECTOMY (Left) Day of Surgery   Precautions:   Fall, Back      ASSESSMENT  Based on the objective data described below, the patient presents with generalized weakness, decreased functional mobility, impaired balance and gait, minimal c/o low back pain and increased lethargy s/p left L5-S1 discectomy, POD #0. Pt received supine in bed, sleeping soundly and difficult to maintain arousal. Pt required min A for log rolling and to assume sitting EOB position. Pt performed sit<>stand with min A and able to side step along bedside. No buckling or instability noted. Due to increased lethargy, further gait deferred. Anticipate once patient's alertness improves, pt will progress well with acute therapies.  Recommend home with HHPT versus no needs pending progress . Current Level of Function Impacting Discharge (mobility/balance): min A bed mobility, transfers, and gait training x 5 ft HHA    Functional Outcome Measure: The patient scored Total Score: 17/28 on the Tinetti outcome measure which is indicative of high fall risk. Other factors to consider for discharge: increased lethargy post-op     Patient will benefit from skilled therapy intervention to address the above noted impairments. PLAN :  Recommendations and Planned Interventions: bed mobility training, transfer training, gait training, therapeutic exercises, neuromuscular re-education, patient and family training/education, and therapeutic activities      Frequency/Duration: Patient will be followed by physical therapy:  twice daily to address goals. Recommendation for discharge: (in order for the patient to meet his/her long term goals)  Physical therapy at least 2 days/week in the home AND ensure assist and/or supervision for safety with mobility    This discharge recommendation:  Has been made in collaboration with the attending provider and/or case management    IF patient discharges home will need the following DME: patient owns DME required for discharge         SUBJECTIVE:   Patient stated I'm so tired.       RN reported pt took a benadryl prior to surgery and could be contributing to her lethargy and difficult staying awake    OBJECTIVE DATA SUMMARY:   HISTORY:    Past Medical History:   Diagnosis Date    Arthritis     RHEUMATOID AND OSTEOARTHRITIS    Breast cancer ([facility] [address] 2008    right    C. difficile diarrhea 2004    as of 10/23/14 pt denies any diarrhea or sx    Cancer ([facility] 1982    uterine CA, hysterectomy, no chemo / radiation    Cancer ([facility] [address] 2008    Rt BR; lumpectomy, radiation with chemo pill x6 yrs    Fibromyalgia     GERD (gastroesophageal reflux disease)     Hypercholesteremia     Neuropathy     Osteoarthritis     Other ill-defined conditions(799.89)     GRANULOMA ANEULARE, SKIN DISORDER    Psychiatric disorder     ANXIETY    Rheumatoid arthritis ([facility] [address]     S/P radiation therapy 2008    right breast    Seasonal allergies      Past Surgical History:   Procedure Laterality Date    COLONOSCOPY N/A 5/25/2018    COLONOSCOPY performed by Pradip Newsome MD at [facility] ENDOSCOPY    HX APPENDECTOMY      HX CARPAL TUNNEL RELEASE Bilateral     HX CERVICAL DISKECTOMY  2/3/14    C4-5     HX COLONOSCOPY      HX GI      ENDOSCOPY (DILATATION/ESOPH)    HX HYSTERECTOMY  1982    partial    HX KNEE ARTHROSCOPY Left     x2    HX LUMBAR DISKECTOMY  06/2018    HX MOHS PROCEDURES Right     HX ORTHOPAEDIC  2005, 2011    CERVICAL NECKx 3    HX ORTHOPAEDIC Bilateral 1988    carpal tunnel repair, and bilater knee arthroscopy    HX ORTHOPAEDIC Left     shoulder arthroscopy    HX OTHER SURGICAL  3/2008    hematoma s/p Rt BR lumpectomy    HX TONSILLECTOMY  1963    T&A    MRI GUIDE BREAST BX LT Left     SC ABDOMEN SURGERY PROC UNLISTED  1998    COLECTOMY (1/2 REMOVED; \"WAS IN KNOTS\")    SC BREAST SURGERY PROCEDURE UNLISTED Right 2/2008    RIGHT BREAST LUMPECTOMY       Personal factors and/or comorbidities impacting plan of care:     Home Situation  Home Environment: Private residence  # Steps to Enter: 4  Rails to Enter: Yes  Hand Rails : Bilateral  One/Two Story Residence: Two story, live on 1st floor  Living Alone: No  Support Systems: Spouse/Significant Other/Partner  Patient Expects to be Discharged to[de-identified] Private residence  Current DME Used/Available at Home: Walker, rolling, Redgie Blocker, straight, Wheelchair  Tub or Shower Type: Shower    EXAMINATION/PRESENTATION/DECISION MAKING:   Critical Behavior:  Neurologic State: Drowsy  Orientation Level: Oriented X4  Cognition: Follows commands       Range Of Motion:  AROM: Within functional limits                       Strength:    Strength: Generally decreased, functional                    Tone & Sensation:   Tone: Normal Sensation: Impaired               Coordination:     Vision:      Functional Mobility:  Bed Mobility:  Rolling: Minimum assistance  Supine to Sit: Minimum assistance  Sit to Supine: Minimum assistance  Scooting: Minimum assistance  Transfers:  Sit to Stand: Minimum assistance  Stand to Sit: Minimum assistance                       Balance:   Sitting: Impaired  Sitting - Static: Good (unsupported)  Sitting - Dynamic: Fair (occasional)  Standing: Impaired; With support  Standing - Static: Constant support; Fair  Standing - Dynamic : Constant support; Fair  Ambulation/Gait Training:  Distance (ft): 4 Feet (ft)  Assistive Device: Gait belt(bilateral HHA)  Ambulation - Level of Assistance: Minimal assistance;Assist x1        Gait Abnormalities: Decreased step clearance;Trunk sway increased        Base of Support: Narrowed     Speed/Lesvia: Pace decreased (<100 feet/min)  Step Length: Right shortened;Left shortened                     Stairs: Therapeutic Exercises:       Functional Measure:  Tinetti test:    Sitting Balance: 1  Arises: 1  Attempts to Rise: 1  Immediate Standing Balance: 1  Standing Balance: 1  Nudged: 0  Eyes Closed: 0  Turn 360 Degrees - Continuous/Discontinuous: 1  Turn 360 Degrees - Steady/Unsteady: 1  Sitting Down: 0  Balance Score: 7 Balance total score  Indication of Gait: 1  R Step Length/Height: 1  L Step Length/Height: 1  R Foot Clearance: 1  L Foot Clearance: 1  Step Symmetry: 1  Step Continuity: 1  Path: 1  Trunk: 1  Walking Time: 1  Gait Score: 10 Gait total score  Total Score: 17/28 Overall total score         Tinetti Tool Score Risk of Falls  <19 = High Fall Risk  19-24 = Moderate Fall Risk  25-28 = Low Fall Risk  Tinetti ME. Performance-Oriented Assessment of Mobility Problems in Elderly Patients. Barnes 68; [id number].  (Scoring Description: PT Bulletin Feb. 10, 1993)    Older adults: Jas Landin et al, 2009; n = [address] elderly evaluated with ABC, ANDREW, ADL, and IADL)  · Mean ANDREW score for males aged 69-68 years = 26.21(3.40)  · Mean ANDREW score for females age 69-68 years = 25.16(4.30)  · Mean ANDREW score for males over 80 years = 23.29(6.02)  · Mean ANDREW score for females over 80 years = 17.20(8.32)        Based on the above components, the patient evaluation is determined to be of the following complexity level: LOW     Pain Rating:  Pt c/o minimal low back pain    Activity Tolerance:   Good and Fair      After treatment patient left in no apparent distress:   Supine in bed, Call bell within reach, Bed / chair alarm activated, and Side rails x 3    COMMUNICATION/EDUCATION:   The patients plan of care was discussed with: Registered nurse. Fall prevention education was provided and the patient/caregiver indicated understanding., Patient/family have participated as able in goal setting and plan of care. , and Patient/family agree to work toward stated goals and plan of care.     Thank you for this referral.  Elsa Guzman, PT, DPT   Time Calculation: 14 mins

## 2021-03-02 NOTE — ANESTHESIA PREPROCEDURE EVALUATION
Anesthetic History   No history of anesthetic complications            Review of Systems / Medical History  Patient summary reviewed, nursing notes reviewed and pertinent labs reviewed    Pulmonary          Smoker         Neuro/Psych         Psychiatric history     Cardiovascular              Hyperlipidemia    Exercise tolerance: <4 METS     GI/Hepatic/Renal     GERD           Endo/Other        Arthritis and cancer     Other Findings   Comments: Lumbago     Fibromyalgia    peripheral neuropathy            Physical Exam    Airway  Mallampati: II  TM Distance: 4 - 6 cm  Neck ROM: normal range of motion   Mouth opening: Normal     Cardiovascular  Regular rate and rhythm,  S1 and S2 normal,  no murmur, click, rub, or gallop             Dental    Dentition: Full upper dentures and Full lower dentures     Pulmonary  Breath sounds clear to auscultation               Abdominal  GI exam deferred       Other Findings            Anesthetic Plan    ASA: 2  Anesthesia type: general          Induction: Intravenous  Anesthetic plan and risks discussed with: Patient

## 2021-03-02 NOTE — ANESTHESIA POSTPROCEDURE EVALUATION
Post-Anesthesia Evaluation and Assessment    Patient: Tricia Santana MRN: [id number]  SSN: [id number]    YOB: 1955  Age: 63 y.o. Sex: female      I have evaluated the patient and they are stable and ready for discharge from the PACU. Cardiovascular Function/Vital Signs  Visit Vitals  /63   Pulse 69   Temp 36.4 °C (97.6 °F)   Resp 11   Ht 5' 2\" (1.575 m)   Wt 68.5 kg (151 lb 0.2 oz)   SpO2 98%   BMI 27.62 kg/m²       Patient is status post General anesthesia for Procedure(s):  LEFT L5-S1 DISCECTOMY. Nausea/Vomiting: None    Postoperative hydration reviewed and adequate. Pain:  Pain Scale 1: FLACC (03/02/21 0945)  Pain Intensity 1: 0 (03/02/21 0945)   Managed    Neurological Status:   Neuro (WDL): Exceptions to WDL (03/02/21 0945)  Neuro  Neurologic State: Eyes open to stimulus (03/02/21 0945)  Orientation Level: Oriented to person (03/02/21 0945)  Cognition: Follows commands (03/02/21 0945)  Speech: Nods appropriately (03/02/21 0945)  LUE Motor Response: Purposeful;Numbness (03/02/21 0945)  LLE Motor Response: Purposeful;Numbness (03/02/21 0945)  RUE Motor Response: Purposeful;Numbness (03/02/21 0945)  RLE Motor Response: Purposeful;Numbness (03/02/21 0945)   At baseline    Mental Status, Level of Consciousness: Alert and  oriented to person, place, and time    Pulmonary Status:   O2 Device: Nasal cannula (03/02/21 1000)   Adequate oxygenation and airway patent    Complications related to anesthesia: None    Post-anesthesia assessment completed.  No concerns    Signed By: Bertha Arizmendi MD     March 2, 2021

## 2021-03-03 VITALS
DIASTOLIC BLOOD PRESSURE: 79 MMHG | BODY MASS INDEX: 27.79 KG/M2 | TEMPERATURE: 98.3 F | WEIGHT: 151.01 LBS | RESPIRATION RATE: 16 BRPM | HEIGHT: 62 IN | SYSTOLIC BLOOD PRESSURE: 121 MMHG | HEART RATE: 79 BPM | OXYGEN SATURATION: 97 %

## 2021-03-03 PROCEDURE — 99218 HC RM OBSERVATION: CPT

## 2021-03-03 PROCEDURE — 94761 N-INVAS EAR/PLS OXIMETRY MLT: CPT

## 2021-03-03 PROCEDURE — 97530 THERAPEUTIC ACTIVITIES: CPT

## 2021-03-03 PROCEDURE — 94760 N-INVAS EAR/PLS OXIMETRY 1: CPT

## 2021-03-03 PROCEDURE — 77010033678 HC OXYGEN DAILY

## 2021-03-03 PROCEDURE — 74011250637 HC RX REV CODE- 250/637: Performed by: ORTHOPAEDIC SURGERY

## 2021-03-03 PROCEDURE — 97116 GAIT TRAINING THERAPY: CPT

## 2021-03-03 PROCEDURE — 74011250636 HC RX REV CODE- 250/636: Performed by: ORTHOPAEDIC SURGERY

## 2021-03-03 PROCEDURE — 97110 THERAPEUTIC EXERCISES: CPT

## 2021-03-03 PROCEDURE — 97165 OT EVAL LOW COMPLEX 30 MIN: CPT | Performed by: OCCUPATIONAL THERAPIST

## 2021-03-03 PROCEDURE — 97535 SELF CARE MNGMENT TRAINING: CPT | Performed by: OCCUPATIONAL THERAPIST

## 2021-03-03 RX ORDER — POLYETHYLENE GLYCOL 3350 17 G/17G
17 POWDER, FOR SOLUTION ORAL
Qty: 15 PACKET | Refills: 0 | Status: SHIPPED | OUTPATIENT
Start: 2021-03-03 | End: 2021-03-18

## 2021-03-03 RX ORDER — POLYETHYLENE GLYCOL 3350 17 G/17G
17 POWDER, FOR SOLUTION ORAL DAILY
Qty: 10 PACKET | Refills: 0 | Status: SHIPPED | OUTPATIENT
Start: 2021-03-04 | End: 2021-06-22

## 2021-03-03 RX ORDER — OXYCODONE HYDROCHLORIDE 5 MG/1
5 TABLET ORAL
Qty: 40 TAB | Refills: 0 | Status: SHIPPED | OUTPATIENT
Start: 2021-03-03 | End: 2021-03-10

## 2021-03-03 RX ORDER — AMOXICILLIN 250 MG
1 CAPSULE ORAL DAILY
Qty: 30 TAB | Refills: 0 | Status: SHIPPED | OUTPATIENT
Start: 2021-03-03 | End: 2021-06-22

## 2021-03-03 RX ORDER — OXYCODONE HYDROCHLORIDE 5 MG/1
5 TABLET ORAL
Qty: 40 TAB | Refills: 0 | Status: SHIPPED | OUTPATIENT
Start: 2021-03-03 | End: 2021-03-03

## 2021-03-03 RX ADMIN — KETOROLAC TROMETHAMINE 15 MG: 30 INJECTION, SOLUTION INTRAMUSCULAR at 05:57

## 2021-03-03 RX ADMIN — Medication 10 ML: at 05:57

## 2021-03-03 RX ADMIN — DIPHENHYDRAMINE HYDROCHLORIDE 100 MG: 25 CAPSULE ORAL at 09:39

## 2021-03-03 RX ADMIN — ACETAMINOPHEN 1000 MG: 500 TABLET ORAL at 12:11

## 2021-03-03 RX ADMIN — GABAPENTIN 600 MG: 300 CAPSULE ORAL at 09:43

## 2021-03-03 RX ADMIN — ACETAMINOPHEN 1000 MG: 500 TABLET ORAL at 05:57

## 2021-03-03 RX ADMIN — DOCUSATE SODIUM 50MG AND SENNOSIDES 8.6MG 1 TABLET: 8.6; 5 TABLET, FILM COATED ORAL at 09:46

## 2021-03-03 RX ADMIN — PANTOPRAZOLE SODIUM 40 MG: 40 TABLET, DELAYED RELEASE ORAL at 05:57

## 2021-03-03 RX ADMIN — Medication 1 AMPULE: at 09:34

## 2021-03-03 RX ADMIN — FAMOTIDINE 20 MG: 20 TABLET ORAL at 09:45

## 2021-03-03 RX ADMIN — POLYETHYLENE GLYCOL 3350 17 G: 17 POWDER, FOR SOLUTION ORAL at 09:49

## 2021-03-03 RX ADMIN — Medication 5000 UNITS: at 09:46

## 2021-03-03 RX ADMIN — Medication 10 ML: at 13:33

## 2021-03-03 RX ADMIN — DULOXETINE HYDROCHLORIDE 60 MG: 30 CAPSULE, DELAYED RELEASE ORAL at 09:42

## 2021-03-03 NOTE — PROGRESS NOTES
Oral and Written notification given to patient and/or caregiver informing them that they are currently an Outpatient receiving care in our facility. Outpatient services include Observation Services.      Roxana Van, [address], [address]

## 2021-03-03 NOTE — PROGRESS NOTES
End of Shift Note    Bedside shift change report given to [facility] (oncoming nurse) by Amanda Zaman RN (offgoing nurse). Report included the following information SBAR, Kardex, OR Summary, Procedure Summary, Intake/Output, MAR, Recent Results and Med Rec Status    Shift worked:  7p-7a     Shift summary and any significant changes:          Concerns for physician to address:       Zone phone for oncoming shift:   [phone]       Activity:  Activity Level: Up with Assistance  Number times ambulated in hallways past shift: 0  Number of times OOB to chair past shift: 1    Cardiac:   Cardiac Monitoring: No      Cardiac Rhythm: Normal sinus rhythm    Access:   Current line(s): PIV     Genitourinary:   Urinary status: voiding    Respiratory:   O2 Device: Nasal cannula  Chronic home O2 use?: NO  Incentive spirometer at bedside: N/A     GI:     Current diet:  DIET REGULAR  Passing flatus: YES  Tolerating current diet: YES       Pain Management:   Patient states pain is manageable on current regimen: YES    Skin:  Bryant Score: 19  Interventions: float heels, increase time out of bed and PT/OT consult    Patient Safety:  Fall Score:  Total Score: 4  Interventions: assistive device (walker, cane, etc), gripper socks and pt to call before getting OOB  High Fall Risk: Yes    Length of Stay:  Expected LOS: - - -  Actual LOS: 0      Radha Navarro RN

## 2021-03-03 NOTE — PROGRESS NOTES
Discharge instructions given per written order. All questions answered. Iv removed per policy, catheter tip intact. Dressing changed prior to discharge.  Patient being transported home via her , taken down by unit staff

## 2021-03-03 NOTE — DISCHARGE SUMMARY
Spine Discharge Summary    Patient ID:  Liam Castellanos  [id number]  female  63 y.o.  1955    Admit date: 3/2/2021    Discharge date: 3/3/2021    Admitting Physician: Kera Domingo MD     Consulting Physician(s):   Treatment Team: Attending Provider: Geovanny Hager MD; Utilization Review: Shiv Kramer RN    Date of Surgery:   3/2/2021     Preoperative Diagnosis:  SPONDYLOSIS/CHRONIC LOW BACK PAIN WITH SCIATICA/STATUS POST SPINAL FUSION/  FIBROMYALOGIA/DISPLACEMENT LUMBAR INTERVERTEBRAL DISC    Postoperative Diagnosis:   SPONDYLOSIS/CHRONIC LOW BACK PAIN WITH SCIATICA/STATUS POST SPINAL FUSION/  FIBROMYALOGIA/DISPLACEMENT LUMBAR INTERVERTEBRAL DISC    Procedure(s):  LEFT L5-S1 DISCECTOMY     Anesthesia Type:   General     Surgeon: Geovanny Hager MD                            HPI:  Pt is a 63 y.o. female who has a history of SPONDYLOSIS/CHRONIC LOW BACK PAIN WITH SCIATICA/STATUS POST SPINAL FUSION/  FIBROMYALOGIA/DISPLACEMENT LUMBAR INTERVERTEBRAL DISC  with pain and limitations of activities of daily living who presents at this time for a discectom following the failure of conservative management. PMH:   Past Medical History:   Diagnosis Date    Arthritis     RHEUMATOID AND OSTEOARTHRITIS    Breast cancer ([facility] [address] [address] 2008    right    C. difficile diarrhea 2004    as of 10/23/14 pt denies any diarrhea or sx    Cancer ([address] 1982    uterine CA, hysterectomy, no chemo / radiation    Cancer ([address] 2008    Rt BR; lumpectomy, radiation with chemo pill x6 yrs    Fibromyalgia     GERD (gastroesophageal reflux disease)     Hypercholesteremia     Neuropathy     Osteoarthritis     Other ill-defined conditions(799.89)     GRANULOMA ANEULARE, SKIN DISORDER    Psychiatric disorder     ANXIETY    Rheumatoid arthritis ([address]     S/P radiation therapy 2008    right breast    Seasonal allergies        Body mass index is 27.62 kg/m². : A BMI > 30 is classified as obesity and > 40 is classified as morbid obesity. Medications upon admission :   Prior to Admission Medications   Prescriptions Last Dose Informant Patient Reported? Taking? Alpha Lipoic Acid 600 mg cap 2/2/2021 at Unknown time  Yes Yes   Sig: Take 600 mg by mouth daily. DULoxetine (CYMBALTA) 60 mg capsule 3/2/2021 at Unknown time  No Yes   Sig: TAKE 2 CAPSULES DAILY   Patient taking differently: Take 60 mg by mouth two (2) times a day. SIMVASTATIN (ZOCOR PO) 3/2/2021 at Unknown time  Yes Yes   Sig: Take 20 mg by mouth nightly. TURMERIC PO Not Taking at Unknown time  Yes No   Sig: Take 500 mg by mouth daily. acetaminophen (TYLENOL EXTRA STRENGTH) 500 mg tablet 2/2/2021 at Unknown time  Yes Yes   Sig: Take 1,000 mg by mouth every six (6) hours as needed for Pain. baclofen (LIORESAL) 10 mg tablet 3/2/2021 at Unknown time  Yes Yes   Sig: Take 20 mg by mouth three (3) times daily. cephALEXin (Keflex) 500 mg capsule 3/2/2021 at Unknown time  No Yes   Sig: Take 1 Cap by mouth two (2) times a day for 7 days. Indications: infection of the urinary tract from Proteus bacteria   cephALEXin (Keflex) 500 mg capsule 3/2/2021 at Unknown time  Yes Yes   Sig: Take 500 mg by mouth two (2) times a day. Indications: infection of the urinary tract from Proteus bacteria   cholecalciferol (Vitamin D3) (5000 Units/125 mcg) tab tablet 2/28/2021  Yes No   Sig: Take 5,000 Units by mouth daily. diclofenac (VOLTAREN) 1 % gel Not Taking at Unknown time  Yes No   Sig: Apply  to affected area as needed. diphenhydrAMINE (BENADRYL) 50 mg tablet 3/2/2021 at Unknown time  Yes Yes   Sig: Take 100 mg by mouth two (2) times a day. Indications: ALLERGIC REACTIONS   diphenoxylate-atropine (LOMOTIL) 2.5-0.025 mg per tablet 2/23/2021 at Unknown time  Yes Yes   Sig: Take 1 Tab by mouth four (4) times daily as needed. doxycycline (MONODOX) 100 mg capsule Not Taking at Unknown time  Yes No   Sig: Take 100 mg by mouth two (2) times a day.  Skin disorder   esomeprazole (NEXIUM) 20 mg capsule 3/1/2021 at Unknown time  Yes Yes   Sig: Take 20 mg by mouth daily. Indications: Heartburn   gabapentin (NEURONTIN) 600 mg tablet 3/2/2021 at Unknown time  Yes Yes   Sig: Take 1,200 mg by mouth three (3) times daily. oxyCODONE IR (ROXICODONE) 5 mg immediate release tablet 2/2/2021 at Unknown time  No Yes   Sig: Take 1-2 Tabs by mouth every four (4) hours as needed. Max Daily Amount: 60 mg. If insurance prior auth./quantity restrictions apply, refer to and look up diagnosis code one prescription. Partial fill as needed is permitted. Please do not contact prescriber. traZODone (DESYREL) 50 mg tablet 2/28/2021  Yes No   Sig: Take 100 mg by mouth nightly. Facility-Administered Medications: None        Allergies: Allergies   Allergen Reactions    Aspirin Other (comments)     Blacked out. Talked with patient. Can take other NSAIDS like Ibuprofen, Naprosyn        Hospital Course: The patient underwent surgery. Complications:  None; patient tolerated the procedure well. Was taken to the PACU in stable condition and then transferred to the ortho floor. Perioperative Antibiotics:  Ancef     Postoperative Pain Management:  Oxycodone      Postoperative transfusions:    Number of units banked PRBCs =   none     Post Op complications: none    Hemoglobin at discharge:    Lab Results   Component Value Date/Time    HGB 14.1 02/23/2021 01:45 PM    INR 0.9 02/23/2021 01:45 PM       Dressing was changed on POD # 1. Incision - clean, dry and intact. No significant erythema or swelling. Neurovascular exam found to be within normal limits. Wound appears to be healing without any evidence of infection. Physical Therapy started following surgery and participated in bed mobility, transfers and ambulation.         Gait:  Gait  Base of Support: Narrowed  Speed/Lesvia: Pace decreased (<100 feet/min)  Step Length: Left shortened, Right shortened  Gait Abnormalities: Decreased step clearance, Trunk sway increased  Ambulation - Level of Assistance: Stand-by assistance, Contact guard assistance, Assist x1  Distance (ft): 200 Feet (ft)  Assistive Device: Gait belt, Walker, rolling  Rail Use: Both  Stairs - Level of Assistance: Stand-by assistance  Number of Stairs Trained: 4  Interventions: Verbal cues            Interventions: Verbal cues      Discharged to: Home. Condition on Discharge:   stable    Discharge instructions:    - Take pain medications as prescribed  - Resume pre hospital diet      - Discharge activity: activity as tolerated  - Ambulate as tolerated  - Wound Care Keep wound clean and dry. See discharge instruction sheet. -DISCHARGE MEDICATION LIST     Current Discharge Medication List      START taking these medications    Details   polyethylene glycol (MIRALAX) 17 gram packet Take 1 Packet by mouth daily as needed (constipation) for up to 15 days. Qty: 15 Packet, Refills: 0      senna-docusate (PERICOLACE) 8.6-50 mg per tablet Take 1 Tab by mouth daily. Qty: 30 Tab, Refills: 0         CONTINUE these medications which have CHANGED    Details   oxyCODONE IR (ROXICODONE) 5 mg immediate release tablet Take 1 Tab by mouth every four (4) hours as needed for Pain for up to 7 days. Max Daily Amount: 30 mg.  Qty: 40 Tab, Refills: 0    Associated Diagnoses: S/P discectomy         CONTINUE these medications which have NOT CHANGED    Details   cephALEXin (Keflex) 500 mg capsule Take 1 Cap by mouth two (2) times a day for 7 days. Indications: infection of the urinary tract from Proteus bacteria  Qty: 14 Cap, Refills: 0      Alpha Lipoic Acid 600 mg cap Take 600 mg by mouth daily. gabapentin (NEURONTIN) 600 mg tablet Take 1,200 mg by mouth three (3) times daily. acetaminophen (TYLENOL EXTRA STRENGTH) 500 mg tablet Take 1,000 mg by mouth every six (6) hours as needed for Pain.      esomeprazole (NEXIUM) 20 mg capsule Take 20 mg by mouth daily.  Indications: Heartburn      DULoxetine (CYMBALTA) 60 mg capsule TAKE 2 CAPSULES DAILY  Qty: 180 Cap, Refills: 2    Associated Diagnoses: Neuropathy; Gait abnormality      diphenhydrAMINE (BENADRYL) 50 mg tablet Take 100 mg by mouth two (2) times a day. Indications: ALLERGIC REACTIONS      SIMVASTATIN (ZOCOR PO) Take 20 mg by mouth nightly. TURMERIC PO Take 500 mg by mouth daily. cholecalciferol (Vitamin D3) (5000 Units/125 mcg) tab tablet Take 5,000 Units by mouth daily. doxycycline (MONODOX) 100 mg capsule Take 100 mg by mouth two (2) times a day. Skin disorder      traZODone (DESYREL) 50 mg tablet Take 100 mg by mouth nightly. diclofenac (VOLTAREN) 1 % gel Apply  to affected area as needed. STOP taking these medications       baclofen (LIORESAL) 10 mg tablet Comments:   Reason for Stopping:         diphenoxylate-atropine (LOMOTIL) 2.5-0.025 mg per tablet Comments:   Reason for Stopping:            per medical continuation form      -Follow up in office in 2 weeks      Signed:  Diogenes Noriega, ACNP-BC, ONP-C  Orthopaedic Nurse Practitioner    3/3/2021  2:58 PM

## 2021-03-03 NOTE — PROGRESS NOTES
Ortho/ NeuroSurgery NP Note    POD# 1  s/p LEFT L5-S1 DISCECTOMY     Pt sitting up fully dressed on edge of bed. Student nurse in room   Alert, oriented. Walking independently in room  Has not had PT yet (was eating breakfast when they came to se her earlier)    VSS Afebrile. No nausea. Tolerating PO well    Most Recent Labs:   Lab Results   Component Value Date/Time    HGB 14.1 02/23/2021 01:45 PM    Hemoglobin A1c 5.7 (H) 02/23/2021 01:45 PM       Body mass index is 27.62 kg/m². Voiding status: +void  Discussed Laxatives and bowel regimen at home    CORWIN Dressing c.d.i however the tubing was disconnected from CORWIN device  Will have nursing change CORWIN to optifoam.    Moving extremities well.  Pain controlled  Sensation and motor intact  SCDs for mechanical DVT proph    Plan:  1) PT today - home when clears  2) Resume Keflex for Pre-op POA UTI Proteus  3) Discharge plans to home when clears PT     Readiness for discharge:     [x] Vital Signs stable    [x] Hgb stable    [x] + Voiding    [x] Incision intact, drainage minimal    [x] Tolerating PO intake     [] Cleared by PT (OT if applicable)     [] Stair training completed (if applicable)    [] Independent/Contact Guard ambulation with assistive device (household distance)     [] Bed mobility     [] Car transfers     [] ADLs    [x] Adequate pain control on oral medication alone    Home later today    Fany Muñoz NP

## 2021-03-03 NOTE — PROGRESS NOTES
Occupational Therapy  Orders received and medical record reviewed. Pt will need [facility] OT/Home Health Safety Evaluation at discharge. Full OT note to follow.

## 2021-03-03 NOTE — PROGRESS NOTES
End of Shift Note    Bedside shift change report given to Aubree Osborn RN (oncoming nurse) by Sanford Winkler (offgoing nurse). Report included the following information SBAR, Kardex, OR Summary, Intake/Output, MAR and Recent Results    Shift worked:  Day     Shift summary and any significant changes:     Patient very drowsy after surgery. More awake for dinner and ate. Held benadryl because she was so drowsy. Up to bedside commode and voided. Pt worked with but was not able to do much because was so drowsy. Not complaining of pain but does have numbness in extremities that is baseline at home. Concerns for physician to address:  Reevaluation with PT/OT and pain management      Zone phone for oncoming shift:   [phone]       Activity:  Activity Level: Up with Assistance  Number times ambulated in hallways past shift: 0  Number of times OOB to chair past shift: 1    Cardiac:   Cardiac Monitoring: No      Cardiac Rhythm: Normal sinus rhythm    Access:   Current line(s): PIV     Genitourinary:   Urinary status: voiding    Respiratory:   O2 Device: Nasal cannula  Chronic home O2 use?: NO  Incentive spirometer at bedside: YES     GI:     Current diet:  DIET REGULAR  Passing flatus: YES  Tolerating current diet: YES       Pain Management:   Patient states pain is manageable on current regimen: YES    Skin:  Bryant Score: 19  Interventions: float heels, increase time out of bed and limit briefs    Patient Safety:  Fall Score:  Total Score: 4  Interventions: assistive device (walker, cane, etc), gripper socks and pt to call before getting OOB  High Fall Risk: Yes    Length of Stay:  Expected LOS: - - -  Actual LOS: 0      Sanford Winkler

## 2021-03-03 NOTE — PROGRESS NOTES
Problem: Mobility Impaired (Adult and Pediatric)  Goal: *Acute Goals and Plan of Care (Insert Text)  Description: FUNCTIONAL STATUS PRIOR TO ADMISSION: Patient was modified independent using a single point cane for functional mobility. HOME SUPPORT PRIOR TO ADMISSION: The patient lived with spouse but did not require assist.    Physical Therapy Goals  Initiated 3/2/2021    1. Patient will move from supine to sit and sit to supine  in bed with modified independence within 4 days. 2. Patient will perform sit to stand with modified independence within 4 days. 3. Patient will ambulate with modified independence for 300 feet with the least restrictive device within 4 days. 4. Patient will ascend/descend 4 stairs with single handrail(s) with supervision/set-up within 4 days. 5. Patient will verbalize and demonstrate understanding of spinal precautions (No bending, lifting greater than 5 lbs, or twisting; log-roll technique; frequent repositioning as instructed) within 4 days. Outcome: Progressing Towards Goal   PHYSICAL THERAPY TREATMENT  Patient: Manisha Fragoso (20 y.o. female)  Date: 3/3/2021  Diagnosis: HNP (herniated nucleus pulposus), lumbar [M51.26]     Procedure(s) (LRB): LEFT L5-S1 DISCECTOMY (Left) 1 Day Post-Op    Precautions: Fall, Back No bending, no lifting greater than 5 lbs, no twisting, log-roll technique, repositioning every 20-30 min except when sleeping, brace when OOB     Chart, physical therapy assessment, plan of care and goals were reviewed. ASSESSMENT: Patient continues with skilled PT services and is progressing towards goals, no LOB or SOB, did well with stair trng and car transfer, good motivation, does well with ther-ex, vc's for safety and proper RW use. Current Level of Function Impacting Discharge (mobility/balance): Stand-by assistance;Contact guard assistance         PLAN : Patient continues to benefit from skilled intervention to address the above impairments. Continue treatment per established plan of care  to address goals. Recommendation for discharge: (in order for the patient to meet his/her long term goals) Outpatient physical therapy follow up recommended when MD prescribes. This discharge recommendation: Has been made in collaboration with the attending provider and/or case management    IF patient discharges home will need the following DME: patient owns DME required for discharge     OBJECTIVE DATA SUMMARY:     Critical Behavior:  Neurologic State: Alert  Orientation Level: Oriented X4  Cognition: Appropriate decision making, Appropriate safety awareness, Appropriate for age attention/concentration, Follows commands    Spinal diagnosis intervention: The patient stated 3/3 back precautions when prompted. Reviewed all 3 back precautions, log roll technique, and sitting for 30 minutes at a time. Reviewed back brace application and wear schedule. Brace donned on arrival     Functional Mobility Training:    Bed Mobility: Pt sitting in chair on arrival.    Transfers:  Sit to Stand: Stand-by assistance  Stand to Sit: Stand-by assistance  Bed to Chair: Stand-by assistance;Contact guard assistance  Interventions: Tactile cues; Verbal cues  Level of Assistance: Stand-by assistance;Contact guard assistance    Balance:  Sitting: Intact; Without support  Sitting - Static: Good (unsupported)  Sitting - Dynamic: Not tested  Standing: Intact; With support  Standing - Static: Good;Constant support  Standing - Dynamic : Good;Constant support    Ambulation/Gait Training:  Distance (ft): 200 Feet (ft)  Assistive Device: Gait belt;Walker, rolling  Ambulation - Level of Assistance: Stand-by assistance;Contact guard assistance;Assist x1  Gait Abnormalities: Decreased step clearance;Trunk sway increased  Right Side Weight Bearing: Full  Left Side Weight Bearing: Full  Base of Support: Narrowed  Speed/Lesvia: Pace decreased (<100 feet/min)  Step Length: Left shortened;Right shortened  Interventions: Verbal cues    Stairs:  Number of Stairs Trained: 4  Stairs - Level of Assistance: Stand-by assistance   Rail Use: Both    Therapeutic Exercises:   sitting  EXERCISE   Sets   Reps   Active Active Assist   Passive   Comments   Ankle pumps 1 10 [x] [] [] bilat   Heel raises 1 10 [x] [] [] \"   Toe tap 1 10 [x] [] [] \"   Knee ext 1 10 [x] [] [] \"   Hip flex 1 10 [x] [] [] \"     Pain Rating:    Activity Tolerance: Good    After treatment patient left in no apparent distress: Sitting in chair and Call bell within reach    COMMUNICATION/COLLABORATION:   The patients plan of care was discussed with: Registered nurse.      Mark Iverson PTA   Time Calculation: 30 mins

## 2021-03-03 NOTE — PROGRESS NOTES
Problem: Self Care Deficits Care Plan (Adult)  Goal: *Acute Goals and Plan of Care (Insert Text)  Description:   FUNCTIONAL STATUS PRIOR TO ADMISSION: pt reports that she lives with her  who is able to assist her prn. Pt reports that she was using a RW. She stated that she's had little sleep over the past several days. Pt sleeps in her recliner approx 50% of the time. Pt cannot get her RW into the bathroom. She does have a BSC that encouraged pt to place near bed/Recliner at discharge in the early stages of her recovery for safety. Pt was educated on hip kit and does not need at this time as she is able to use adaptive techniques to complete self care. She has several reachers at home that are available to her. HOME SUPPORT: The patient lived with her . Occupational Therapy Goals  Initiated 3/3/2021  1. Patient will perform grooming in stnading with supervision/set-up within 7 day(s). 2.  Patient will perform bathing with supervision/set-up , using best DME, within 7 day(s). 3.  Patient will perform upper body dressing and lower body dressing with modified independence, demonstrating safe technique, within 7 day(s). 4.  Patient will perform toilet transfers with modified independence, within 7 day(s). 5.  Patient will perform all aspects of toileting with modified independence within 7 day(s). 6.  Patient will participate in upper extremity therapeutic exercise/activities with supervision/set-up for 5 minutes within 7 day(s). 7.  Patient will perform standing adls for at least 5 minutes demonstrating performing adls and adhering to back precautions within 7 days.   Outcome: Not Met   OCCUPATIONAL THERAPY EVALUATION  Patient: Burtis Gosselin (31 y.o. female)  Date: 3/3/2021  Primary Diagnosis: HNP (herniated nucleus pulposus), lumbar [M51.26]  Procedure(s) (LRB):  LEFT L5-S1 DISCECTOMY (Left) 1 Day Post-Op   Precautions:   Fall, Back    ASSESSMENT  Based on the objective data described below, the patient presents with back precautions (needs cues) on POD 1 of spinal surgery, decreased balance, baseline impaired sensation due to neuropathy, mild generalized weakness and decreased endurance impairing adls and functional mobility.  Pt performed self care with adaptive techniques and using the RW with S/CGA.  Pt was able to don her back brace at the wall instanding position--did not break her no twisting precautions.  Pt is able to state her back precautions, but needs cues not to twist during adls and adl mobility using RW.  .  Pt would benefit from HH OT and  Safety Evaluation at discharge.     Current Level of Function Impacting Discharge (ADLs/self-care): up to minimal assist for self care, CGA for adl mobility using RW and cues for adhering to precautions.      Functional Outcome Measure:  The patient scored Total: 55/100 on the Barthel Index outcome measure which is indicative of 45% impaired ability to care for basic self needs/dependency on others; inferred dependency on others for instrumental ADLs.        Other factors to consider for discharge: pt states that her  will be able to assist her at home.       Patient will benefit from skilled therapy intervention to address the above noted impairments.       PLAN :  Recommendations and Planned Interventions: self care training, functional mobility training, therapeutic exercise, balance training, therapeutic activities, endurance activities, patient education, home safety training, and family training/education    Frequency/Duration: Patient will be followed by occupational therapy 5 times a week to address goals.    Recommendation for discharge: (in order for the patient to meet his/her long term goals)  Occupational therapy at least 2 days/week in the home AND ensure assist and/or supervision for safety with adls/adl mobility.  Needs Home Health Safety Evaluation.      This discharge recommendation:  Has been made in  collaboration with the attending provider and/or case management    IF patient discharges home will need the following DME: likely none       SUBJECTIVE:   Patient stated I had that before I came in .    (re: back brace)  Pt reports that her RW will not fit into her bathroom      OBJECTIVE DATA SUMMARY:   HISTORY:   Past Medical History:   Diagnosis Date    Arthritis     RHEUMATOID AND OSTEOARTHRITIS    Breast cancer ([facility] [address] 2008    right    C. difficile diarrhea 2004    as of 10/23/14 pt denies any diarrhea or sx    Cancer ([facility] [address] 1982    uterine CA, hysterectomy, no chemo / radiation    Cancer ([facility] [address] 2008    Rt BR; lumpectomy, radiation with chemo pill x6 yrs    Fibromyalgia     GERD (gastroesophageal reflux disease)     Hypercholesteremia     Neuropathy     Osteoarthritis     Other ill-defined conditions(799.89)     GRANULOMA ANEULARE, SKIN DISORDER    Psychiatric disorder     ANXIETY    Rheumatoid arthritis ([facility] [address]     S/P radiation therapy 2008    right breast    Seasonal allergies      Past Surgical History:   Procedure Laterality Date    COLONOSCOPY N/A 5/25/2018    COLONOSCOPY performed by Jimmy Anne MD at [facility] ENDOSCOPY    HX APPENDECTOMY      HX CARPAL TUNNEL RELEASE Bilateral     HX CERVICAL DISKECTOMY  2/3/14    C4-5     HX COLONOSCOPY      HX GI      ENDOSCOPY (DILATATION/ESOPH)    HX HYSTERECTOMY  1982    partial    HX KNEE ARTHROSCOPY Left     x2    HX LUMBAR DISKECTOMY  06/2018    HX MOHS PROCEDURES Right     HX ORTHOPAEDIC  2005, 2011    CERVICAL NECKx 3    HX ORTHOPAEDIC Bilateral 1988    carpal tunnel repair, and bilater knee arthroscopy    HX ORTHOPAEDIC Left     shoulder arthroscopy    HX OTHER SURGICAL  3/2008    hematoma s/p Rt BR lumpectomy    HX TONSILLECTOMY  1963    T&A    MRI GUIDE BREAST BX LT Left     VT ABDOMEN SURGERY PROC UNLISTED  1998    COLECTOMY (1/2 REMOVED; \"WAS IN KNOTS\")    VT BREAST SURGERY PROCEDURE UNLISTED Right 2/2008    RIGHT BREAST LUMPECTOMY       Expanded or extensive additional review of patient history:     Home Situation  Home Environment: Private residence  # Steps to Enter: 4  Rails to Enter: Yes  Hand Rails : Bilateral  One/Two Story Residence: Two story, live on 1st floor  Living Alone: No  Support Systems: Spouse/Significant Other/Partner  Patient Expects to be Discharged to[de-identified] Private residence  Current DME Used/Available at Home: [employer], rolling, [employer], straight, Wheelchair  Tub or Shower Type: Shower    Hand dominance: Right    EXAMINATION OF PERFORMANCE DEFICITS:  Cognitive/Behavioral Status:  Neurologic State: Alert  Orientation Level: Appropriate for age  Cognition: Follows commands  Perception: Appears intact  Perseveration: No perseveration noted  Safety/Judgement: Fall prevention;Home safety    Skin: generally intact, incision not viewed    Edema: none observed    Hearing: Auditory  Auditory Impairment: None    Vision/Perceptual:                           Acuity: (not formally assessed)         Range of Motion:  BUEs:    AROM: Within functional limits(slight discomfort in low back in BUE sh. flexion)                         Strength:  BUEs:  Strength: Generally decreased, functional                Coordination:  Coordination: Within functional limits  Fine Motor Skills-Upper: Left Intact; Right Intact    Gross Motor Skills-Upper: Left Intact; Right Intact    Tone & Sensation:    Tone: Normal  Sensation: Impaired(numbness intermittently due to neuropathy)                      Balance:  Sitting: Intact; Without support  Sitting - Static: Good (unsupported)  Sitting - Dynamic: Not tested  Standing: Intact; With support  Standing - Static: Good;Constant support  Standing - Dynamic : Good;Constant support    Functional Mobility and Transfers for ADLs:  Bed Mobility:  Supine to Sit: Supervision;Stand-by assistance(educated in log roll technioque)  Sit to Supine: Supervision(cues for log roll)  Scooting: Supervision    Transfers:  Sit to Stand: Stand-by assistance  Stand to Sit: Stand-by assistance  Bed to Chair: Stand-by assistance;Contact guard assistance  Bathroom Mobility: Supervision/set up;Contact guard assistance  Toilet Transfer : Supervision;Contact guard assistance  Assistive Device : Walker, rolling(pt cannot get her RW in her bathroom at home)    ADL Assessment:  Feeding: Independent    Oral Facial Hygiene/Grooming: Supervision;Setup(while standing)    Bathing: Minimum assistance    Upper Body Dressing: Stand-by assistance(cues to maintain precaution--no twist)    Lower Body Dressing: Supervision;Setup(using crossed leg technique due to back precautions)    Toileting: Supervision;Stand by assistance(educ in technique and avail of toilet tongs if needed)                ADL Intervention and task modifications:   Pt educated in home safety and fall prevention. Performed crossed leg technique for lower body adls. Reinforced back precautions. Able to don back brace while standing at wall without breaking back precautions. Suggested that pt use her BSC near her bed or recliner at home for safety when she initially returns home due to  the distance to get to the bathroom                                   Cognitive Retraining  Safety/Judgement: Fall prevention;Home safety    Functional Measure:  Barthel Index:    Bathin  Bladder: 10  Bowels: 10  Groomin  Dressin  Feeding: 10  Mobility: 0  Stairs: 0  Toilet Use: 5  Transfer (Bed to Chair and Back): 10  Total: 55/100        The Barthel ADL Index: Guidelines  1. The index should be used as a record of what a patient does, not as a record of what a patient could do. 2. The main aim is to establish degree of independence from any help, physical or verbal, however minor and for whatever reason. 3. The need for supervision renders the patient not independent. 4. A patient's performance should be established using the best available evidence.  Asking the patient, friends/relatives and nurses are the usual sources, but direct observation and common sense are also important. However direct testing is not needed. 5. Usually the patient's performance over the preceding 24-48 hours is important, but occasionally longer periods will be relevant. 6. Middle categories imply that the patient supplies over 50 per cent of the effort. 7. Use of aids to be independent is allowed. Mike Macias., Barthel, D.W. (8773). Functional evaluation: the Barthel Index. [facility] (14)2. RICHARD Lemus, Kenny Guidry., Jayce Lowe., [place], [facility] (1999). Measuring the change indisability after inpatient rehabilitation; comparison of the responsiveness of the Barthel Index and Functional [place] Measure. Journal of Neurology, Neurosurgery, and Psychiatry, 66(4), 921-389. ROLY Arvizu, LEONARDO Lloyd, & Yuko Medrano M.A. (2004.) Assessment of post-stroke quality of life in cost-effectiveness studies: The usefulness of the Barthel Index and the EuroQoL-5D. Quality of Life Research, 15, 184-91         Occupational Therapy Evaluation Charge Determination   History Examination Decision-Making   LOW Complexity : Brief history review  MEDIUM Complexity : 3-5 performance deficits relating to physical, cognitive , or psychosocial skils that result in activity limitations and / or participation restrictions MEDIUM Complexity : Patient may present with comorbidities that affect occupational performnce. Miniml to moderate modification of tasks or assistance (eg, physical or verbal ) with assesment(s) is necessary to enable patient to complete evaluation       Based on the above components, the patient evaluation is determined to be of the following complexity level: LOW   Pain Rating:  No complaints of pain    Activity Tolerance:   Good    After treatment patient left in no apparent distress: With PTA for ambulation/PT session.       COMMUNICATION/EDUCATION:   The patients plan of care was discussed with: Physical therapy assistant and Registered nurse. Patient/family have participated as able in goal setting and plan of care. This patients plan of care is appropriate for delegation to [facility].     Thank you for this referral.  Anahi Phan, OTR/L  Time Calculation: 43 mins

## 2021-06-22 ENCOUNTER — APPOINTMENT (OUTPATIENT)
Dept: CT IMAGING | Age: 66
End: 2021-06-22
Attending: EMERGENCY MEDICINE
Payer: MEDICARE

## 2021-06-22 ENCOUNTER — HOSPITAL ENCOUNTER (EMERGENCY)
Age: 66
Discharge: HOME | End: 2021-06-23
Attending: EMERGENCY MEDICINE
Payer: MEDICARE

## 2021-06-22 DIAGNOSIS — M54.42 LOW BACK PAIN WITH LEFT-SIDED SCIATICA, UNSPECIFIED BACK PAIN LATERALITY, UNSPECIFIED CHRONICITY: ICD-10-CM

## 2021-06-22 DIAGNOSIS — M51.26 LUMBAR HERNIATED DISC: Primary | ICD-10-CM

## 2021-06-22 LAB
ALBUMIN SERPL-MCNC: 3.2 G/DL (ref 3.5–5)
ALBUMIN/GLOB SERPL: 1 {RATIO} (ref 1.1–2.2)
ALP SERPL-CCNC: 112 U/L (ref 45–117)
ALT SERPL-CCNC: 22 U/L (ref 12–78)
ANION GAP SERPL CALC-SCNC: 11 MMOL/L (ref 5–15)
AST SERPL-CCNC: 12 U/L (ref 15–37)
BASOPHILS # BLD: 0 K/UL (ref 0–0.1)
BASOPHILS NFR BLD: 0 % (ref 0–1)
BILIRUB SERPL-MCNC: 0.4 MG/DL (ref 0.2–1)
BUN SERPL-MCNC: 20 MG/DL (ref 6–20)
BUN/CREAT SERPL: 17 (ref 12–20)
CALCIUM SERPL-MCNC: 8.9 MG/DL (ref 8.5–10.1)
CHLORIDE SERPL-SCNC: 105 MMOL/L (ref 97–108)
CO2 SERPL-SCNC: 20 MMOL/L (ref 21–32)
CREAT SERPL-MCNC: 1.2 MG/DL (ref 0.55–1.02)
DIFFERENTIAL METHOD BLD: ABNORMAL
EOSINOPHIL # BLD: 0 K/UL (ref 0–0.4)
EOSINOPHIL NFR BLD: 0 % (ref 0–7)
ERYTHROCYTE [DISTWIDTH] IN BLOOD BY AUTOMATED COUNT: 13.1 % (ref 11.5–14.5)
GLOBULIN SER CALC-MCNC: 3.2 G/DL (ref 2–4)
GLUCOSE SERPL-MCNC: 218 MG/DL (ref 65–100)
HCT VFR BLD AUTO: 44.4 % (ref 35–47)
HGB BLD-MCNC: 14.7 G/DL (ref 11.5–16)
IMM GRANULOCYTES # BLD AUTO: 0.1 K/UL (ref 0–0.04)
IMM GRANULOCYTES NFR BLD AUTO: 1 % (ref 0–0.5)
LYMPHOCYTES # BLD: 1.2 K/UL (ref 0.8–3.5)
LYMPHOCYTES NFR BLD: 13 % (ref 12–49)
MCH RBC QN AUTO: 30.4 PG (ref 26–34)
MCHC RBC AUTO-ENTMCNC: 33.1 G/DL (ref 30–36.5)
MCV RBC AUTO: 91.7 FL (ref 80–99)
MONOCYTES # BLD: 0.3 K/UL (ref 0–1)
MONOCYTES NFR BLD: 3 % (ref 5–13)
NEUTS SEG # BLD: 7.6 K/UL (ref 1.8–8)
NEUTS SEG NFR BLD: 83 % (ref 32–75)
NRBC # BLD: 0 K/UL (ref 0–0.01)
NRBC BLD-RTO: 0 PER 100 WBC
PLATELET # BLD AUTO: 275 K/UL (ref 150–400)
PMV BLD AUTO: 10.2 FL (ref 8.9–12.9)
POTASSIUM SERPL-SCNC: 3.7 MMOL/L (ref 3.5–5.1)
PROT SERPL-MCNC: 6.4 G/DL (ref 6.4–8.2)
RBC # BLD AUTO: 4.84 M/UL (ref 3.8–5.2)
SODIUM SERPL-SCNC: 136 MMOL/L (ref 136–145)
WBC # BLD AUTO: 9.2 K/UL (ref 3.6–11)

## 2021-06-22 PROCEDURE — 96375 TX/PRO/DX INJ NEW DRUG ADDON: CPT

## 2021-06-22 PROCEDURE — 85025 COMPLETE CBC W/AUTO DIFF WBC: CPT

## 2021-06-22 PROCEDURE — 99284 EMERGENCY DEPT VISIT MOD MDM: CPT

## 2021-06-22 PROCEDURE — 74011250636 HC RX REV CODE- 250/636: Performed by: EMERGENCY MEDICINE

## 2021-06-22 PROCEDURE — 80053 COMPREHEN METABOLIC PANEL: CPT

## 2021-06-22 PROCEDURE — 96374 THER/PROPH/DIAG INJ IV PUSH: CPT

## 2021-06-22 PROCEDURE — 72131 CT LUMBAR SPINE W/O DYE: CPT

## 2021-06-22 PROCEDURE — 36415 COLL VENOUS BLD VENIPUNCTURE: CPT

## 2021-06-22 RX ORDER — OXYCODONE HYDROCHLORIDE 5 MG/1
5 TABLET ORAL
Qty: 12 TABLET | Refills: 0 | Status: SHIPPED | OUTPATIENT
Start: 2021-06-22 | End: 2021-06-25

## 2021-06-22 RX ORDER — MORPHINE SULFATE 2 MG/ML
4 INJECTION, SOLUTION INTRAMUSCULAR; INTRAVENOUS
Status: COMPLETED | OUTPATIENT
Start: 2021-06-22 | End: 2021-06-22

## 2021-06-22 RX ORDER — MORPHINE SULFATE 2 MG/ML
4 INJECTION, SOLUTION INTRAMUSCULAR; INTRAVENOUS
Status: COMPLETED | OUTPATIENT
Start: 2021-06-22 | End: 2021-06-23

## 2021-06-22 RX ORDER — KETOROLAC TROMETHAMINE 30 MG/ML
30 INJECTION, SOLUTION INTRAMUSCULAR; INTRAVENOUS
Status: COMPLETED | OUTPATIENT
Start: 2021-06-22 | End: 2021-06-22

## 2021-06-22 RX ADMIN — MORPHINE SULFATE 4 MG: 2 INJECTION, SOLUTION INTRAMUSCULAR; INTRAVENOUS at 22:31

## 2021-06-22 RX ADMIN — KETOROLAC TROMETHAMINE 30 MG: 30 INJECTION, SOLUTION INTRAMUSCULAR; INTRAVENOUS at 22:31

## 2021-06-23 VITALS
TEMPERATURE: 97.2 F | RESPIRATION RATE: 18 BRPM | SYSTOLIC BLOOD PRESSURE: 120 MMHG | HEART RATE: 104 BPM | OXYGEN SATURATION: 99 % | WEIGHT: 151 LBS | DIASTOLIC BLOOD PRESSURE: 84 MMHG | BODY MASS INDEX: 27.62 KG/M2

## 2021-06-23 PROCEDURE — 96376 TX/PRO/DX INJ SAME DRUG ADON: CPT

## 2021-06-23 PROCEDURE — 74011250636 HC RX REV CODE- 250/636: Performed by: EMERGENCY MEDICINE

## 2021-06-23 RX ADMIN — MORPHINE SULFATE 4 MG: 2 INJECTION, SOLUTION INTRAMUSCULAR; INTRAVENOUS at 00:01

## 2021-06-23 NOTE — ED NOTES
Bedside shift change report given to [address] [place] [address] (oncoming nurse) by Sammuel Bernheim (offgoing nurse). Report included the following information SBAR, ED Summary, MAR and Recent Results.

## 2021-06-23 NOTE — ED NOTES
Pt presents to the ED for back pain. Pt states she has had the back pain for 2 weeks and the pain medications she takes at home isn't helping. She states she had back surgery back in March. Pt states she bent over and heard a \"pop\" on the 10th of this month and has been in severe pain ever since. She states her left leg is numb. She was seen in the ED last night for the same problem.

## 2021-06-23 NOTE — ED PROVIDER NOTES
EMERGENCY DEPARTMENT HISTORY AND PHYSICAL EXAM      Date: 6/22/2021  Patient Name: Rudy Knox    History of Presenting Illness     Chief Complaint   Patient presents with    Back Pain     x 2 weeks, reports that she had surgery in March performed by Dr. Jose Martinez - reports that she bent over 2 weeks ago and pain increased - denies any fall or trauma; was seen at an ED in [place] last night for the same       History Provided By: Patient    HPI: Rudy Knox, 63 y.o. female with PMHx as noted below presents the emergency department chief complaint of back pain. Patient reports that she has had persistent lower back pain now for the last 2 weeks. Patient states that time she bent over and felt a \"pop\". Patient rates the pain is severe and worse with movement. Patient notes she has been having some radiation of pain down her left leg with some associated numbness which has been an ongoing issue. Denying any bowel/bladder dysfunction, saddle anesthesia or leg weakness. Denying any fevers or systemic symptoms. Patient states she has an appointment with Dr. Jose Martinez tomorrow. Pt denies any other alleviating or exacerbating factors. Additionally, pt specifically denies any recent fever, chills, headache, nausea, vomiting, abdominal pain, CP, SOB, lightheadedness, dizziness,  weakness, BLE swelling, heart palpitations, urinary sxs, diarrhea, constipation, melena, hematochezia, cough, or congestion. PCP: Colin Mccarty NP    Current Outpatient Medications   Medication Sig Dispense Refill    oxyCODONE IR (Roxicodone) 5 mg immediate release tablet Take 1 Tablet by mouth every six (6) hours as needed for Pain for up to 3 days. Max Daily Amount: 20 mg. 12 Tablet 0    TURMERIC PO Take 500 mg by mouth daily.  cholecalciferol (Vitamin D3) (5000 Units/125 mcg) tab tablet Take 5,000 Units by mouth daily.  Alpha Lipoic Acid 600 mg cap Take 600 mg by mouth daily.       doxycycline (MONODOX) 100 mg capsule Take 100 mg by mouth two (2) times a day. Skin disorder      traZODone (DESYREL) 50 mg tablet Take 100 mg by mouth nightly.  gabapentin (NEURONTIN) 600 mg tablet Take 1,200 mg by mouth three (3) times daily.  acetaminophen (TYLENOL EXTRA STRENGTH) 500 mg tablet Take 1,000 mg by mouth every six (6) hours as needed for Pain.  esomeprazole (NEXIUM) 20 mg capsule Take 20 mg by mouth daily. Indications: Heartburn      diclofenac (VOLTAREN) 1 % gel Apply  to affected area as needed.  DULoxetine (CYMBALTA) 60 mg capsule TAKE 2 CAPSULES DAILY (Patient taking differently: Take 60 mg by mouth two (2) times a day.) 180 Cap 2    diphenhydrAMINE (BENADRYL) 50 mg tablet Take 100 mg by mouth two (2) times a day. Indications: ALLERGIC REACTIONS      SIMVASTATIN (ZOCOR PO) Take 20 mg by mouth nightly.          Past History     Past Medical History:  Past Medical History:   Diagnosis Date    Arthritis     RHEUMATOID AND OSTEOARTHRITIS    Breast cancer ([facility] [address] 2008    right    C. difficile diarrhea 2004    as of 10/23/14 pt denies any diarrhea or sx    Cancer ([address] 1982    uterine CA, hysterectomy, no chemo / radiation    Cancer ([facility] [address] 2008    Rt BR; lumpectomy, radiation with chemo pill x6 yrs    Fibromyalgia     GERD (gastroesophageal reflux disease)     Hypercholesteremia     Neuropathy     Osteoarthritis     Other ill-defined conditions(799.89)     GRANULOMA ANEULARE, SKIN DISORDER    Psychiatric disorder     ANXIETY    Rheumatoid arthritis ([facility] [address]     S/P radiation therapy 2008    right breast    Seasonal allergies        Past Surgical History:  Past Surgical History:   Procedure Laterality Date    COLONOSCOPY N/A 5/25/2018    COLONOSCOPY performed by Eric Oppenheim, [facility] at [facility]OSCOPY    HX APPENDECTOMY      HX CARPAL TUNNEL RELEASE Bilateral     HX CERVICAL DISKECTOMY  2/3/14    C4-5     HX COLONOSCOPY      HX GI      ENDOSCOPY (DILATATION/ESOPH)   [facility] partial    HX KNEE ARTHROSCOPY Left     x2    HX LUMBAR DISKECTOMY  06/2018    HX MOHS PROCEDURES Right     HX ORTHOPAEDIC  2005, 2011    CERVICAL NECKx 3    HX ORTHOPAEDIC Bilateral 1988    carpal tunnel repair, and bilater knee arthroscopy    HX ORTHOPAEDIC Left     shoulder arthroscopy    HX OTHER SURGICAL  3/2008    hematoma s/p Rt BR lumpectomy    HX TONSILLECTOMY  1963    T&A    MRI GUIDE BREAST BX LT Left     ME ABDOMEN SURGERY PROC UNLISTED  1998    COLECTOMY (1/2 REMOVED; \"WAS IN KNOTS\")    ME BREAST SURGERY PROCEDURE UNLISTED Right 2/2008    RIGHT BREAST LUMPECTOMY       Family History:  Family History   Problem Relation Age of Onset    Heart Disease Mother     High Cholesterol Mother     Hypertension Mother     High Cholesterol Father     Heart Disease Father     Obesity Father     Diabetes Father     Cancer Maternal Grandmother         colon    Breast Cancer Maternal Aunt     Anesth Problems Neg Hx        Social History:  Social History     Tobacco Use    Smoking status: Current Every Day Smoker     Packs/day: 1.00     Years: 45.00     Pack years: 45.00    Smokeless tobacco: Never Used   Vaping Use    Vaping Use: Never used   Substance Use Topics    Alcohol use: Yes     Comment: once a month    Drug use: No       Allergies: Allergies   Allergen Reactions    Aspirin Other (comments)     Blacked out. Talked with patient. Can take other NSAIDS like Ibuprofen, Naprosyn         Review of Systems   Review of Systems  Constitutional: Negative for fever, chills, and fatigue. HENT: Negative for congestion, sore throat, rhinorrhea, sneezing and neck stiffness   Eyes: Negative for discharge and redness. Respiratory: Negative for  shortness of breath, wheezing   Cardiovascular: Negative for chest pain, palpitations   Gastrointestinal: Negative for nausea, vomiting, abdominal pain, constipation, diarrhea and blood in stool.    Genitourinary: Negative for dysuria, hematuria, flank pain, decreased urine volume, discharge,   Musculoskeletal: Negative for myalgias or joint cyndi. Positive back pain. Skin: Negative for rash or lesions . Neurological: Negative weakness, light-headedness, numbness and headaches. Physical Exam   Physical Exam    GENERAL: alert and oriented, mild distress  EYES: PEERL, No injection, discharge or icterus. ENT: Mucous membranes pink and moist.  NECK: Supple  LUNGS: Airway patent. Non-labored respirations. Breath sounds clear with good air entry bilaterally. HEART: Regular rate and rhythm. No peripheral edema  ABDOMEN: Non-distended and non-tender, without guarding or rebound. SKIN:  warm, dry  MSK/EXTREMITIES: Without swelling, tenderness or deformity, symmetric with normal ROM. Lower back tenderness  NEUROLOGICAL: Alert, oriented. Strength 5/5 bilateral lower extremity, station intact and equal throughout to light touch. Patellar reflexes 2+ bilaterally. Diagnostic Study Results     Labs -   No results found for this or any previous visit (from the past 12 hour(s)). Radiologic Studies -   CT SPINE LUMB WO CONT   Final Result   Probable focal disc herniation in the left lateral recess at L5-S1. No acute fracture or subluxation. Fusion hardware appears intact. Degenerative   changes as detailed above. CT Results  (Last 48 hours)               06/22/21 2302  CT SPINE LUMB WO CONT Final result    Impression:  Probable focal disc herniation in the left lateral recess at L5-S1. No acute fracture or subluxation. Fusion hardware appears intact. Degenerative   changes as detailed above. Narrative:  EXAM: CT SPINE LUMB WO CONT       INDICATION: pain       COMPARISON: Back pain for 2 weeks. TECHNIQUE:   Unenhanced multislice helical CT of the lumbar spine was performed   in the axial plane. Coronal and sagittal reconstructions were obtained.   CT   dose reduction was achieved through use of a standardized protocol tailored for   this examination and automatic exposure control for dose modulation. FINDINGS:       The patient is status post L4-5 posterior fusion with pedicle screws and rods;   the hardware appears intact. There is no acute fracture or subluxation. Moderate   degenerative disc disease is present at L5-S1. There is mild bilateral neural   foraminal narrowing at L5-S1. There is a probable focal disc herniation in the   left lateral recess at L5-S1. No spinal canal stenosis is evident. Nonobstructing bilateral renal calculi are incidentally noted. CXR Results  (Last 48 hours)    None            Medical Decision Making     I, Matthew Fuentes MD am the first provider for this patient and am the attending of record for this patient encounter. I reviewed the vital signs, available nursing notes, past medical history, past surgical history, family history and social history. Vital Signs-Reviewed the patient's vital signs. No data found. Pulse Oximetry Analysis - 99% on RA      Records Reviewed: Nursing Notes and Old Medical Records    Provider Notes (Medical Decision Making): On presentation, the patient is well appearing, in no acute distress with normal vital signs. Based on my history and exam the differential diagnosis for this patient includes muscle strain, hardware malposition, radiculopathy, bulging disc, cauda equina, nerve damage, myelitis, discitis, epidural abscess. Patient having no systemic symptoms making infectious process unlikely. Patient's exam is neuro intact with strength and sensation equal.  She is having no saddle anesthesia, leg weakness, bowel/bladder dysfunction to suggest cauda equina/cord compression. Patient was given morphine with improvement in her symptoms. CT scan reviewed and did show intact hardware but noted likely bulging disc however radiologist notes no significant canal stenosis.   Seeing as patient has no neuro deficits other than her chronic intermittent leg numbness which has been unchanged for 2 weeks now and radiculopathy pain feel that she is stable for discharge as she has an appointment with her surgeon tomorrow. Patient is in agreement with this plan will go to the appointment as scheduled. ED Course:   Initial assessment performed. The patients presenting problems have been discussed, and they are in agreement with the care plan formulated and outlined with them. I have encouraged them to ask questions as they arise throughout their visit. PROGRESS  Mayra Jovel's  results have been reviewed with her. She has been counseled regarding her diagnosis. She verbally conveys understanding and agreement of the signs, symptoms, diagnosis, treatment and prognosis and additionally agrees to follow up as recommended with Dr. Jamia Becerril, RENE in 24 - 48 hours. She also agrees with the care-plan and conveys that all of her questions have been answered. I have also put together some discharge instructions for her that include: 1) educational information regarding their diagnosis, 2) how to care for their diagnosis at home, as well a 3) list of reasons why they would want to return to the ED prior to their follow-up appointment, should their condition change. Disposition:  home    PLAN:  1. Discharge Medication List as of 6/22/2021 11:50 PM      START taking these medications    Details   oxyCODONE IR (Roxicodone) 5 mg immediate release tablet Take 1 Tablet by mouth every six (6) hours as needed for Pain for up to 3 days. Max Daily Amount: 20 mg., Normal, Disp-12 Tablet, R-0         CONTINUE these medications which have NOT CHANGED    Details   TURMERIC PO Take 500 mg by mouth daily. , Historical Med      cholecalciferol (Vitamin D3) (5000 Units/125 mcg) tab tablet Take 5,000 Units by mouth daily. , Historical Med      Alpha Lipoic Acid 600 mg cap Take 600 mg by mouth daily. , Historical Med      doxycycline (MONODOX) 100 mg capsule Take 100 mg by mouth two (2) times a day. Skin disorder, Historical Med      traZODone (DESYREL) 50 mg tablet Take 100 mg by mouth nightly., Historical Med      gabapentin (NEURONTIN) 600 mg tablet Take 1,200 mg by mouth three (3) times daily. , Historical Med      acetaminophen (TYLENOL EXTRA STRENGTH) 500 mg tablet Take 1,000 mg by mouth every six (6) hours as needed for Pain., Historical Med      esomeprazole (NEXIUM) 20 mg capsule Take 20 mg by mouth daily. Indications: Heartburn, Historical Med      diclofenac (VOLTAREN) 1 % gel Apply  to affected area as needed., Historical Med      DULoxetine (CYMBALTA) 60 mg capsule TAKE 2 CAPSULES DAILY, Normal, Disp-180 Cap, R-2      diphenhydrAMINE (BENADRYL) 50 mg tablet Take 100 mg by mouth two (2) times a day. Indications: ALLERGIC REACTIONS, Historical Med      SIMVASTATIN (ZOCOR PO) Take 20 mg by mouth nightly., Historical Med           2. Follow-up Information     Follow up With Specialties Details Why Contact Info    Marylyn Siemens, NP Nurse Practitioner Schedule an appointment as soon as possible for a visit in 2 days  [address] [phone]      [facility] EMERGENCY DEPT Emergency Medicine  If symptoms worsen [address] [facility]    Linda Stearns MD Orthopedic Surgery Go in 1 day  [address]  [address] [id number] [phone]          Return to ED if worse     Diagnosis     Clinical Impression:   1. Lumbar herniated disc    2. Low back pain with left-sided sciatica, unspecified back pain laterality, unspecified chronicity        Please note that this dictation was completed with Dragon, computer voice recognition software. Quite often unanticipated grammatical, syntax, homophones, and other interpretive errors are inadvertently transcribed by the computer software. Please disregard these errors.   Additionally, please excuse any errors that have escaped final proofreading.

## 2021-06-23 NOTE — ED NOTES
Discharge information reviewed by Ester Edwards MD . Pt verbalized understanding of discharge instructions. Discharge note signed. Pt discharged without difficulty. Pt discharged in stable condition via wheelchair, accompanied by .

## 2021-06-29 ENCOUNTER — TRANSCRIBE ORDER (OUTPATIENT)
Dept: SCHEDULING | Age: 66
End: 2021-06-29

## 2021-06-29 DIAGNOSIS — M96.1 POSTLAMINECTOMY SYNDROME, LUMBAR REGION: Primary | ICD-10-CM

## 2021-06-29 DIAGNOSIS — M51.26 DISPLACEMENT OF LUMBAR INTERVERTEBRAL DISC WITHOUT MYELOPATHY: ICD-10-CM

## 2021-06-29 DIAGNOSIS — Z98.1 S/P SPINAL FUSION: ICD-10-CM

## 2021-06-29 DIAGNOSIS — M47.817 LUMBOSACRAL SPONDYLOSIS WITHOUT MYELOPATHY: ICD-10-CM

## 2021-06-29 DIAGNOSIS — M54.32 SCIATICA OF LEFT SIDE: ICD-10-CM

## 2021-06-29 DIAGNOSIS — M54.50 LUMBAR PAIN: ICD-10-CM

## 2021-07-09 ENCOUNTER — ANESTHESIA EVENT (OUTPATIENT)
Dept: ENDOSCOPY | Age: 66
End: 2021-07-09
Payer: MEDICARE

## 2021-07-09 ENCOUNTER — HOSPITAL ENCOUNTER (OUTPATIENT)
Age: 66
Setting detail: OUTPATIENT SURGERY
Discharge: HOME | End: 2021-07-09
Attending: SPECIALIST | Admitting: SPECIALIST
Payer: MEDICARE

## 2021-07-09 ENCOUNTER — ANESTHESIA (OUTPATIENT)
Dept: ENDOSCOPY | Age: 66
End: 2021-07-09
Payer: MEDICARE

## 2021-07-09 VITALS
SYSTOLIC BLOOD PRESSURE: 119 MMHG | HEART RATE: 88 BPM | DIASTOLIC BLOOD PRESSURE: 84 MMHG | OXYGEN SATURATION: 100 % | TEMPERATURE: 97.7 F | HEIGHT: 62 IN | RESPIRATION RATE: 13 BRPM | BODY MASS INDEX: 26.5 KG/M2 | WEIGHT: 144 LBS

## 2021-07-09 PROCEDURE — 74011250636 HC RX REV CODE- 250/636: Performed by: SPECIALIST

## 2021-07-09 PROCEDURE — 76060000032 HC ANESTHESIA 0.5 TO 1 HR: Performed by: SPECIALIST

## 2021-07-09 PROCEDURE — 74011250636 HC RX REV CODE- 250/636: Performed by: NURSE ANESTHETIST, CERTIFIED REGISTERED

## 2021-07-09 PROCEDURE — 2709999900 HC NON-CHARGEABLE SUPPLY: Performed by: SPECIALIST

## 2021-07-09 PROCEDURE — 88305 TISSUE EXAM BY PATHOLOGIST: CPT

## 2021-07-09 PROCEDURE — 74011000250 HC RX REV CODE- 250: Performed by: NURSE ANESTHETIST, CERTIFIED REGISTERED

## 2021-07-09 PROCEDURE — 77030021593 HC FCPS BIOP ENDOSC BSC -A: Performed by: SPECIALIST

## 2021-07-09 PROCEDURE — 76040000007: Performed by: SPECIALIST

## 2021-07-09 RX ORDER — PROPOFOL 10 MG/ML
INJECTION, EMULSION INTRAVENOUS AS NEEDED
Status: DISCONTINUED | OUTPATIENT
Start: 2021-07-09 | End: 2021-07-09 | Stop reason: HOSPADM

## 2021-07-09 RX ORDER — SODIUM CHLORIDE 0.9 % (FLUSH) 0.9 %
5-40 SYRINGE (ML) INJECTION AS NEEDED
Status: DISCONTINUED | OUTPATIENT
Start: 2021-07-09 | End: 2021-07-09 | Stop reason: HOSPADM

## 2021-07-09 RX ORDER — LIDOCAINE HYDROCHLORIDE 20 MG/ML
INJECTION, SOLUTION EPIDURAL; INFILTRATION; INTRACAUDAL; PERINEURAL AS NEEDED
Status: DISCONTINUED | OUTPATIENT
Start: 2021-07-09 | End: 2021-07-09 | Stop reason: HOSPADM

## 2021-07-09 RX ORDER — SODIUM CHLORIDE 9 MG/ML
50 INJECTION, SOLUTION INTRAVENOUS CONTINUOUS
Status: DISCONTINUED | OUTPATIENT
Start: 2021-07-09 | End: 2021-07-09 | Stop reason: HOSPADM

## 2021-07-09 RX ORDER — SODIUM CHLORIDE 0.9 % (FLUSH) 0.9 %
5-40 SYRINGE (ML) INJECTION EVERY 8 HOURS
Status: DISCONTINUED | OUTPATIENT
Start: 2021-07-09 | End: 2021-07-09 | Stop reason: HOSPADM

## 2021-07-09 RX ORDER — DEXTROMETHORPHAN/PSEUDOEPHED 2.5-7.5/.8
1.2 DROPS ORAL
Status: DISCONTINUED | OUTPATIENT
Start: 2021-07-09 | End: 2021-07-09 | Stop reason: HOSPADM

## 2021-07-09 RX ADMIN — PROPOFOL 25 MG: 10 INJECTION, EMULSION INTRAVENOUS at 12:05

## 2021-07-09 RX ADMIN — PROPOFOL 25 MG: 10 INJECTION, EMULSION INTRAVENOUS at 12:01

## 2021-07-09 RX ADMIN — SODIUM CHLORIDE 50 ML/HR: 9 INJECTION, SOLUTION INTRAVENOUS at 11:38

## 2021-07-09 RX ADMIN — LIDOCAINE HYDROCHLORIDE 50 MG: 20 INJECTION, SOLUTION EPIDURAL; INFILTRATION; INTRACAUDAL; PERINEURAL at 11:55

## 2021-07-09 RX ADMIN — PROPOFOL 50 MG: 10 INJECTION, EMULSION INTRAVENOUS at 11:55

## 2021-07-09 RX ADMIN — PROPOFOL 50 MG: 10 INJECTION, EMULSION INTRAVENOUS at 11:57

## 2021-07-09 NOTE — ANESTHESIA PREPROCEDURE EVALUATION
Anesthetic History   No history of anesthetic complications            Review of Systems / Medical History  Patient summary reviewed, nursing notes reviewed and pertinent labs reviewed    Pulmonary          Smoker (1/4 -1/2 ppd)      Comments: Seasonal allergies   Neuro/Psych         Psychiatric history (anxiety)     Cardiovascular              Hyperlipidemia  Pertinent negatives: Past MI: limited by feet.   Exercise tolerance: <4 METS: Limited by pain  Comments: 2-2021 EKG: NSR, LAE   GI/Hepatic/Renal     GERD (occasional): well controlled          Comments: Diarrhea, hx of colon polyps    Hx of colectomy 1998      Hx of C Diff 2004   Endo/Other        Arthritis (Rheumatoid & osteo) and cancer (uterine, 1982 & right breast, 2008--no lymph node work, s/p chemo, XRT)     Other Findings   Comments: Fibromyalgia  3 cervical fusions;   lumbar discectomy, fusion           Physical Exam    Airway  Mallampati: II  TM Distance: 4 - 6 cm  Neck ROM: decreased range of motion   Mouth opening: Normal     Cardiovascular    Rhythm: regular  Rate: normal      Pertinent negatives: No murmur   Dental    Dentition: Full upper dentures and Full lower dentures     Pulmonary  Breath sounds clear to auscultation               Abdominal  GI exam deferred       Other Findings            Anesthetic Plan    ASA: 3  Anesthesia type: total IV anesthesia          Induction: Intravenous  Anesthetic plan and risks discussed with: Patient

## 2021-07-09 NOTE — PROCEDURES
Colonoscopy Procedure Note    Indications:   Personal history of colon polyps (screening only)    Referring Physician: Hollie Mcfadden NP  Anesthesia/Sedation: MAC anesthesia Propofol  Endoscopist:  Dr. Lakia Uribe    Procedure in Detail:  Informed consent was obtained for the procedure, including sedation. Risks of perforation, hemorrhage, adverse drug reaction, and aspiration were discussed. The patient was placed in the left lateral decubitus position. Based on the pre-procedure assessment, including review of the patient's medical history, medications, allergies, and review of systems, she had been deemed to be an appropriate candidate for moderate sedation; she was therefore sedated with the medications listed above. The patient was monitored continuously with ECG tracing, pulse oximetry, blood pressure monitoring, and direct observations. A rectal examination was performed. The [id number] was inserted into the rectum and advanced under direct vision to the terminal ileum. The quality of the colonic preparation was adequate. A careful inspection was made as the colonoscope was withdrawn, including a retroflexed view of the rectum; findings and interventions are described below. Appropriate photodocumentation was obtained. Findings:   1. Scope advanced to the cecum and terminal ileum. 2.  There was a discrete ulcer seen in the sigmoid colon s/p Bx.  R/O NSAID vs. Ischemic vs. IBD  3. No polyps. Normal remaining mucosa in the colon and TI. S/P Bx of the TI  4. Grade 2 internal hemorrhoids. Therapies:  See above    Specimen:  Specimens were collected as described above and sent to pathology. Complications: None were encountered during the procedure. EBL: < 10 ml.     Recommendations:   -f/u path  -avoid NSAIDs  -repeat colonoscopy in 5 years    Signed By: Marie Reyes MD                        July 9, 2021

## 2021-07-09 NOTE — H&P
Gastroenterology Outpatient History and Physical    Patient: Lizzie Saenz    Physician: Marie Reyes MD    Vital Signs: Blood pressure 118/78, pulse 100, temperature 97.8 °F (36.6 °C), resp. rate 18, height 5' 2\" (1.575 m), weight 65.3 kg (144 lb), SpO2 100 %, not currently breastfeeding. Allergies: Allergies   Allergen Reactions    Aspirin Other (comments)     Blacked out. Talked with patient.  Can take other NSAIDS like Ibuprofen, Naprosyn       Chief Complaint: H/O Polyp    History of Present Illness: 64 yo WF for colonoscopy for h/o polyps    Justification for Procedure: above    History:  Past Medical History:   Diagnosis Date    Arthritis     RHEUMATOID AND OSTEOARTHRITIS    Breast cancer ([facility] [address] 2008    right    C. difficile diarrhea 2004    as of 10/23/14 pt denies any diarrhea or sx    Cancer ([facility] [address] 1982    uterine CA, hysterectomy, no chemo / radiation    Cancer ([facility] [address] 2008    Rt BR; lumpectomy, radiation with chemo pill x6 yrs    Fibromyalgia     GERD (gastroesophageal reflux disease)     Hypercholesteremia     Neuropathy     Osteoarthritis     Other ill-defined conditions(799.89)     GRANULOMA ANEULARE, SKIN DISORDER    Psychiatric disorder     ANXIETY    Rheumatoid arthritis ([facility] [address]     S/P radiation therapy 2008    right breast    Seasonal allergies       Past Surgical History:   Procedure Laterality Date    COLONOSCOPY N/A 5/25/2018    COLONOSCOPY performed by [facility]Ryes MD at [facility] ENDOSCOPY    HX APPENDECTOMY      HX CARPAL TUNNEL RELEASE Bilateral     HX CERVICAL DISKECTOMY  2/3/14    C4-5     HX COLONOSCOPY      HX GI      ENDOSCOPY (DILATATION/ESOPH)    HX HYSTERECTOMY  1982    partial    HX KNEE ARTHROSCOPY Left     x2    HX LUMBAR DISKECTOMY  06/2018    HX MOHS PROCEDURES Right     HX ORTHOPAEDIC  2005, 2011    CERVICAL NECKx 3    HX ORTHOPAEDIC Bilateral 1988    carpal tunnel repair, and bilater knee arthroscopy    HX ORTHOPAEDIC Left     shoulder arthroscopy    HX OTHER SURGICAL  3/2008    hematoma s/p Rt BR lumpectomy    HX TONSILLECTOMY  1963    T&A    MRI GUIDE BREAST BX LT Left     [facility]BD[facility] UNLISTED  1998    COLECTOMY (1/2 REMOVED; \"WAS IN KNOTS\")    [facility] BREAST SURGERY PROCEDURE UNLISTED Right 2/2008    RIGHT BREAST LUMPECTOMY      Social History     Socioeconomic History    Marital status:      Spouse name: Not on file    Number of children: Not on file    Years of education: Not on file    Highest education level: Not on file   Tobacco Use    Smoking status: Current Every Day Smoker     Packs/day: 0.50     Years: 45.00     Pack years: 22.50    Smokeless tobacco: Never Used   Vaping Use    Vaping Use: Never used   Substance and Sexual Activity    Alcohol use: Yes     Comment: once a month    Drug use: No     Social Determinants of Health     Financial Resource Strain:     Difficulty of Paying Living Expenses:    Food Insecurity:     Worried About Running Out of Food in the Last Year:     Ran Out of Food in the Last Year:    Transportation Needs:     Lack of Transportation (Medical):      Lack of Transportation (Non-Medical):    Physical Activity:     Days of Exercise per Week:     Minutes of Exercise per Session:    Stress:     Feeling of Stress :    Social Connections:     Frequency of Communication with Friends and Family:     Frequency of Social Gatherings with Friends and Family:     Attends Faith Services:     Active Member of Clubs or Organizations:     Attends Club or Organization Meetings:     Marital Status:       Family History   Problem Relation Age of Onset    Heart Disease Mother     High Cholesterol Mother     Hypertension Mother     High Cholesterol Father     Heart Disease Father     Obesity Father     Diabetes Father     Cancer Maternal Grandmother         colon    Breast Cancer Maternal Aunt     Anesth Problems Neg Hx        Medications:   Prior to Admission medications Medication Sig Start Date End Date Taking? Authorizing Provider   cholecalciferol (Vitamin D3) (5000 Units/125 mcg) tab tablet Take 5,000 Units by mouth daily. Yes Provider, Historical   doxycycline (MONODOX) 100 mg capsule Take 100 mg by mouth two (2) times a day. Skin disorder   Yes Provider, Historical   traZODone (DESYREL) 50 mg tablet Take 100 mg by mouth nightly. Yes Provider, Historical   gabapentin (NEURONTIN) 600 mg tablet Take 1,200 mg by mouth three (3) times daily. Yes Provider, Historical   acetaminophen (TYLENOL EXTRA STRENGTH) 500 mg tablet Take 1,000 mg by mouth every six (6) hours as needed for Pain. Yes Provider, Historical   esomeprazole (NEXIUM) 20 mg capsule Take 20 mg by mouth daily. Indications: Heartburn   Yes Provider, Historical   diclofenac (VOLTAREN) 1 % gel Apply  to affected area as needed. Yes Provider, Historical   DULoxetine (CYMBALTA) 60 mg capsule TAKE 2 CAPSULES DAILY  Patient taking differently: Take 60 mg by mouth two (2) times a day. 1/30/17  Yes Clement Chandler MD   diphenhydrAMINE (BENADRYL) 50 mg tablet Take 100 mg by mouth two (2) times a day. Indications: ALLERGIC REACTIONS   Yes Provider, Historical   SIMVASTATIN (ZOCOR PO) Take 40 mg by mouth nightly. Yes Other, MD Bertha   TURMERIC PO Take 500 mg by mouth daily. Patient not taking: Reported on 7/9/2021    Provider, Historical   Alpha Lipoic Acid 600 mg cap Take 600 mg by mouth daily. Patient not taking: Reported on 7/9/2021    Provider, Historical       Physical Exam:   General: alert, no distress   HEENT: Head: Normocephalic, no lesions, without obvious abnormality.    Heart: regular rate and rhythm, S1, S2 normal, no murmur, click, rub or gallop   Lungs: chest clear, no wheezing, rales, normal symmetric air entry   Abdominal: soft, NT/ND+ BS   Neurological: Grossly normal   Extremities: extremities normal, atraumatic, no cyanosis or edema     Findings/Diagnosis: Colon Polyp    Plan of Care/Planned Procedure: Colonoscopy

## 2021-07-09 NOTE — ANESTHESIA POSTPROCEDURE EVALUATION
Procedure(s):  COLONOSCOPY  COLON BIOPSY. total IV anesthesia    Anesthesia Post Evaluation        Patient location during evaluation: PACU  Note status: Adequate. Level of consciousness: responsive to verbal stimuli and sleepy but conscious  Pain management: satisfactory to patient  Airway patency: patent  Anesthetic complications: no  Cardiovascular status: acceptable  Respiratory status: acceptable  Hydration status: acceptable  Comments: +Post-Anesthesia Evaluation and Assessment    Patient: Tamiko Spencer MRN: [id number]  SSN: [id number]   YOB: 1955  Age: 63 y.o. Sex: female      Cardiovascular Function/Vital Signs    /69   Pulse 82   Temp 36.5 °C (97.7 °F)   Resp 12   Ht 5' 2\" (1.575 m)   Wt 65.3 kg (144 lb)   SpO2 100%   Breastfeeding No   BMI 26.34 kg/m²     Patient is status post Procedure(s):  COLONOSCOPY  COLON BIOPSY. Nausea/Vomiting: Controlled. Postoperative hydration reviewed and adequate. Pain:  Pain Scale 1: Numeric (0 - 10) (07/09/21 1220)  Pain Intensity 1: 0 (07/09/21 1220)   Managed. Neurological Status: At baseline. Mental Status and Level of Consciousness: Arousable. Pulmonary Status:   O2 Device: None (Room air) (07/09/21 1220)   Adequate oxygenation and airway patent. Complications related to anesthesia: None    Post-anesthesia assessment completed. No concerns. Signed By: Corinna Hardwick DO    7/9/2021  Post anesthesia nausea and vomiting:  controlled      INITIAL Post-op Vital signs:   Vitals Value Taken Time   /69 07/09/21 1221   Temp 36.5 °C (97.7 °F) 07/09/21 1220   Pulse 84 07/09/21 1223   Resp 15 07/09/21 1223   SpO2 100 % 07/09/21 1223   Vitals shown include unvalidated device data.

## 2021-07-09 NOTE — DISCHARGE INSTRUCTIONS
Jazmyn Vivas  [id number]  1955    COLON DISCHARGE INSTRUCTIONS  Discomfort:  Redness at IV site- apply warm compress to area; if redness or soreness persist- contact your physician  There may be a slight amount of blood passed from the rectum  Gaseous discomfort- walking, belching will help relieve any discomfort  You may not operate a vehicle for 12 hours  You may not engage in an occupation involving machinery or appliances for rest of today  You may not drink alcoholic beverages for at least 12 hours  Avoid making any critical decisions for at least 24 hour  DIET:   Regular diet. - however -  remember your colon is empty and a heavy meal will produce gas. Avoid these foods:  vegetables, fried / greasy foods, carbonated drinks for today. MEDICATIONS:        Regarding Aspirin or Nonsteroidal medications, please see below. ACTIVITY:  You may resume your normal daily activities it is recommended that you spend the remainder of the day resting -  avoid any strenuous activity. CALL M.D. ANY SIGN OF:  Increasing pain, nausea, vomiting  Abdominal distension (swelling)  New increased bleeding (oral or rectal)  Fever (chills)  Pain in chest area  Bloody discharge from nose or mouth  Shortness of breath    Tylenol as needed for pain.       Follow-up Instructions:   Call Dr. Linda Osorio for results of procedure / biopsy in 4-5 days at telephone #  [phone]              Repeat Colonoscopy in 3-5 years

## 2021-07-09 NOTE — ROUTINE PROCESS
Carroll Dangelo  1955  [id number]    Situation:  Verbal report received from: Nereyda  Procedure: Procedure(s):  COLONOSCOPY  COLON BIOPSY    Background:    Preoperative diagnosis: DIARRHEA, PERSONAL HISTORY POLYPS  Postoperative diagnosis: Sigmoid ulcer, hemorrhoids    :  Dr. Halina Gonzalez  Assistant(s): Endoscopy Technician-1: Shaggy Mckeon  Endoscopy RN-1: Babetta Fothergill, RN    Specimens:   ID Type Source Tests Collected by Time Destination   1 : Sigmoid colon ulcer bx Preservative Sigmoid  Dyllan Waller MD 7/9/2021 1201 Pathology   2 : Ileum bx Preservative Ileum  Dyllan Waller MD 7/9/2021 1203 Pathology     H. Pylori  no    Assessment:  Intra-procedure medications     Anesthesia gave intra-procedure sedation and medications, see anesthesia flow sheet yes    Intravenous fluids: NS@ KVO     Vital signs stable       Abdominal assessment: round and soft     Recommendation:  Discharge patient per MD order.   Return to floor  Family or Friend   Permission to share finding with family or friend yes

## 2021-07-09 NOTE — PROGRESS NOTES
Anesthesia reports 150mg Propofol, 25mg Lidocaine and 400mL NS given during procedure. Received report from anesthesia staff on vital signs and status of patient.

## 2021-07-11 ENCOUNTER — HOSPITAL ENCOUNTER (OUTPATIENT)
Dept: MRI IMAGING | Age: 66
Discharge: HOME | End: 2021-07-11
Attending: ORTHOPAEDIC SURGERY
Payer: MEDICARE

## 2021-07-11 DIAGNOSIS — M54.50 LUMBAR PAIN: ICD-10-CM

## 2021-07-11 DIAGNOSIS — Z98.1 S/P SPINAL FUSION: ICD-10-CM

## 2021-07-11 DIAGNOSIS — M54.32 SCIATICA OF LEFT SIDE: ICD-10-CM

## 2021-07-11 DIAGNOSIS — M47.817 LUMBOSACRAL SPONDYLOSIS WITHOUT MYELOPATHY: ICD-10-CM

## 2021-07-11 DIAGNOSIS — M51.26 DISPLACEMENT OF LUMBAR INTERVERTEBRAL DISC WITHOUT MYELOPATHY: ICD-10-CM

## 2021-07-11 DIAGNOSIS — M96.1 POSTLAMINECTOMY SYNDROME, LUMBAR REGION: ICD-10-CM

## 2021-07-11 PROCEDURE — 72158 MRI LUMBAR SPINE W/O & W/DYE: CPT

## 2021-07-11 PROCEDURE — A9576 INJ PROHANCE MULTIPACK: HCPCS | Performed by: ORTHOPAEDIC SURGERY

## 2021-07-11 PROCEDURE — 74011636320 HC RX REV CODE- 636/320: Performed by: ORTHOPAEDIC SURGERY

## 2021-07-11 RX ADMIN — GADOTERIDOL 13 ML: 279.3 INJECTION, SOLUTION INTRAVENOUS at 09:42

## 2021-07-15 ENCOUNTER — APPOINTMENT (OUTPATIENT)
Dept: GENERAL RADIOLOGY | Age: 66
DRG: 871 | End: 2021-07-15
Attending: STUDENT IN AN ORGANIZED HEALTH CARE EDUCATION/TRAINING PROGRAM
Payer: MEDICARE

## 2021-07-15 ENCOUNTER — APPOINTMENT (OUTPATIENT)
Dept: CT IMAGING | Age: 66
DRG: 871 | End: 2021-07-15
Attending: STUDENT IN AN ORGANIZED HEALTH CARE EDUCATION/TRAINING PROGRAM
Payer: MEDICARE

## 2021-07-15 ENCOUNTER — APPOINTMENT (OUTPATIENT)
Dept: MRI IMAGING | Age: 66
DRG: 871 | End: 2021-07-15
Attending: INTERNAL MEDICINE
Payer: MEDICARE

## 2021-07-15 ENCOUNTER — HOSPITAL ENCOUNTER (INPATIENT)
Age: 66
LOS: 5 days | Discharge: HOME HEALTH | DRG: 871 | End: 2021-07-20
Attending: EMERGENCY MEDICINE | Admitting: INTERNAL MEDICINE
Payer: MEDICARE

## 2021-07-15 DIAGNOSIS — K52.9 CHRONIC DIARRHEA: ICD-10-CM

## 2021-07-15 DIAGNOSIS — N39.0 URINARY TRACT INFECTION WITHOUT HEMATURIA, SITE UNSPECIFIED: Primary | ICD-10-CM

## 2021-07-15 DIAGNOSIS — R25.1 TREMORS OF NERVOUS SYSTEM: ICD-10-CM

## 2021-07-15 DIAGNOSIS — Z79.899 POLYPHARMACY: ICD-10-CM

## 2021-07-15 DIAGNOSIS — M54.16 LUMBAR RADICULOPATHY: ICD-10-CM

## 2021-07-15 DIAGNOSIS — G62.9 PERIPHERAL POLYNEUROPATHY: ICD-10-CM

## 2021-07-15 DIAGNOSIS — R13.10 DYSPHAGIA, UNSPECIFIED TYPE: ICD-10-CM

## 2021-07-15 PROBLEM — G93.41 ACUTE METABOLIC ENCEPHALOPATHY: Status: ACTIVE | Noted: 2021-07-15

## 2021-07-15 LAB
ALBUMIN SERPL-MCNC: 2.6 G/DL (ref 3.5–5)
ALBUMIN/GLOB SERPL: 0.7 {RATIO} (ref 1.1–2.2)
ALP SERPL-CCNC: 89 U/L (ref 45–117)
ALT SERPL-CCNC: 27 U/L (ref 12–78)
AMORPH CRY URNS QL MICRO: ABNORMAL
ANION GAP SERPL CALC-SCNC: 7 MMOL/L (ref 5–15)
APPEARANCE UR: ABNORMAL
AST SERPL-CCNC: 19 U/L (ref 15–37)
ATRIAL RATE: 108 BPM
BACTERIA URNS QL MICRO: ABNORMAL /HPF
BASOPHILS # BLD: 0 K/UL (ref 0–0.1)
BASOPHILS NFR BLD: 0 % (ref 0–1)
BILIRUB SERPL-MCNC: 0.5 MG/DL (ref 0.2–1)
BILIRUB UR QL CFM: NEGATIVE
BUN SERPL-MCNC: 23 MG/DL (ref 6–20)
BUN/CREAT SERPL: 12 (ref 12–20)
CALCIUM SERPL-MCNC: 8.6 MG/DL (ref 8.5–10.1)
CALCULATED P AXIS, ECG09: 52 DEGREES
CALCULATED R AXIS, ECG10: 38 DEGREES
CALCULATED T AXIS, ECG11: 52 DEGREES
CHLORIDE SERPL-SCNC: 102 MMOL/L (ref 97–108)
CO2 SERPL-SCNC: 23 MMOL/L (ref 21–32)
COLOR UR: ABNORMAL
CREAT SERPL-MCNC: 1.94 MG/DL (ref 0.55–1.02)
DIAGNOSIS, 93000: NORMAL
DIFFERENTIAL METHOD BLD: ABNORMAL
EOSINOPHIL # BLD: 0 K/UL (ref 0–0.4)
EOSINOPHIL NFR BLD: 0 % (ref 0–7)
EPITH CASTS URNS QL MICRO: ABNORMAL /LPF
ERYTHROCYTE [DISTWIDTH] IN BLOOD BY AUTOMATED COUNT: 12.9 % (ref 11.5–14.5)
GLOBULIN SER CALC-MCNC: 3.9 G/DL (ref 2–4)
GLUCOSE SERPL-MCNC: 99 MG/DL (ref 65–100)
GLUCOSE UR STRIP.AUTO-MCNC: NEGATIVE MG/DL
GRAN CASTS URNS QL MICRO: ABNORMAL /LPF
HCT VFR BLD AUTO: 36.2 % (ref 35–47)
HGB BLD-MCNC: 11.9 G/DL (ref 11.5–16)
HGB UR QL STRIP: ABNORMAL
IMM GRANULOCYTES # BLD AUTO: 0.1 K/UL (ref 0–0.04)
IMM GRANULOCYTES NFR BLD AUTO: 1 % (ref 0–0.5)
KETONES UR QL STRIP.AUTO: NEGATIVE MG/DL
LACTATE SERPL-SCNC: 1.3 MMOL/L (ref 0.4–2)
LEUKOCYTE ESTERASE UR QL STRIP.AUTO: ABNORMAL
LYMPHOCYTES # BLD: 0.8 K/UL (ref 0.8–3.5)
LYMPHOCYTES NFR BLD: 6 % (ref 12–49)
MCH RBC QN AUTO: 30.9 PG (ref 26–34)
MCHC RBC AUTO-ENTMCNC: 32.9 G/DL (ref 30–36.5)
MCV RBC AUTO: 94 FL (ref 80–99)
MONOCYTES # BLD: 0.9 K/UL (ref 0–1)
MONOCYTES NFR BLD: 6 % (ref 5–13)
MUCOUS THREADS URNS QL MICRO: ABNORMAL /LPF
NEUTS SEG # BLD: 13.2 K/UL (ref 1.8–8)
NEUTS SEG NFR BLD: 87 % (ref 32–75)
NITRITE UR QL STRIP.AUTO: NEGATIVE
NRBC # BLD: 0 K/UL (ref 0–0.01)
NRBC BLD-RTO: 0 PER 100 WBC
P-R INTERVAL, ECG05: 142 MS
PH UR STRIP: 5 [PH] (ref 5–8)
PLATELET # BLD AUTO: 173 K/UL (ref 150–400)
PMV BLD AUTO: 10.2 FL (ref 8.9–12.9)
POTASSIUM SERPL-SCNC: 3.8 MMOL/L (ref 3.5–5.1)
PROT SERPL-MCNC: 6.5 G/DL (ref 6.4–8.2)
PROT UR STRIP-MCNC: 100 MG/DL
Q-T INTERVAL, ECG07: 332 MS
QRS DURATION, ECG06: 74 MS
QTC CALCULATION (BEZET), ECG08: 444 MS
RBC # BLD AUTO: 3.85 M/UL (ref 3.8–5.2)
RBC #/AREA URNS HPF: ABNORMAL /HPF (ref 0–5)
SODIUM SERPL-SCNC: 132 MMOL/L (ref 136–145)
SP GR UR REFRACTOMETRY: 1.02 (ref 1–1.03)
TROPONIN I SERPL-MCNC: <0.05 NG/ML
UROBILINOGEN UR QL STRIP.AUTO: 1 EU/DL (ref 0.2–1)
VENTRICULAR RATE, ECG03: 108 BPM
WBC # BLD AUTO: 15.1 K/UL (ref 3.6–11)
WBC URNS QL MICRO: ABNORMAL /HPF (ref 0–4)

## 2021-07-15 PROCEDURE — 99285 EMERGENCY DEPT VISIT HI MDM: CPT

## 2021-07-15 PROCEDURE — 81001 URINALYSIS AUTO W/SCOPE: CPT

## 2021-07-15 PROCEDURE — 87040 BLOOD CULTURE FOR BACTERIA: CPT

## 2021-07-15 PROCEDURE — 74011250637 HC RX REV CODE- 250/637: Performed by: STUDENT IN AN ORGANIZED HEALTH CARE EDUCATION/TRAINING PROGRAM

## 2021-07-15 PROCEDURE — 96361 HYDRATE IV INFUSION ADD-ON: CPT

## 2021-07-15 PROCEDURE — 65660000000 HC RM CCU STEPDOWN

## 2021-07-15 PROCEDURE — 74011250637 HC RX REV CODE- 250/637: Performed by: INTERNAL MEDICINE

## 2021-07-15 PROCEDURE — 87186 SC STD MICRODIL/AGAR DIL: CPT

## 2021-07-15 PROCEDURE — 74011250636 HC RX REV CODE- 250/636: Performed by: INTERNAL MEDICINE

## 2021-07-15 PROCEDURE — 96360 HYDRATION IV INFUSION INIT: CPT

## 2021-07-15 PROCEDURE — 84484 ASSAY OF TROPONIN QUANT: CPT

## 2021-07-15 PROCEDURE — 80053 COMPREHEN METABOLIC PANEL: CPT

## 2021-07-15 PROCEDURE — 83605 ASSAY OF LACTIC ACID: CPT

## 2021-07-15 PROCEDURE — 74011250636 HC RX REV CODE- 250/636: Performed by: STUDENT IN AN ORGANIZED HEALTH CARE EDUCATION/TRAINING PROGRAM

## 2021-07-15 PROCEDURE — 36415 COLL VENOUS BLD VENIPUNCTURE: CPT

## 2021-07-15 PROCEDURE — 70551 MRI BRAIN STEM W/O DYE: CPT

## 2021-07-15 PROCEDURE — 85025 COMPLETE CBC W/AUTO DIFF WBC: CPT

## 2021-07-15 PROCEDURE — 87086 URINE CULTURE/COLONY COUNT: CPT

## 2021-07-15 PROCEDURE — 70450 CT HEAD/BRAIN W/O DYE: CPT

## 2021-07-15 PROCEDURE — 74011000258 HC RX REV CODE- 258: Performed by: STUDENT IN AN ORGANIZED HEALTH CARE EDUCATION/TRAINING PROGRAM

## 2021-07-15 PROCEDURE — 71045 X-RAY EXAM CHEST 1 VIEW: CPT

## 2021-07-15 PROCEDURE — 93005 ELECTROCARDIOGRAM TRACING: CPT

## 2021-07-15 PROCEDURE — 87077 CULTURE AEROBIC IDENTIFY: CPT

## 2021-07-15 RX ORDER — GABAPENTIN 300 MG/1
1200 CAPSULE ORAL ONCE
Status: DISCONTINUED | OUTPATIENT
Start: 2021-07-15 | End: 2021-07-15

## 2021-07-15 RX ORDER — SODIUM CHLORIDE 9 MG/ML
75 INJECTION, SOLUTION INTRAVENOUS CONTINUOUS
Status: DISCONTINUED | OUTPATIENT
Start: 2021-07-15 | End: 2021-07-20 | Stop reason: HOSPADM

## 2021-07-15 RX ORDER — ACETAMINOPHEN 325 MG/1
650 TABLET ORAL
Status: DISCONTINUED | OUTPATIENT
Start: 2021-07-15 | End: 2021-07-20 | Stop reason: HOSPADM

## 2021-07-15 RX ORDER — SODIUM CHLORIDE 0.9 % (FLUSH) 0.9 %
5-40 SYRINGE (ML) INJECTION AS NEEDED
Status: DISCONTINUED | OUTPATIENT
Start: 2021-07-15 | End: 2021-07-20 | Stop reason: HOSPADM

## 2021-07-15 RX ORDER — ONDANSETRON 4 MG/1
4 TABLET, ORALLY DISINTEGRATING ORAL
Status: DISCONTINUED | OUTPATIENT
Start: 2021-07-15 | End: 2021-07-20 | Stop reason: HOSPADM

## 2021-07-15 RX ORDER — ERGOCALCIFEROL 1.25 MG/1
5000 CAPSULE ORAL
COMMUNITY

## 2021-07-15 RX ORDER — ACETAMINOPHEN 650 MG/1
650 SUPPOSITORY RECTAL
Status: DISCONTINUED | OUTPATIENT
Start: 2021-07-15 | End: 2021-07-20 | Stop reason: HOSPADM

## 2021-07-15 RX ORDER — SODIUM CHLORIDE 0.9 % (FLUSH) 0.9 %
5-40 SYRINGE (ML) INJECTION EVERY 8 HOURS
Status: DISCONTINUED | OUTPATIENT
Start: 2021-07-15 | End: 2021-07-20 | Stop reason: HOSPADM

## 2021-07-15 RX ORDER — KETOROLAC TROMETHAMINE 30 MG/ML
15 INJECTION, SOLUTION INTRAMUSCULAR; INTRAVENOUS
Status: DISCONTINUED | OUTPATIENT
Start: 2021-07-15 | End: 2021-07-15

## 2021-07-15 RX ORDER — POLYETHYLENE GLYCOL 3350 17 G/17G
17 POWDER, FOR SOLUTION ORAL DAILY PRN
Status: DISCONTINUED | OUTPATIENT
Start: 2021-07-15 | End: 2021-07-20 | Stop reason: HOSPADM

## 2021-07-15 RX ORDER — PANTOPRAZOLE SODIUM 40 MG/1
40 TABLET, DELAYED RELEASE ORAL
Status: DISCONTINUED | OUTPATIENT
Start: 2021-07-16 | End: 2021-07-20 | Stop reason: HOSPADM

## 2021-07-15 RX ORDER — KETOROLAC TROMETHAMINE 30 MG/ML
30 INJECTION, SOLUTION INTRAMUSCULAR; INTRAVENOUS ONCE
Status: COMPLETED | OUTPATIENT
Start: 2021-07-15 | End: 2021-07-15

## 2021-07-15 RX ORDER — GABAPENTIN 300 MG/1
600 CAPSULE ORAL 2 TIMES DAILY
Status: DISCONTINUED | OUTPATIENT
Start: 2021-07-16 | End: 2021-07-17

## 2021-07-15 RX ORDER — DULOXETIN HYDROCHLORIDE 30 MG/1
60 CAPSULE, DELAYED RELEASE ORAL 2 TIMES DAILY
Status: DISCONTINUED | OUTPATIENT
Start: 2021-07-15 | End: 2021-07-20 | Stop reason: HOSPADM

## 2021-07-15 RX ORDER — ONDANSETRON 2 MG/ML
4 INJECTION INTRAMUSCULAR; INTRAVENOUS
Status: DISCONTINUED | OUTPATIENT
Start: 2021-07-15 | End: 2021-07-20 | Stop reason: HOSPADM

## 2021-07-15 RX ORDER — GABAPENTIN 300 MG/1
600 CAPSULE ORAL ONCE
Status: COMPLETED | OUTPATIENT
Start: 2021-07-15 | End: 2021-07-15

## 2021-07-15 RX ORDER — ATORVASTATIN CALCIUM 20 MG/1
20 TABLET, FILM COATED ORAL DAILY
Status: DISCONTINUED | OUTPATIENT
Start: 2021-07-16 | End: 2021-07-20 | Stop reason: HOSPADM

## 2021-07-15 RX ORDER — ENOXAPARIN SODIUM 100 MG/ML
30 INJECTION SUBCUTANEOUS DAILY
Status: DISCONTINUED | OUTPATIENT
Start: 2021-07-16 | End: 2021-07-20 | Stop reason: HOSPADM

## 2021-07-15 RX ADMIN — SODIUM CHLORIDE 1000 ML: 9 INJECTION, SOLUTION INTRAVENOUS at 18:38

## 2021-07-15 RX ADMIN — KETOROLAC TROMETHAMINE 30 MG: 30 INJECTION, SOLUTION INTRAMUSCULAR; INTRAVENOUS at 19:36

## 2021-07-15 RX ADMIN — DULOXETINE 60 MG: 30 CAPSULE, DELAYED RELEASE ORAL at 21:30

## 2021-07-15 RX ADMIN — GABAPENTIN 600 MG: 300 CAPSULE ORAL at 21:32

## 2021-07-15 RX ADMIN — SODIUM CHLORIDE 100 ML/HR: 9 INJECTION, SOLUTION INTRAVENOUS at 20:31

## 2021-07-15 RX ADMIN — SODIUM CHLORIDE 1000 ML: 9 INJECTION, SOLUTION INTRAVENOUS at 16:16

## 2021-07-15 RX ADMIN — CEFTRIAXONE 1 G: 1 INJECTION, POWDER, FOR SOLUTION INTRAMUSCULAR; INTRAVENOUS at 20:09

## 2021-07-15 NOTE — ED NOTES
Bedside shift change report given to Corrinne Lies, RN (oncoming nurse) by Atul Krause RN (offgoing nurse). Report included the following information SBAR, Kardex, ED Summary, [facility] and Recent Results. 7:30 PM: MD made aware of VS. Orders received. 7:34 PM: Holding abx till cultures are obtained per MD.       8:41 PM: MRI sheet completed and faxed. Pt able to answer orientation questions at this time. 8:51 PM: Pt to MRI at this time. 9:29 PM: In report, this RN was told that the pt had failed her dysphagia screening and was somewhat altered. Pt was found to have elevated temperature and was medicated for this. At this time, pt more alert and able to answer orientation questions appropriately. Pt re-screened for dysphagia. Pt able to complete dysphagia screen without difficulty. 1:24 AM: Pt found to have elevated temperature and increased tachycardia. Pt still alert and oriented. Provider made aware and she reports to give tylenol and notify if HR does no improve. 12: Provider made aware of pt HR and VS.     3:59 AM: Provider notified of pt hgb.     7:17 AM: Bedside shift change report given to Francisco Castaneda RN (oncoming nurse) by Paulina Power RN (offgoing nurse). Report included the following information SBAR, Kardex, ED Summary, [facility] and Recent Results.

## 2021-07-15 NOTE — ED NOTES
Assumed care of pt, EKG performed at bedside, pt placed on monitor x 3.     1530 MD at bedside     1600 Pt assisted to bedside commode, pt unsteady on her feet. Pt reporting unsteadiness, fatigue, decreased sensation/\"numbness\" in her arms and legs and unsteadiness x \"few months. \" Pt reporting Buell Spatz has seen her PCP and neurologist but has not told them\" about these sx. 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 Pt reporting she had an appointment with Dr Mj Lam who referred her to ED for sx. Pt reporting new onset of \"flinching\" and \"jerking\", nonpurposeful movements noted to trunk, RUE and RLE     1730 Pt reporting persistent non-purposeful movements in \"hands, arms and legs\" as well as 5/10 pain in her arms, legs and back that she describes as pressure. Pt stating she is not interested in pain medications. 1800 MD notified of pt tachycardia and complaints listed above. 1830 Dysphagia screen failed, pt was unable to hold/drink from cu effectively and coughed much of the sip of water back up immediately. Per  Buell Spatz has been dropping things all week\" and \"does not take pills well\".  Will notify MD

## 2021-07-15 NOTE — ED PROVIDER NOTES
EMERGENCY DEPARTMENT HISTORY AND PHYSICAL EXAM          Date: 7/15/2021  Patient Name: Mary Hamilton  Attending of Record: Dr. Chikis Kamara    History of Presenting Illness     Chief Complaint   Patient presents with    Fatigue     Into triage via wheelchair with vague c/o including: lethargy, balance difficulties, and \"taking longer than normal to do things. \" Symptoms x several months. History Provided By: Patient    HPI: aMry Hamilton is a 63 y.o. female, with past medical history of lumbar radiculopathy, peripheral neuropathy, and fibromyalgia who comes in complaining of moderate, progressively worsening generalized fatigue, bilateral upper and lower numbness, and decreased concentration ongoing for the past several weeks to month. Per , patient was seen by her primary care provider a few days ago and were directed to come to the ED to evaluate for any concerns of strokes or MI.  states that patient has been intermittently dropping things for months. Patient notes increased lethargy over the past two days, notes fevers last night highest at 100.4. Denies any coughs, chest pain, shortness of breath, abdominal pain, nausea, vomiting, diarrhea, or any urinary symptoms. Patient follows with neurology for her radiculopathy but has not been seen for any of these symptoms. States that she has been taking her prescribed Gabapentin, reports taking 6 tablets of her 600 mg Gabapentin daily which has been taking for like that for \"a while\". PCP: Hurman Osgood, NP    There are no other complaints, changes, or physical findings at this time.      Current Facility-Administered Medications   Medication Dose Route Frequency Provider Last Rate Last Admin    DULoxetine (CYMBALTA) capsule 60 mg  60 mg Oral BID Alan Carbajal MD   60 mg at 07/15/21 2130    [START ON 7/16/2021] pantoprazole (PROTONIX) tablet 40 mg  40 mg Oral ACB Alan Carbajal MD        [START ON 7/16/2021] gabapentin (NEURONTIN) capsule 600 mg  600 mg Oral BID Tish Cobb MD        [START ON 7/16/2021] atorvastatin (LIPITOR) tablet 20 mg  20 mg Oral DAILY Golla, Merril Hatchet, MD        sodium chloride (NS) flush 5-40 mL  5-40 mL IntraVENous Q8H Golla, Merril Hatchet, MD        sodium chloride (NS) flush 5-40 mL  5-40 mL IntraVENous PRN Tish Cobb MD        acetaminophen (TYLENOL) tablet 650 mg  650 mg Oral Q6H PRN Tish Cobb MD        Or   [employer] acetaminophen (TYLENOL) suppository 650 mg  650 mg Rectal Q6H PRN Tish Cobb MD        polyethylene glycol (MIRALAX) packet 17 g  17 g Oral DAILY PRN Tish Cobb MD        ondansetron (ZOFRAN ODT) tablet 4 mg  4 mg Oral Q8H PRN Tish Cobb MD        Or    [facility] 4 mg  4 mg IntraVENous Q6H PRN Tish Cobb MD        [START ON 7/16/2021] enoxaparin (LOVENOX) injection 30 mg  30 mg SubCUTAneous DAILY Golla, Merril Hatchet, MD        0.9% sodium chloride infusion  100 mL/hr IntraVENous CONTINUOUS Tish Cobb  mL/hr at 07/15/21 2031 100 mL/hr at 07/15/21 2031    [START ON 7/16/2021] cefTRIAXone (ROCEPHIN) 1 g in 0.9% sodium chloride (MBP/ADV) 50 mL MBP  1 g IntraVENous Q24H Tish Cobb MD         Current Outpatient Medications   Medication Sig Dispense Refill    ergocalciferol (Vitamin D2) 1,250 mcg (50,000 unit) capsule Take 5,000 Units by mouth.  TURMERIC PO Take 500 mg by mouth daily. (Patient not taking: Reported on 7/9/2021)      cholecalciferol (Vitamin D3) (5000 Units/125 mcg) tab tablet Take 5,000 Units by mouth daily.  Alpha Lipoic Acid 600 mg cap Take 600 mg by mouth daily. (Patient not taking: Reported on 7/9/2021)      doxycycline (MONODOX) 100 mg capsule Take 100 mg by mouth two (2) times a day. Skin disorder      traZODone (DESYREL) 50 mg tablet Take 100 mg by mouth nightly.  gabapentin (NEURONTIN) 600 mg tablet Take 1,200 mg by mouth three (3) times daily.       acetaminophen (TYLENOL EXTRA STRENGTH) 500 mg tablet Take 1,000 mg by mouth every six (6) hours as needed for Pain.  esomeprazole (NEXIUM) 20 mg capsule Take 20 mg by mouth daily. Indications: Heartburn      diclofenac (VOLTAREN) 1 % gel Apply  to affected area as needed.  DULoxetine (CYMBALTA) 60 mg capsule TAKE 2 CAPSULES DAILY (Patient taking differently: Take 60 mg by mouth two (2) times a day.) 180 Cap 2    diphenhydrAMINE (BENADRYL) 50 mg tablet Take 100 mg by mouth two (2) times a day. Indications: ALLERGIC REACTIONS      SIMVASTATIN (ZOCOR PO) Take 40 mg by mouth nightly.          Past History     Past Medical History:  Past Medical History:   Diagnosis Date    Arthritis     RHEUMATOID AND OSTEOARTHRITIS    Breast cancer ([facility] 2008    right    C. difficile diarrhea 2004    as of 10/23/14 pt denies any diarrhea or sx    Cancer ([facility] [address] 1982    uterine CA, hysterectomy, no chemo / radiation    Cancer ([facility] [address] 2008    Rt BR; lumpectomy, radiation with chemo pill x6 yrs    Fibromyalgia     GERD (gastroesophageal reflux disease)     Hypercholesteremia     Neuropathy     Osteoarthritis     Other ill-defined conditions(799.89)     GRANULOMA ANEULARE, SKIN DISORDER    Psychiatric disorder     ANXIETY    Rheumatoid arthritis ([facility] [address]     S/P radiation therapy 2008    right breast    Seasonal allergies        Past Surgical History:  Past Surgical History:   Procedure Laterality Date    COLONOSCOPY N/A 5/25/2018    COLONOSCOPY performed by Maribel Hackett MD at [facility] ENDOSCOPY    COLONOSCOPY N/A 7/9/2021    COLONOSCOPY performed by Kashif Castellanos MD at [facility] ENDOSCOPY    HX APPENDECTOMY      HX CARPAL TUNNEL RELEASE Bilateral     HX CERVICAL DISKECTOMY  2/3/14    C4-5     HX COLONOSCOPY      HX GI      ENDOSCOPY (DILATATION/ESOPH)    HX HYSTERECTOMY  1982    partial    HX KNEE ARTHROSCOPY Left     x2    HX LUMBAR DISKECTOMY  06/2018    HX MOHS PROCEDURES Right     HX ORTHOPAEDIC  2005, 2011    CERVICAL NECKx 3    HX ORTHOPAEDIC Bilateral 1988    carpal tunnel repair, and bilater knee arthroscopy    HX ORTHOPAEDIC Left     shoulder arthroscopy    HX OTHER SURGICAL  3/2008    hematoma s/p Rt BR lumpectomy    HX TONSILLECTOMY  1963    T&A    MRI GUIDE BREAST BX LT Left     AL ABDOMEN SURGERY PROC UNLISTED  1998    COLECTOMY (1/2 REMOVED; \"WAS IN KNOTS\")    AL BREAST SURGERY PROCEDURE UNLISTED Right 2/2008    RIGHT BREAST LUMPECTOMY       Family History:  Family History   Problem Relation Age of Onset    Heart Disease Mother     High Cholesterol Mother     Hypertension Mother     High Cholesterol Father     Heart Disease Father     Obesity Father     Diabetes Father     Cancer Maternal Grandmother         colon    Breast Cancer Maternal Aunt     Anesth Problems Neg Hx        Social History:  Social History     Tobacco Use    Smoking status: Current Every Day Smoker     Packs/day: 0.50     Years: 45.00     Pack years: 22.50    Smokeless tobacco: Never Used   Vaping Use    Vaping Use: Never used   Substance Use Topics    Alcohol use: Yes     Comment: once a month    Drug use: No       Allergies: Allergies   Allergen Reactions    Aspirin Other (comments)     Blacked out. Talked with patient. Can take other NSAIDS like Ibuprofen, Naprosyn         Review of Systems   Review of Systems   Constitutional: Negative for chills and fever. HENT: Negative for rhinorrhea and sore throat. Eyes: Negative for photophobia and visual disturbance. Respiratory: Negative for chest tightness, shortness of breath and wheezing. Cardiovascular: Negative for chest pain, palpitations and leg swelling. Gastrointestinal: Negative for abdominal pain, diarrhea, nausea and vomiting. Genitourinary: Negative for dysuria and urgency. Musculoskeletal: Negative for back pain and myalgias. Skin: Negative for pallor and rash. Neurological: Positive for weakness, light-headedness and numbness.  Negative for dizziness and headaches. Psychiatric/Behavioral: Positive for confusion. All other systems reviewed and are negative. Physical Exam   Physical Exam  Vitals and nursing note reviewed. Constitutional:       General: She is in acute distress (mild). Appearance: Normal appearance. HENT:      Head: Normocephalic and atraumatic. Eyes:      Extraocular Movements: Extraocular movements intact. Pupils: Pupils are equal, round, and reactive to light. Cardiovascular:      Rate and Rhythm: Regular rhythm. Tachycardia present. Pulses: Normal pulses. Heart sounds: Normal heart sounds. Pulmonary:      Effort: Pulmonary effort is normal.      Breath sounds: Normal breath sounds. Abdominal:      Palpations: Abdomen is soft. Tenderness: There is no abdominal tenderness. There is no guarding or rebound. Musculoskeletal:      Cervical back: Neck supple. No tenderness. Skin:     General: Skin is warm. Capillary Refill: Capillary refill takes less than 2 seconds. Neurological:      General: No focal deficit present. Mental Status: She is alert and oriented to person, place, and time. Cranial Nerves: No cranial nerve deficit. Sensory: No sensory deficit. Motor: No weakness.    Psychiatric:         Mood and Affect: Mood normal.         Behavior: Behavior normal.          Diagnostic Study Results     Labs -     Recent Results (from the past 12 hour(s))   EKG, 12 LEAD, INITIAL    Collection Time: 07/15/21  2:48 PM   Result Value Ref Range    Ventricular Rate 108 BPM    Atrial Rate 108 BPM    P-R Interval 142 ms    QRS Duration 74 ms    Q-T Interval 332 ms    QTC Calculation (Bezet) 444 ms    Calculated P Axis 52 degrees    Calculated R Axis 38 degrees    Calculated T Axis 52 degrees    Diagnosis       Sinus tachycardia  Possible Left atrial enlargement  Nonspecific T wave abnormality  Poor R-wave Progression (consider lead placement or loss of anterior forces)  When compared with ECG of 23-FEB-2021 13:57,  No significant change was found  Confirmed by Raymond Dempsey MD, Juhi Quinones ([id number]) on 7/15/2021 4:32:31 PM     CBC WITH AUTOMATED DIFF    Collection Time: 07/15/21  3:34 PM   Result Value Ref Range    WBC 15.1 (H) 3.6 - 11.0 K/uL    RBC 3.85 3.80 - 5.20 M/uL    HGB 11.9 11.5 - 16.0 g/dL    HCT 36.2 35.0 - 47.0 %    MCV 94.0 80.0 - 99.0 FL    MCH 30.9 26.0 - 34.0 PG    MCHC 32.9 30.0 - 36.5 g/dL    RDW 12.9 11.5 - 14.5 %    PLATELET 424 820 - 417 K/uL    MPV 10.2 8.9 - 12.9 FL    NRBC 0.0 0  WBC    ABSOLUTE NRBC 0.00 0.00 - 0.01 K/uL    NEUTROPHILS 87 (H) 32 - 75 %    LYMPHOCYTES 6 (L) 12 - 49 %    MONOCYTES 6 5 - 13 %    EOSINOPHILS 0 0 - 7 %    BASOPHILS 0 0 - 1 %    IMMATURE GRANULOCYTES 1 (H) 0.0 - 0.5 %    ABS. NEUTROPHILS 13.2 (H) 1.8 - 8.0 K/UL    ABS. LYMPHOCYTES 0.8 0.8 - 3.5 K/UL    ABS. MONOCYTES 0.9 0.0 - 1.0 K/UL    ABS. EOSINOPHILS 0.0 0.0 - 0.4 K/UL    ABS. BASOPHILS 0.0 0.0 - 0.1 K/UL    ABS. IMM.  GRANS. 0.1 (H) 0.00 - 0.04 K/UL    DF AUTOMATED     URINALYSIS W/ RFLX MICROSCOPIC    Collection Time: 07/15/21  4:16 PM   Result Value Ref Range    Color DARK YELLOW      Appearance TURBID (A) CLEAR      Specific gravity 1.022 1.003 - 1.030      pH (UA) 5.0 5.0 - 8.0      Protein 100 (A) NEG mg/dL    Glucose Negative NEG mg/dL    Ketone Negative NEG mg/dL    Blood MODERATE (A) NEG      Urobilinogen 1.0 0.2 - 1.0 EU/dL    Nitrites Negative NEG      Leukocyte Esterase LARGE (A) NEG      WBC  0 - 4 /hpf    RBC 0-5 0 - 5 /hpf    Epithelial cells MODERATE (A) FEW /lpf    Bacteria 2+ (A) NEG /hpf    Mucus 2+ (A) NEG /lpf    Amorphous Crystals 2+ (A) NEG    Granular cast 2-5 (A) NEG /lpf   BILIRUBIN, CONFIRM    Collection Time: 07/15/21  4:16 PM   Result Value Ref Range    Bilirubin UA, confirm Negative NEG     TROPONIN I    Collection Time: 07/15/21  4:30 PM   Result Value Ref Range    Troponin-I, Qt. <0.05 <0.76 ng/mL   METABOLIC PANEL, COMPREHENSIVE    Collection Time: 07/15/21 4:30 PM   Result Value Ref Range    Sodium 132 (L) 136 - 145 mmol/L    Potassium 3.8 3.5 - 5.1 mmol/L    Chloride 102 97 - 108 mmol/L    CO2 23 21 - 32 mmol/L    Anion gap 7 5 - 15 mmol/L    Glucose 99 65 - 100 mg/dL    BUN 23 (H) 6 - 20 MG/DL    Creatinine 1.94 (H) 0.55 - 1.02 MG/DL    BUN/Creatinine ratio 12 12 - 20      GFR est AA 32 (L) >60 ml/min/1.73m2    GFR est non-AA 26 (L) >60 ml/min/1.73m2    Calcium 8.6 8.5 - 10.1 MG/DL    Bilirubin, total 0.5 0.2 - 1.0 MG/DL    ALT (SGPT) 27 12 - 78 U/L    AST (SGOT) 19 15 - 37 U/L    Alk. phosphatase 89 45 - 117 U/L    Protein, total 6.5 6.4 - 8.2 g/dL    Albumin 2.6 (L) 3.5 - 5.0 g/dL    Globulin 3.9 2.0 - 4.0 g/dL    A-G Ratio 0.7 (L) 1.1 - 2.2     LACTIC ACID    Collection Time: 07/15/21  7:21 PM   Result Value Ref Range    Lactic acid 1.3 0.4 - 2.0 MMOL/L       Radiologic Studies -   MRI BRAIN WO CONT   Final Result   1. No acute infarct. 2. Signal abnormality in the vidya which has progressed since 2013. Chronic left   basal ganglia lacunar infarct. Findings consistent with chronic small vessel   ischemic change. XR CHEST PORT   Final Result   No acute findings. CT HEAD WO CONT   Final Result      Study compromised by patient motion. No significant abnormalities. CT Results  (Last 48 hours)               07/15/21 1553  CT HEAD WO CONT Final result    Impression:      Study compromised by patient motion. No significant abnormalities. Narrative:  EXAMINATION:  CT HEAD WO CONT       CLINICAL INFORMATION:  increased confusion   COMPARISON:  CT of 10/6/2018    TECHNIQUE: Routine axial head CT was performed. Sagittal and coronal   reconstructions were generated. CT dose reduction was achieved through use of a standardized protocol tailored   for this examination and automatic exposure control for dose modulation. The study is moderately compromised by patient motion despite repeating multiple   images.  Reformatted images are also suboptimal because of artifact. FINDINGS:   No acute infarct, hemorrhage or mass. VENTRICULAR SYSTEM:  Normal for age. BASAL CISTERNS:  Patent. BRAIN PARENCHYMA:  No significant abnormalities. MIDLINE SHIFT:  None. CALVARIUM/ SKULL BASE: Intact. PARANASAL SINUSES AND MASTOID AIR CELLS: Clear. VISUALIZED ORBITS: No significant abnormalities. SELLA: No enlargement. CXR Results  (Last 48 hours)               07/15/21 1646  XR CHEST PORT Final result    Impression:  No acute findings. Narrative:  EXAM: XR CHEST PORT       INDICATION: eval for pneumonia, fevers       COMPARISON: February 23, 2021       FINDINGS: A portable AP radiograph of the chest was obtained at 1625 hours. The   patient is on a cardiac monitor. The lungs are clear. The cardiac and   mediastinal contours and pulmonary vascularity are normal.  There is a mild   scoliosis. There is evidence of prior cervical spine surgery. Medical Decision Making   I am the first provider for this patient. I reviewed the vital signs, available nursing notes, past medical history, past surgical history, family history and social history. Vital Signs-Reviewed the patient's vital signs. Patient Vitals for the past 12 hrs:   Temp Pulse Resp BP SpO2   07/15/21 2330 97.7 °F (36.5 °C) 95 18 (!) 117/58 96 %   07/15/21 2245  (!) 104 16 (!) 107/57 92 %   07/15/21 2123 99.1 °F (37.3 °C) (!) 110 24 108/60 97 %   07/15/21 2045  (!) 116 20 116/64 95 %   07/15/21 2000 (!) 101.3 °F (38.5 °C) (!) 123 25 101/84 96 %   07/15/21 1915 (!) 103.3 °F (39.6 °C) (!) 128 25 (!) 143/74 95 %   07/15/21 1735  (!) 121 22 130/68 100 %   07/15/21 1447 98.4 °F (36.9 °C) (!) 109 12 (!) 110/54 99 %       ECG Interpretation: Sinus tachycardia at 108 bpm, unchanged when compared with prior EKG    Records Reviewed: Nursing Notes and Old Medical Records    Provider Notes (Medical Decision Making):   Joselito Dias is a 63 y.o. female, with past medical history of lumbar radiculopathy, peripheral neuropathy, and fibromyalgia who comes in complaining of moderate, progressively worsening generalized fatigue, bilateral upper and lower numbness, and decreased concentration ongoing for the past several weeks to month. Patient is hemodynamically stable, afebrile, nontoxic appearing. Neurological exam showed no focal deficits, patient is alerted and oriented x 3 although slow to respond. Concern for polypharmacy given her medications and use of gabapentin 6 times daily. Would like to rule out any acute intracranial pathology although my suspicions are low given normal neurologic exam, CT Head ordered. Patient's reported bilateral numbness in both upper and lower extremities likely secondary to her peripheral neuropathy. Will also rule out ACS although symptoms would be highly atypical, Troponin, EKG, and Chest Xray ordered. Given reported fevers, infectious process such as pneumonia and UTI are also on the differential although patient denies any symptoms suggestive of either diagnosis. UA and CXR ordered. Will obtain basic blood work and imaging as above. Disposition pending results of lab work and imaging. ED Course and Progress Notes:   Initial assessment performed. The patients presenting problems have been discussed, and they are in agreement with the care plan formulated and outlined with them. I have encouraged them to ask questions as they arise throughout their visit. ED Course as of Jul 15 2346   Thu Jul 15, 2021   1803 Patient re-assessed, sleepy on exam and more confused. Now states that she may take a total of 12 gabapentin tablets daily but is unsure. Tachycardic in the 120s, will give additional 1 L of IVF. Tremulous on reassessment. I feel that patient is withdrawing from her gabapentin at this time, states typically takes it around 5:30pm-6:00pm. Will give 1200 mg of Gabapentin here (per her prescription).      [MA] 1830 Attempted to administer gabapentin to patient. Patient given water, missed mouth, unable to swallow. Failed swallow screen. [MA]   1912 Discussed case with hospitalist who will accept patient for admission. [MA]      ED Course User Index  [MA] Loretta Dancer, MD         Diagnosis     Clinical Impression:   1. Urinary tract infection without hematuria, site unspecified    2. Tremors of nervous system    3. Peripheral polyneuropathy    4. Lumbar radiculopathy    5. Dysphagia, unspecified type    6.  Polypharmacy        Disposition:  Admission           Resident Signature: Dr. Dhruv Cheney; PGY-2, Emergency Medicine

## 2021-07-16 LAB
ALBUMIN SERPL-MCNC: 2.1 G/DL (ref 3.5–5)
ALBUMIN/GLOB SERPL: 0.6 {RATIO} (ref 1.1–2.2)
ALP SERPL-CCNC: 75 U/L (ref 45–117)
ALT SERPL-CCNC: 22 U/L (ref 12–78)
ANION GAP SERPL CALC-SCNC: 10 MMOL/L (ref 5–15)
AST SERPL-CCNC: 18 U/L (ref 15–37)
BASOPHILS # BLD: 0 K/UL (ref 0–0.1)
BASOPHILS NFR BLD: 0 % (ref 0–1)
BILIRUB SERPL-MCNC: 0.4 MG/DL (ref 0.2–1)
BUN SERPL-MCNC: 19 MG/DL (ref 6–20)
BUN/CREAT SERPL: 16 (ref 12–20)
C DIFF GDH STL QL: NEGATIVE
C DIFF TOX A+B STL QL IA: NEGATIVE
CALCIUM SERPL-MCNC: 7.8 MG/DL (ref 8.5–10.1)
CHLORIDE SERPL-SCNC: 110 MMOL/L (ref 97–108)
CO2 SERPL-SCNC: 18 MMOL/L (ref 21–32)
CREAT SERPL-MCNC: 1.21 MG/DL (ref 0.55–1.02)
DIFFERENTIAL METHOD BLD: ABNORMAL
EOSINOPHIL # BLD: 0 K/UL (ref 0–0.4)
EOSINOPHIL NFR BLD: 0 % (ref 0–7)
ERYTHROCYTE [DISTWIDTH] IN BLOOD BY AUTOMATED COUNT: 13 % (ref 11.5–14.5)
GLOBULIN SER CALC-MCNC: 3.3 G/DL (ref 2–4)
GLUCOSE SERPL-MCNC: 90 MG/DL (ref 65–100)
HCT VFR BLD AUTO: 29.4 % (ref 35–47)
HCT VFR BLD AUTO: 33.7 % (ref 35–47)
HEMOCCULT STL QL: NEGATIVE
HGB BLD-MCNC: 10.8 G/DL (ref 11.5–16)
HGB BLD-MCNC: 9.8 G/DL (ref 11.5–16)
IMM GRANULOCYTES # BLD AUTO: 0.1 K/UL (ref 0–0.04)
IMM GRANULOCYTES NFR BLD AUTO: 1 % (ref 0–0.5)
INTERPRETATION: NORMAL
LACTATE SERPL-SCNC: 0.5 MMOL/L (ref 0.4–2)
LYMPHOCYTES # BLD: 0.6 K/UL (ref 0.8–3.5)
LYMPHOCYTES NFR BLD: 6 % (ref 12–49)
MCH RBC QN AUTO: 30.7 PG (ref 26–34)
MCHC RBC AUTO-ENTMCNC: 33.3 G/DL (ref 30–36.5)
MCV RBC AUTO: 92.2 FL (ref 80–99)
MONOCYTES # BLD: 0.9 K/UL (ref 0–1)
MONOCYTES NFR BLD: 9 % (ref 5–13)
NEUTS SEG # BLD: 8.4 K/UL (ref 1.8–8)
NEUTS SEG NFR BLD: 84 % (ref 32–75)
NRBC # BLD: 0 K/UL (ref 0–0.01)
NRBC BLD-RTO: 0 PER 100 WBC
PLATELET # BLD AUTO: 147 K/UL (ref 150–400)
PMV BLD AUTO: 10.2 FL (ref 8.9–12.9)
POTASSIUM SERPL-SCNC: 3.6 MMOL/L (ref 3.5–5.1)
PROCALCITONIN SERPL-MCNC: 1.34 NG/ML
PROT SERPL-MCNC: 5.4 G/DL (ref 6.4–8.2)
RBC # BLD AUTO: 3.19 M/UL (ref 3.8–5.2)
RBC MORPH BLD: ABNORMAL
SODIUM SERPL-SCNC: 138 MMOL/L (ref 136–145)
WBC # BLD AUTO: 10 K/UL (ref 3.6–11)

## 2021-07-16 PROCEDURE — 84145 PROCALCITONIN (PCT): CPT

## 2021-07-16 PROCEDURE — 65660000000 HC RM CCU STEPDOWN

## 2021-07-16 PROCEDURE — 85018 HEMOGLOBIN: CPT

## 2021-07-16 PROCEDURE — 74011250636 HC RX REV CODE- 250/636: Performed by: INTERNAL MEDICINE

## 2021-07-16 PROCEDURE — 87324 CLOSTRIDIUM AG IA: CPT

## 2021-07-16 PROCEDURE — 97116 GAIT TRAINING THERAPY: CPT

## 2021-07-16 PROCEDURE — 74011250637 HC RX REV CODE- 250/637: Performed by: INTERNAL MEDICINE

## 2021-07-16 PROCEDURE — 82272 OCCULT BLD FECES 1-3 TESTS: CPT

## 2021-07-16 PROCEDURE — 74011000258 HC RX REV CODE- 258: Performed by: INTERNAL MEDICINE

## 2021-07-16 PROCEDURE — 97161 PT EVAL LOW COMPLEX 20 MIN: CPT

## 2021-07-16 PROCEDURE — 36415 COLL VENOUS BLD VENIPUNCTURE: CPT

## 2021-07-16 PROCEDURE — 74011250636 HC RX REV CODE- 250/636: Performed by: NURSE PRACTITIONER

## 2021-07-16 PROCEDURE — 87506 IADNA-DNA/RNA PROBE TQ 6-11: CPT

## 2021-07-16 PROCEDURE — 83605 ASSAY OF LACTIC ACID: CPT

## 2021-07-16 PROCEDURE — 80053 COMPREHEN METABOLIC PANEL: CPT

## 2021-07-16 PROCEDURE — 85025 COMPLETE CBC W/AUTO DIFF WBC: CPT

## 2021-07-16 RX ADMIN — ACETAMINOPHEN 650 MG: 325 TABLET ORAL at 11:30

## 2021-07-16 RX ADMIN — GABAPENTIN 600 MG: 300 CAPSULE ORAL at 18:00

## 2021-07-16 RX ADMIN — SODIUM CHLORIDE 100 ML/HR: 9 INJECTION, SOLUTION INTRAVENOUS at 20:58

## 2021-07-16 RX ADMIN — Medication 10 ML: at 14:00

## 2021-07-16 RX ADMIN — ENOXAPARIN SODIUM 30 MG: 30 INJECTION, SOLUTION INTRAVENOUS; SUBCUTANEOUS at 08:18

## 2021-07-16 RX ADMIN — Medication 10 ML: at 21:00

## 2021-07-16 RX ADMIN — DULOXETINE 60 MG: 30 CAPSULE, DELAYED RELEASE ORAL at 18:00

## 2021-07-16 RX ADMIN — ACETAMINOPHEN 650 MG: 325 TABLET ORAL at 21:15

## 2021-07-16 RX ADMIN — GABAPENTIN 600 MG: 300 CAPSULE ORAL at 08:18

## 2021-07-16 RX ADMIN — ACETAMINOPHEN 650 MG: 325 TABLET ORAL at 01:22

## 2021-07-16 RX ADMIN — CEFTRIAXONE 1 G: 1 INJECTION, POWDER, FOR SOLUTION INTRAMUSCULAR; INTRAVENOUS at 20:11

## 2021-07-16 RX ADMIN — ATORVASTATIN CALCIUM 20 MG: 20 TABLET, FILM COATED ORAL at 08:19

## 2021-07-16 RX ADMIN — SODIUM CHLORIDE 100 ML/HR: 9 INJECTION, SOLUTION INTRAVENOUS at 10:08

## 2021-07-16 RX ADMIN — DULOXETINE 60 MG: 30 CAPSULE, DELAYED RELEASE ORAL at 11:30

## 2021-07-16 RX ADMIN — PANTOPRAZOLE SODIUM 40 MG: 40 TABLET, DELAYED RELEASE ORAL at 08:19

## 2021-07-16 RX ADMIN — SODIUM CHLORIDE 1000 ML: 9 INJECTION, SOLUTION INTRAVENOUS at 02:57

## 2021-07-16 NOTE — PROGRESS NOTES
Takes gabapentin 1200 mg tid at home  Will adjust to 600 mg twice daily per renal dosing protocol for crcl < 59 ml/min    YVETTE Becerril

## 2021-07-16 NOTE — ED NOTES
Bedside and Verbal shift change report given to [address] [place] [address] (oncoming nurse) by Anuj Lucas (offgoing nurse). Report included the following information SBAR, ED Summary, MAR and Recent Results.

## 2021-07-16 NOTE — PROGRESS NOTES
Hospitalist Progress Note    NAME: Cheikh Camarillo   :  1955   MRN:  [id number]       Assessment / Plan:  Sepsis due to urinary tract infection  UA positive for UTI C  Urine culture sent. FU check blood cultures. on ceftriaxone. Patient also having diarrhea over the last couple of days and C. difficile sent we will follow up report. Bilateral lower extremity numbness  Unsteady gait  History of degenerative disease of the spine  MRI of the brain  She had a recent MRI of the spine which shows evidence of severe stenosis  She reports symptoms have been going on for at least few months. Ortho on board appreciated help   Acute kidney injury likely prerenal  Improving with IVF hydration   Follow up CMP   GERD  Neuropathy  Dyslipidemia  Continue PPI, Cymbalta, gabapentin and Lipitor  Code Status: Full code  Surrogate Decision Maker:  Zigmund Police  DVT Prophylaxis: Lovenox  GI Prophylaxis: not indicated  Baseline: From home, with gradual decline in mobility since last few months due to weakness and numbness     Subjective:     Chief Complaint / Reason for Physician Visit  \"Patient did not have any complaints for me no chest pain or shortness of breath. No fever or chills. \". Discussed with RN events overnight. Review of Systems:  Symptom Y/N Comments  Symptom Y/N Comments   Fever/Chills n   Chest Pain n    Poor Appetite    Edema     Cough    Abdominal Pain n    Sputum    Joint Pain     SOB/MOORE n   Pruritis/Rash     Nausea/vomit    Tolerating PT/OT     Diarrhea    Tolerating Diet     Constipation    Other       Could NOT obtain due to:      Objective:     VITALS:   Last 24hrs VS reviewed since prior progress note.  Most recent are:  Patient Vitals for the past 24 hrs:   Temp Pulse Resp BP SpO2   21 0715 97.9 °F (36.6 °C) 98 18 114/63 95 %   21 0630 97.7 °F (36.5 °C) 95 16 (!) 114/56 96 %   21 0530 98.3 °F (36.8 °C) 96 15 (!) 109/56 94 %   21 0403 98.4 °F (36.9 °C) (!) 104 18 (!) 101/58 94 %   07/16/21 0330  (!) 114 18 (!) 107/54 93 %   07/16/21 0300 99.8 °F (37.7 °C) (!) 115 20 109/60 96 %   07/16/21 0230 99.3 °F (37.4 °C) (!) 122 20 123/69 94 %   07/16/21 0145  (!) 126 22 (!) 148/76 98 %   07/16/21 0100 (!) 102.3 °F (39.1 °C) (!) 116 24 135/66 98 %   07/16/21 0015  (!) 106 18 113/65 98 %   07/15/21 2330 97.7 °F (36.5 °C) 95 18 (!) 117/58 96 %   07/15/21 2245  (!) 104 16 (!) 107/57 92 %   07/15/21 2123 99.1 °F (37.3 °C) (!) 110 24 108/60 97 %   07/15/21 2045  (!) 116 20 116/64 95 %   07/15/21 2000 (!) 101.3 °F (38.5 °C) (!) 123 25 101/84 96 %   07/15/21 1915 (!) 103.3 °F (39.6 °C) (!) 128 25 (!) 143/74 95 %   07/15/21 1735  (!) 121 22 130/68 100 %   07/15/21 1447 98.4 °F (36.9 °C) (!) 109 12 (!) 110/54 99 %       Intake/Output Summary (Last 24 hours) at 7/16/2021 0800  Last data filed at 7/16/2021 0327  Gross per 24 hour   Intake 3050 ml   Output    Net 3050 ml        PHYSICAL EXAM:  General: WD, WN. Alert, cooperative, no acute distress    EENT:  EOMI. Anicteric sclerae. MMM  Resp:  CTA bilaterally, no wheezing or rales. No accessory muscle use  CV:  Regular  rhythm,  No edema  GI:  Soft, Non distended, Non tender.  +Bowel sounds  Neurologic:  Alert and oriented X 3, normal speech,   Psych:   Good insight. Not anxious nor agitated  Skin:  No rashes. No jaundice    Reviewed most current lab test results and cultures  YES  Reviewed most current radiology test results   YES  Review and summation of old records today    NO  Reviewed patient's current orders and MAR    YES  PMH/SH reviewed - no change compared to H&P  ________________________________________________________________________  Care Plan discussed with:    Comments   Patient y    Family  y  in room    RN y    Care Manager     Consultant                       y Multidiciplinary team rounds were held today with , nursing, pharmacist and clinical coordinator.   Patient's plan of care was discussed; medications were reviewed and discharge planning was addressed. ________________________________________________________________________  Total NON critical care TIME:  25   Minutes    Total CRITICAL CARE TIME Spent:   Minutes non procedure based      Comments   >50% of visit spent in counseling and coordination of care y    ________________________________________________________________________  Tommy Alicia MD     Procedures: see electronic medical records for all procedures/Xrays and details which were not copied into this note but were reviewed prior to creation of Plan. LABS:  I reviewed today's most current labs and imaging studies. Pertinent labs include:  Recent Labs     07/16/21  0302 07/15/21  1534   WBC 10.0 15.1*   HGB 9.8* 11.9   HCT 29.4* 36.2   * 173     Recent Labs     07/16/21  0302 07/15/21  1630    132*   K 3.6 3.8   * 102   CO2 18* 23   GLU 90 99   BUN 19 23*   CREA 1.21* 1.94*   CA 7.8* 8.6   ALB 2.1* 2.6*   TBILI 0.4 0.5   ALT 22 27       Signed:  Tommy Alicia MD

## 2021-07-16 NOTE — PROGRESS NOTES
Transition of Care Plan:    RUR: 14%   Disposition: TBD - likely home with [facility]  Follow up appointments: PCP  DME needed: Pt has a cane, wc, and RW  Transportation at Discharge: Pt's   [address] or means to access home: Pt's  has access       IM Medicare letter: To be given prior to d/c  Caregiver Contact: Spouse, Edwardo Diaz ([phone])  Discharge Caregiver contacted prior to discharge? CM will contact cg prior to d/c    Reason for Admission:  Acute metabolic encephalopathy, UTI                     RUR Score:          14%           Plan for utilizing home health:      PT/OT are recommending    PCP: First and Last name: Dr. Serafin Forbes     Name of Practice: [facility]     Are you a current patient: Yes/No: Yes   Approximate date of last visit: 6/28/21   Can you participate in a virtual visit with your PCP: Yes                    Current Advanced Directive/Advance Care Plan: Full Code      Healthcare Decision Maker: Bailey Samano ([phone])                  Transition of Care Plan:       Home with [facility]       CM contacted pt via bedside telephone to complete initial assessment due to contact isolation precautions. CM introduced role, verified demographics, and discussed discharge planning. Pt is independent at baseline. Pt has a RW, WC, and cane. Pt reports a hx of HH and SNF however does not remember the names of them. Pt's  will transport her home at d/c. CM will continue to follow for discharge planning while patient is admitted on current unit. Please contact this CM with questions or issues related to discharge. Care Management Interventions  PCP Verified by CM: Yes (Dr. Serafin Forbes)  Last Visit to PCP: 06/28/21  Mode of Transport at Discharge:  Other (see comment) (Pt's )  Transition of Care Consult (CM Consult): Discharge Planning  Discharge Durable Medical Equipment: No (Pt has a cane, rw, and wc)  Physical Therapy Consult: Yes  Occupational Therapy Consult: Yes  Speech Therapy Consult: No  Current Support Network: Lives with Spouse (Pt lives with her  in a 2 story home with 4 MILLIC[address])  Confirm Follow Up Transport: Family  Discharge Location  Discharge Placement: Home with home health  --------------------------------------  Justin Rascon (ACP) Conversation      Date of Conversation: 7/16/2021  Conducted with: Patient with Decision Making Capacity    Healthcare Decision Maker:     Primary Decision Maker: [id number] [phone] - [phone]  Today we documented Decision Maker(s) consistent with Legal Next of Kin hierarchy.     Content/Action Overview:   DECLINED ACP conversation - will revisit periodically   Reviewed DNR/DNI and patient elects Full Code (Attempt Resuscitation)    Length of Voluntary ACP Conversation in minutes:  <16 minutes (Non-Billable)    EVA Villarreal  Care Manager [facility]  [phone]

## 2021-07-16 NOTE — PROGRESS NOTES
Received message from patient's nurse stating / informing that: Pt has had two very loose bowel movements so far, would you like to send any sample other than the occult? Discussion / orders:    c-diff panel ordered         Please note that this note was dictated using Dragon computer voice recognition software. Quite often unanticipated grammatical, syntax, homophones, and other interpretive errors are inadvertently transcribed by the computer software. Please disregard these errors. Please excuse any errors that have escaped final proofreading.

## 2021-07-16 NOTE — PROGRESS NOTES
Received message from patient's nurse stating / informing that  Prior to giving Tylenol for temp of 102.3, Pt heart rate got up to about 135. Pt heart rate now average of 122 and last temp 99.3. Discussion / orders:    Reviewed patient record. Admitted for sepsis secondary to urinary tract infection. Currently on Rocephin IV  Had received 2 L normal saline bolus in the emergency department last evening  Paired blood cultures pending  Urine culture pending    Patient Vitals for the past 4 hrs:   Temp Pulse Resp BP SpO2   07/16/21 0230 99.3 °F (37.4 °C) (!) 122 20 123/69 94 %   07/16/21 0145  (!) 126 22 (!) 148/76 98 %   07/16/21 0100 (!) 102.3 °F (39.1 °C) (!) 116 24 135/66 98 %   07/16/21 0015  (!) 106 18 113/65 98 %   07/15/21 2330 97.7 °F (36.5 °C) 95 18 (!) 117/58 96 %   07/15/21 2245  (!) 104 16 (!) 107/57 92 %         · 1L NS bolus x 1  · Stat lactic acid level           Please note that this note was dictated using Dragon computer voice recognition software. Quite often unanticipated grammatical, syntax, homophones, and other interpretive errors are inadvertently transcribed by the computer software. Please disregard these errors. Please excuse any errors that have escaped final proofreading.

## 2021-07-16 NOTE — PROGRESS NOTES
Problem: Mobility Impaired (Adult and Pediatric)  Goal: *Acute Goals and Plan of Care (Insert Text)  Description: FUNCTIONAL STATUS PRIOR TO ADMISSION: Pt is mod I with rollator at baseline; indep with ADLs    HOME SUPPORT PRIOR TO ADMISSION: The patient lived with  but did not require assist.    Physical Therapy Goals  Initiated 7/16/2021  1. Patient will move from supine to sit and sit to supine , scoot up and down, and roll side to side in bed with modified independence within 7 day(s). 2.  Patient will transfer from bed to chair and chair to bed with modified independence using the least restrictive device within 7 day(s). 3.  Patient will perform sit to stand with modified independence within 7 day(s). 4.  Patient will ambulate with modified independence for 150 feet with the least restrictive device within 7 day(s). 5.  Patient will ascend/descend 4 stairs with handrail(s) with supervision/set-up within 7 day(s). Outcome: Not Met   PHYSICAL THERAPY EVALUATION  Patient: Lio Brooke (4 y.o. female)  Date: 7/16/2021  Primary Diagnosis: Acute metabolic encephalopathy [V56.14]  UTI (urinary tract infection) [N39.0]        Precautions: fall       ASSESSMENT  Based on the objective data described below, the patient presents with limitations in gait, functional mobility and activity tolerance minimally below her baseline. She is S for bed mobility, CGA for transfers, CGA for amb with RW. Note slight decreased coordination of LLE in movement and gait. Given pt's lumbar stenosis and per ortho MD note, may need to have decompression/fusion surgery in future, pt may benefit from HHPT to continue gait training and strengthening for optimal safety in mobility and strength for that poss surgery. Current Level of Function Impacting Discharge (mobility/balance): Overall CGA to S    Functional Outcome Measure:   The patient scored 60/100 on the barthel outcome measure which is indicative of 40% functional impairment. Other factors to consider for discharge: lumbar stenosis with poss need for future surgery     Patient will benefit from skilled therapy intervention to address the above noted impairments. PLAN :  Recommendations and Planned Interventions: bed mobility training, transfer training, gait training, therapeutic exercises, patient and family training/education, and therapeutic activities      Frequency/Duration: Patient will be followed by physical therapy:  4 times a week to address goals. Recommendation for discharge: (in order for the patient to meet his/her long term goals)  Physical therapy at least 2 days/week in the home     This discharge recommendation:  Has not yet been discussed the attending provider and/or case management    IF patient discharges home will need the following DME: patient owns DME required for discharge         SUBJECTIVE:   Patient stated I can't feel my feet well.     OBJECTIVE DATA SUMMARY:   HISTORY:    Past Medical History:   Diagnosis Date    Arthritis     RHEUMATOID AND OSTEOARTHRITIS    Breast cancer ([facility] [address] 2008    right    C. difficile diarrhea 2004    as of 10/23/14 pt denies any diarrhea or sx    Cancer ([facility] [address] 1982    uterine CA, hysterectomy, no chemo / radiation    Cancer ([facility] [address] 2008    Rt BR; lumpectomy, radiation with chemo pill x6 yrs    Fibromyalgia     GERD (gastroesophageal reflux disease)     Hypercholesteremia     Neuropathy     Osteoarthritis     Other ill-defined conditions(799.89)     GRANULOMA ANEULARE, SKIN DISORDER    Psychiatric disorder     ANXIETY    Rheumatoid arthritis ([facility] [address]     S/P radiation therapy 2008    right breast    Seasonal allergies      Past Surgical History:   Procedure Laterality Date    COLONOSCOPY N/A 5/25/2018    COLONOSCOPY performed by Gil Espinoza MD at [facility] ENDOSCOPY    COLONOSCOPY N/A 7/9/2021    COLONOSCOPY performed by Merritt Perez MD at [facility] ENDOSCOPY    HX APPENDECTOMY      HX CARPAL TUNNEL RELEASE Bilateral     HX CERVICAL DISKECTOMY  2/3/14    C4-5     HX COLONOSCOPY      HX GI      ENDOSCOPY (DILATATION/ESOPH)    HX HYSTERECTOMY  1982    partial    HX KNEE ARTHROSCOPY Left     x2    HX LUMBAR DISKECTOMY  06/2018    HX MOHS PROCEDURES Right     HX ORTHOPAEDIC  2005, 2011    CERVICAL NECKx 3    HX ORTHOPAEDIC Bilateral 1988    carpal tunnel repair, and bilater knee arthroscopy    HX ORTHOPAEDIC Left     shoulder arthroscopy    HX OTHER SURGICAL  3/2008    hematoma s/p Rt BR lumpectomy    HX TONSILLECTOMY  1963    T&A    MRI GUIDE BREAST BX LT Left     OR ABDOMEN SURGERY PROC UNLISTED  1998    COLECTOMY (1/2 REMOVED; \"WAS IN KNOTS\")    OR BREAST SURGERY PROCEDURE UNLISTED Right 2/2008    RIGHT BREAST LUMPECTOMY       Personal factors and/or comorbidities impacting plan of care: lumbar stenosis, RA, OA    Home Situation  Home Environment: Private residence  # Steps to Enter: (P) 4  Rails to Enter: (P) Yes  Hand Rails : (P) Bilateral  One/Two Story Residence: Two story  Living Alone: No  Support Systems: Family member(s)  Patient Expects to be Discharged to[de-identified] House  Current DME Used/Available at Home: (P) Walker, rollator, Walker, rolling, Cane, straight, Shower chair, Commode, bedside, Wheelchair, Grab bars  Tub or Shower Type: (P) Shower    EXAMINATION/PRESENTATION/DECISION MAKING:   Critical Behavior:  Neurologic State: (P) Alert  Orientation Level: (P) Oriented X4  Cognition: (P) Follows commands     Hearing:   Auditory  Auditory Impairment: None    Range Of Motion:  AROM: Within functional limits                       Strength:    Strength: Generally decreased, functional                    Tone & Sensation:                  Sensation: Impaired (numbness in BLE below knees)               Coordination:  Coordination: Generally decreased, functional (decr more on LLE)       Functional Mobility:  Bed Mobility:  Rolling: Independent  Supine to Sit: Supervision  Sit to Supine: Supervision  Scooting: Supervision  Transfers:  Sit to Stand: Contact guard assistance  Stand to Sit: Contact guard assistance                       Balance:   Sitting: Intact  Standing: Impaired  Standing - Static: Fair  Standing - Dynamic : Fair  Ambulation/Gait Training:  Distance (ft): 25 Feet (ft)  Assistive Device: Gait belt;Walker, rolling  Ambulation - Level of Assistance: Contact guard assistance        Gait Abnormalities: Decreased step clearance        Base of Support: Widened     Speed/Lesvia: Slow;Pace decreased (<100 feet/min)  Step Length: Right shortened;Left shortened               Functional Measure:  Barthel Index:    Bathin  Bladder: 5  Bowels: 10  Groomin  Dressin  Feeding: 10  Mobility: 10  Stairs: 0  Toilet Use: 5  Transfer (Bed to Chair and Back): 10  Total: 60/100       The Barthel ADL Index: Guidelines  1. The index should be used as a record of what a patient does, not as a record of what a patient could do. 2. The main aim is to establish degree of independence from any help, physical or verbal, however minor and for whatever reason. 3. The need for supervision renders the patient not independent. 4. A patient's performance should be established using the best available evidence. Asking the patient, friends/relatives and nurses are the usual sources, but direct observation and common sense are also important. However direct testing is not needed. 5. Usually the patient's performance over the preceding 24-48 hours is important, but occasionally longer periods will be relevant. 6. Middle categories imply that the patient supplies over 50 per cent of the effort. 7. Use of aids to be independent is allowed. Rashmi Cabello., Barthel, D.W. (0012). Functional evaluation: the Barthel Index. [facility] (14)2. RICHARD Broussard, Bailey Phillips., Hang Allison., Quita Steve, 7 Adonis Katelynn ().  Measuring the change indisability after inpatient rehabilitation; comparison of the responsiveness of the Barthel Index and Functional Bond Measure. Journal of Neurology, Neurosurgery, and Psychiatry, 66(4), 897-013. JUSTIN Watts.MARGIE, LEONARDO Lloyd, & Ayaz Hunter M.A. (2004.) Assessment of post-stroke quality of life in cost-effectiveness studies: The usefulness of the Barthel Index and the EuroQoL-5D. Quality of Life Research, 15, 365-93           Physical Therapy Evaluation Charge Determination   History Examination Presentation Decision-Making   HIGH Complexity :3+ comorbidities / personal factors will impact the outcome/ POC  HIGH Complexity : 4+ Standardized tests and measures addressing body structure, function, activity limitation and / or participation in recreation  LOW Complexity : Stable, uncomplicated  LOW Complexity : FOTO score of       Based on the above components, the patient evaluation is determined to be of the following complexity level: LOW     Pain Ratin/10 low back, placed in position of comfort    Activity Tolerance:   Good, vss    After treatment patient left in no apparent distress:   Sitting in chair and Call bell within reach    COMMUNICATION/EDUCATION:   The patients plan of care was discussed with: Registered nurse. Fall prevention education was provided and the patient/caregiver indicated understanding., Patient/family have participated as able in goal setting and plan of care. , and Patient/family agree to work toward stated goals and plan of care.     Thank you for this referral.  Armin Gillis, PT   Time Calculation: 25 mins

## 2021-07-16 NOTE — PROGRESS NOTES
End of Shift Note    Bedside shift change report given to Ria Hodgkins (oncoming nurse) by Roberth Davis (offgoing nurse).   Report included the following information SBAR, Kardex, ED Summary, Intake/Output, MAR and Recent Results    Shift worked:  7-7pm     Shift summary and any significant changes:     Several watery stools during the shift     Concerns for physician to address:      Zone phone for oncoming shift:

## 2021-07-16 NOTE — H&P
Hospitalist Admission Note    NAME: Xiomara Monge   :  1955   MRN:  [id number]     Date/Time:  7/15/2021 9:44 PM    Patient PCP: Shiv Bundy NP  ________________________________________________________________________    My assessment of this patient's clinical condition and my plan of care is as follows. Assessment / Plan:  Sepsis due to urinary tract infection  -She has fever and also leukocytosis. UA is abnormal with large leukocyte esterase, 2+ bacteria and 50 to 100 WBC  -Lactic acid is normal at 1.3  -Urine culture sent. Will check blood cultures. Start empiric ceftriaxone. Bilateral lower extremity numbness  Unsteady gait  History of degenerative disease of the spine  -Check MRI of the brain  -She had a recent MRI of the spine which shows evidence of severe stenosis  -She reports symptoms have been going on for at least few months. Will consult orthopedics. Patient was seen by her primary orthopedist Dr. Noreen Hendricks to the hospital    Acute kidney injury likely prerenal  -Baseline creatinine is normal and currently creatinine is 1.94. Start IV fluids. Treat urinary tract infection. Repeat BMP in a.m. GERD  Neuropathy  Dyslipidemia  -Continue PPI, Cymbalta, gabapentin and Lipitor        Code Status: Full code  Surrogate Decision Maker:  Chris Sarkar    DVT Prophylaxis: Lovenox  GI Prophylaxis: not indicated    Baseline: From home, with gradual decline in mobility since last few months due to weakness and numbness        Subjective:   CHIEF COMPLAINT: Generalized weakness, unsteady gait    HISTORY OF PRESENT ILLNESS:     Xiomara Monge is a 63 y.o. female who presents with past medical history of GERD, neuropathy, dyslipidemia is coming the hospital chief complaints of weakness of both legs along with numbness and also unsteady gait. She reports weakness is involving both proximal and distal muscles. She also reports occasional numbness as well.   She reports symptoms have been going on for at least a few months. She reports chronic tingling and numbness in both legs secondary to degenerative disease of the spine and has seen a neurologist and also orthopedics in the past.  She had a orthopedics appointment today and was advised to go to the emergency department for further evaluation and given her progressive symptoms. On arrival to ED, she was noted to have fever. WBC was elevated at 15.1. BMP showed a creatinine of 1.94. LFTs are normal.  Lactic acid is 1.3. Troponin is 0.05. Chest x-ray shows no acute process. CT head normal.    We were asked to admit for work up and evaluation of the above problems.      Past Medical History:   Diagnosis Date    Arthritis     RHEUMATOID AND OSTEOARTHRITIS    Breast cancer ([facility] [address] 2008    right    C. difficile diarrhea 2004    as of 10/23/14 pt denies any diarrhea or sx    Cancer ([facility] [address] 1982    uterine CA, hysterectomy, no chemo / radiation    Cancer ([facility] [address] 2008    Rt BR; lumpectomy, radiation with chemo pill x6 yrs    Fibromyalgia     GERD (gastroesophageal reflux disease)     Hypercholesteremia     Neuropathy     Osteoarthritis     Other ill-defined conditions(799.89)     GRANULOMA ANEULARE, SKIN DISORDER    Psychiatric disorder     ANXIETY    Rheumatoid arthritis ([facility] [address]     S/P radiation therapy 2008    right breast    Seasonal allergies         Past Surgical History:   Procedure Laterality Date    COLONOSCOPY N/A 5/25/2018    COLONOSCOPY performed by Gab Estrada MD at [facility]COPY    COLONOSCOPY N/A 7/9/2021    COLONOSCOPY performed by Castro Griffith MD at [facility] ENDOSCOPY    HX APPENDECTOMY      HX CARPAL TUNNEL RELEASE Bilateral     HX CERVICAL DISKECTOMY  2/3/14    C4-5     HX COLONOSCOPY      HX GI      ENDOSCOPY (DILATATION/ESOPH)    HX HYSTERECTOMY  1982    partial    HX KNEE ARTHROSCOPY Left     x2    HX LUMBAR DISKECTOMY  06/2018    HX MOHS PROCEDURES Right     HX ORTHOPAEDIC 2005, 2011    CERVICAL NECKx 3    HX ORTHOPAEDIC Bilateral 1988    carpal tunnel repair, and bilater knee arthroscopy    HX ORTHOPAEDIC Left     shoulder arthroscopy    HX OTHER SURGICAL  3/2008    hematoma s/p Rt BR lumpectomy    HX TONSILLECTOMY  1963    T&A    MRI GUIDE BREAST BX LT Left     NE ABDOMEN SURGERY PROC UNLISTED  1998    COLECTOMY (1/2 REMOVED; \"WAS IN KNOTS\")    NE BREAST SURGERY PROCEDURE UNLISTED Right 2/2008    RIGHT BREAST LUMPECTOMY       Social History     Tobacco Use    Smoking status: Current Every Day Smoker     Packs/day: 0.50     Years: 45.00     Pack years: 22.50    Smokeless tobacco: Never Used   Substance Use Topics    Alcohol use: Yes     Comment: once a month        Family History   Problem Relation Age of Onset    Heart Disease Mother     High Cholesterol Mother     Hypertension Mother     High Cholesterol Father     Heart Disease Father     Obesity Father     Diabetes Father     Cancer Maternal Grandmother         colon    Breast Cancer Maternal Aunt     Anesth Problems Neg Hx      Allergies   Allergen Reactions    Aspirin Other (comments)     Blacked out. Talked with patient. Can take other NSAIDS like Ibuprofen, Naprosyn        Prior to Admission medications    Medication Sig Start Date End Date Taking? Authorizing Provider   ergocalciferol (Vitamin D2) 1,250 mcg (50,000 unit) capsule Take 5,000 Units by mouth. Yes Other, Phys, MD   TURMERIC PO Take 500 mg by mouth daily. Patient not taking: Reported on 7/9/2021    Provider, Historical   cholecalciferol (Vitamin D3) (5000 Units/125 mcg) tab tablet Take 5,000 Units by mouth daily. Provider, Historical   Alpha Lipoic Acid 600 mg cap Take 600 mg by mouth daily. Patient not taking: Reported on 7/9/2021    Provider, Historical   doxycycline (MONODOX) 100 mg capsule Take 100 mg by mouth two (2) times a day. Skin disorder    Provider, Historical   traZODone (DESYREL) 50 mg tablet Take 100 mg by mouth nightly. Provider, Historical   gabapentin (NEURONTIN) 600 mg tablet Take 1,200 mg by mouth three (3) times daily. Provider, Historical   acetaminophen (TYLENOL EXTRA STRENGTH) 500 mg tablet Take 1,000 mg by mouth every six (6) hours as needed for Pain. Provider, Historical   esomeprazole (NEXIUM) 20 mg capsule Take 20 mg by mouth daily. Indications: Heartburn    Provider, Historical   diclofenac (VOLTAREN) 1 % gel Apply  to affected area as needed. Provider, Historical   DULoxetine (CYMBALTA) 60 mg capsule TAKE 2 CAPSULES DAILY  Patient taking differently: Take 60 mg by mouth two (2) times a day. 1/30/17   Beverley Lara MD   diphenhydrAMINE (BENADRYL) 50 mg tablet Take 100 mg by mouth two (2) times a day. Indications: ALLERGIC REACTIONS    Provider, Historical   SIMVASTATIN (ZOCOR PO) Take 40 mg by mouth nightly. Other, MD Bertha       REVIEW OF SYSTEMS:     I am not able to complete the review of systems because:    The patient is intubated and sedated    The patient has altered mental status due to his acute medical problems    The patient has baseline aphasia from prior stroke(s)    The patient has baseline dementia and is not reliable historian    The patient is in acute medical distress and unable to provide information           Total of 12 systems reviewed as follows:       POSITIVE= underlined text  Negative = text not underlined  General:  fever, chills, sweats, generalized weakness, weight loss/gain,      loss of appetite   Eyes:    blurred vision, eye pain, loss of vision, double vision  ENT:    rhinorrhea, pharyngitis   Respiratory:   cough, sputum production, SOB, MOORE, wheezing, pleuritic pain   Cardiology:   chest pain, palpitations, orthopnea, PND, edema, syncope   Gastrointestinal:  abdominal pain , N/V, diarrhea, dysphagia, constipation, bleeding   Genitourinary:  frequency, urgency, dysuria, hematuria, incontinence   Muskuloskeletal :  arthralgia, myalgia, back pain  Hematology:  easy bruising, nose or gum bleeding, lymphadenopathy   Dermatological: rash, ulceration, pruritis, color change / jaundice  Endocrine:   hot flashes or polydipsia   Neurological:  headache, dizziness, confusion, focal weakness, paresthesia,     Speech difficulties, memory loss, gait difficulty  Psychological: Feelings of anxiety, depression, agitation    Objective:   VITALS:    Visit Vitals  /60   Pulse (!) 110   Temp 99.1 °F (37.3 °C)   Resp 24   Ht 5' 2\" (1.575 m)   Wt 65.8 kg (145 lb)   SpO2 97%   BMI 26.52 kg/m²       PHYSICAL EXAM:    General:    Alert, cooperative, no distress, appears stated age. HEENT: Atraumatic, anicteric sclerae, pink conjunctivae     No oral ulcers, mucosa moist, throat clear, dentition fair  Neck:  Supple, symmetrical,  thyroid: non tender  Lungs:   Clear to auscultation bilaterally. No Wheezing or Rhonchi. No rales. Chest wall:  No tenderness  No Accessory muscle use. Heart:   Regular  rhythm,  No  murmur   No edema  Abdomen:   Soft, non-tender. Not distended. Bowel sounds normal  Extremities: No cyanosis. No clubbing,      Skin turgor normal, Capillary refill normal, Radial dial pulse 2+  Skin:     Not pale. Not Jaundiced  No rashes   Psych:  Good insight. Not depressed. Not anxious or agitated.   Neurologic: Alert, awake, oriented x3, able to lift all 4 extremities against gravity, sensation decreased in both legs, cranial nerves intact.     _______________________________________________________________________  Care Plan discussed with:    Comments   Patient y    Family      RN y    Care Manager                    Consultant:      _______________________________________________________________________  Expected  Disposition:   Home with Family y   HH/PT/OT/RN    SNF/LTC    PAUL    ________________________________________________________________________  TOTAL TIME: 61  Minutes    Critical Care Provided     Minutes non procedure based      Comments    y Reviewed previous records   >50% of visit spent in counseling and coordination of care y Discussion with patient and/or family and questions answered       ________________________________________________________________________  Signed: Jerald Min MD    Procedures: see electronic medical records for all procedures/Xrays and details which were not copied into this note but were reviewed prior to creation of Plan. LAB DATA REVIEWED:    Recent Results (from the past 24 hour(s))   EKG, 12 LEAD, INITIAL    Collection Time: 07/15/21  2:48 PM   Result Value Ref Range    Ventricular Rate 108 BPM    Atrial Rate 108 BPM    P-R Interval 142 ms    QRS Duration 74 ms    Q-T Interval 332 ms    QTC Calculation (Bezet) 444 ms    Calculated P Axis 52 degrees    Calculated R Axis 38 degrees    Calculated T Axis 52 degrees    Diagnosis       Sinus tachycardia  Possible Left atrial enlargement  Nonspecific T wave abnormality  Poor R-wave Progression (consider lead placement or loss of anterior forces)  When compared with ECG of 23-FEB-2021 13:57,  No significant change was found  Confirmed by Dora Nunez MD, [facility] ([id number]) on 7/15/2021 4:32:31 PM     CBC WITH AUTOMATED DIFF    Collection Time: 07/15/21  3:34 PM   Result Value Ref Range    WBC 15.1 (H) 3.6 - 11.0 K/uL    RBC 3.85 3.80 - 5.20 M/uL    HGB 11.9 11.5 - 16.0 g/dL    HCT 36.2 35.0 - 47.0 %    MCV 94.0 80.0 - 99.0 FL    MCH 30.9 26.0 - 34.0 PG    MCHC 32.9 30.0 - 36.5 g/dL    RDW 12.9 11.5 - 14.5 %    PLATELET 327 904 - 473 K/uL    MPV 10.2 8.9 - 12.9 FL    NRBC 0.0 0  WBC    ABSOLUTE NRBC 0.00 0.00 - 0.01 K/uL    NEUTROPHILS 87 (H) 32 - 75 %    LYMPHOCYTES 6 (L) 12 - 49 %    MONOCYTES 6 5 - 13 %    EOSINOPHILS 0 0 - 7 %    BASOPHILS 0 0 - 1 %    IMMATURE GRANULOCYTES 1 (H) 0.0 - 0.5 %    ABS. NEUTROPHILS 13.2 (H) 1.8 - 8.0 K/UL    ABS. LYMPHOCYTES 0.8 0.8 - 3.5 K/UL    ABS. MONOCYTES 0.9 0.0 - 1.0 K/UL    ABS. EOSINOPHILS 0.0 0.0 - 0.4 K/UL    ABS.  BASOPHILS 0.0 0.0 - 0.1 K/UL ABS. IMM. GRANS. 0.1 (H) 0.00 - 0.04 K/UL    DF AUTOMATED     URINALYSIS W/ RFLX MICROSCOPIC    Collection Time: 07/15/21  4:16 PM   Result Value Ref Range    Color DARK YELLOW      Appearance TURBID (A) CLEAR      Specific gravity 1.022 1.003 - 1.030      pH (UA) 5.0 5.0 - 8.0      Protein 100 (A) NEG mg/dL    Glucose Negative NEG mg/dL    Ketone Negative NEG mg/dL    Blood MODERATE (A) NEG      Urobilinogen 1.0 0.2 - 1.0 EU/dL    Nitrites Negative NEG      Leukocyte Esterase LARGE (A) NEG      WBC  0 - 4 /hpf    RBC 0-5 0 - 5 /hpf    Epithelial cells MODERATE (A) FEW /lpf    Bacteria 2+ (A) NEG /hpf    Mucus 2+ (A) NEG /lpf    Amorphous Crystals 2+ (A) NEG    Granular cast 2-5 (A) NEG /lpf   BILIRUBIN, CONFIRM    Collection Time: 07/15/21  4:16 PM   Result Value Ref Range    Bilirubin UA, confirm Negative NEG     TROPONIN I    Collection Time: 07/15/21  4:30 PM   Result Value Ref Range    Troponin-I, Qt. <0.05 <2.52 ng/mL   METABOLIC PANEL, COMPREHENSIVE    Collection Time: 07/15/21  4:30 PM   Result Value Ref Range    Sodium 132 (L) 136 - 145 mmol/L    Potassium 3.8 3.5 - 5.1 mmol/L    Chloride 102 97 - 108 mmol/L    CO2 23 21 - 32 mmol/L    Anion gap 7 5 - 15 mmol/L    Glucose 99 65 - 100 mg/dL    BUN 23 (H) 6 - 20 MG/DL    Creatinine 1.94 (H) 0.55 - 1.02 MG/DL    BUN/Creatinine ratio 12 12 - 20      GFR est AA 32 (L) >60 ml/min/1.73m2    GFR est non-AA 26 (L) >60 ml/min/1.73m2    Calcium 8.6 8.5 - 10.1 MG/DL    Bilirubin, total 0.5 0.2 - 1.0 MG/DL    ALT (SGPT) 27 12 - 78 U/L    AST (SGOT) 19 15 - 37 U/L    Alk.  phosphatase 89 45 - 117 U/L    Protein, total 6.5 6.4 - 8.2 g/dL    Albumin 2.6 (L) 3.5 - 5.0 g/dL    Globulin 3.9 2.0 - 4.0 g/dL    A-G Ratio 0.7 (L) 1.1 - 2.2     LACTIC ACID    Collection Time: 07/15/21  7:21 PM   Result Value Ref Range    Lactic acid 1.3 0.4 - 2.0 MMOL/L

## 2021-07-16 NOTE — PROGRESS NOTES
Physical Therapy    Pt seen for eval. She is at a CGA/SBA level of function. She would benefit from HHPT post d/c escobar given spinal stenosis and possible need for lami/fusion in the future. Full report to follow.     Rachel Deutsch, PT

## 2021-07-16 NOTE — CONSULTS
Ortho:    Lumbar DDD/DJD,stenosis w/ radiculopathy     Pt seen in the office yesterday 7/15  MRI L-spine 7/11 reviewed with pt and her     Pt is a candidate for revision decompression and fusion L4-S1 as a planned/shceduled out-pt procedure  Recommend medical treatment and optimization, rehab/PT/OT as necessary   Pt will need to be other wise well and functional(able to mobilize and work with therapy services) before considering ortho spine intervention.      Plan per Dr Ruth Brittle, PA-C

## 2021-07-16 NOTE — PROGRESS NOTES
Received message from patient's nurse stating / informing that    Pt hgb dropped from 11.9 12 hrs ago to 9.8 now         Discussion / orders:    · Likely dilutional.  Patient has received 3 L normal saline since admission  · We will recheck hemoglobin and hematocrit in 6 hours  · Stool for occult blood testing           Please note that this note was dictated using Dragon computer voice recognition software. Quite often unanticipated grammatical, syntax, homophones, and other interpretive errors are inadvertently transcribed by the computer software. Please disregard these errors. Please excuse any errors that have escaped final proofreading.

## 2021-07-16 NOTE — ED NOTES
TRANSFER - OUT REPORT:    Verbal report given to Hiral RN(name) on Maria Esther Marie  being transferred to neuro (unit) for routine progression of care       Report consisted of patients Situation, Background, Assessment and   Recommendations(SBAR). Information from the following report(s) SBAR, ED Summary, [facility] and Recent Results was reviewed with the receiving nurse. Lines:   Peripheral IV 07/15/21 Left Hand (Active)   Site Assessment Clean, dry, & intact 07/15/21 1509   Phlebitis Assessment 0 07/15/21 1509   Infiltration Assessment 0 07/15/21 1509   Dressing Status Clean, dry, & intact 07/15/21 1509   Dressing Type Tape;Transparent 07/15/21 1509   Hub Color/Line Status Blue;Capped;Flushed;Patent 07/15/21 1509   Action Taken Blood drawn 07/15/21 1509       Peripheral IV 07/15/21 Right;Posterior Forearm (Active)   Site Assessment Clean, dry, & intact 07/15/21 1954   Phlebitis Assessment 0 07/15/21 1954   Infiltration Assessment 0 07/15/21 1954   Dressing Status Clean, dry, & intact 07/15/21 1954   Dressing Type Tape;Transparent 07/15/21 1954   Hub Color/Line Status Pink;Flushed;Patent 07/15/21 1954   Action Taken Blood drawn 07/15/21 1954   Alcohol Cap Used No 07/15/21 1954        Opportunity for questions and clarification was provided.

## 2021-07-17 LAB
ALBUMIN SERPL-MCNC: 1.9 G/DL (ref 3.5–5)
ALBUMIN/GLOB SERPL: 0.5 {RATIO} (ref 1.1–2.2)
ALP SERPL-CCNC: 78 U/L (ref 45–117)
ALT SERPL-CCNC: 26 U/L (ref 12–78)
ANION GAP SERPL CALC-SCNC: 5 MMOL/L (ref 5–15)
AST SERPL-CCNC: 21 U/L (ref 15–37)
BASOPHILS # BLD: 0 K/UL (ref 0–0.1)
BASOPHILS NFR BLD: 0 % (ref 0–1)
BILIRUB SERPL-MCNC: 0.2 MG/DL (ref 0.2–1)
BUN SERPL-MCNC: 13 MG/DL (ref 6–20)
BUN/CREAT SERPL: 14 (ref 12–20)
CALCIUM SERPL-MCNC: 8.1 MG/DL (ref 8.5–10.1)
CAMPYLOBACTER SPECIES, DNA: NEGATIVE
CHLORIDE SERPL-SCNC: 114 MMOL/L (ref 97–108)
CO2 SERPL-SCNC: 17 MMOL/L (ref 21–32)
CREAT SERPL-MCNC: 0.94 MG/DL (ref 0.55–1.02)
DIFFERENTIAL METHOD BLD: ABNORMAL
ENTEROTOXIGEN E COLI, DNA: NEGATIVE
EOSINOPHIL # BLD: 0.1 K/UL (ref 0–0.4)
EOSINOPHIL NFR BLD: 1 % (ref 0–7)
ERYTHROCYTE [DISTWIDTH] IN BLOOD BY AUTOMATED COUNT: 13.2 % (ref 11.5–14.5)
GLOBULIN SER CALC-MCNC: 3.7 G/DL (ref 2–4)
GLUCOSE SERPL-MCNC: 93 MG/DL (ref 65–100)
HCT VFR BLD AUTO: 30.7 % (ref 35–47)
HGB BLD-MCNC: 10.1 G/DL (ref 11.5–16)
IMM GRANULOCYTES # BLD AUTO: 0 K/UL (ref 0–0.04)
IMM GRANULOCYTES NFR BLD AUTO: 1 % (ref 0–0.5)
LYMPHOCYTES # BLD: 1.4 K/UL (ref 0.8–3.5)
LYMPHOCYTES NFR BLD: 16 % (ref 12–49)
MCH RBC QN AUTO: 31.1 PG (ref 26–34)
MCHC RBC AUTO-ENTMCNC: 32.9 G/DL (ref 30–36.5)
MCV RBC AUTO: 94.5 FL (ref 80–99)
MONOCYTES # BLD: 1 K/UL (ref 0–1)
MONOCYTES NFR BLD: 12 % (ref 5–13)
NEUTS SEG # BLD: 6.1 K/UL (ref 1.8–8)
NEUTS SEG NFR BLD: 70 % (ref 32–75)
NRBC # BLD: 0 K/UL (ref 0–0.01)
NRBC BLD-RTO: 0 PER 100 WBC
P SHIGELLOIDES DNA STL QL NAA+PROBE: NEGATIVE
PLATELET # BLD AUTO: 173 K/UL (ref 150–400)
PMV BLD AUTO: 10.7 FL (ref 8.9–12.9)
POTASSIUM SERPL-SCNC: 4.1 MMOL/L (ref 3.5–5.1)
PROT SERPL-MCNC: 5.6 G/DL (ref 6.4–8.2)
RBC # BLD AUTO: 3.25 M/UL (ref 3.8–5.2)
SALMONELLA SPECIES, DNA: NEGATIVE
SHIGA TOXIN PRODUCING, DNA: NEGATIVE
SHIGELLA SP+EIEC IPAH STL QL NAA+PROBE: NEGATIVE
SODIUM SERPL-SCNC: 136 MMOL/L (ref 136–145)
VIBRIO SPECIES, DNA: NEGATIVE
WBC # BLD AUTO: 8.6 K/UL (ref 3.6–11)
Y. ENTEROCOLITICA, DNA: NEGATIVE

## 2021-07-17 PROCEDURE — 74011250636 HC RX REV CODE- 250/636: Performed by: INTERNAL MEDICINE

## 2021-07-17 PROCEDURE — 74011250637 HC RX REV CODE- 250/637: Performed by: INTERNAL MEDICINE

## 2021-07-17 PROCEDURE — 85025 COMPLETE CBC W/AUTO DIFF WBC: CPT

## 2021-07-17 PROCEDURE — 65660000000 HC RM CCU STEPDOWN

## 2021-07-17 PROCEDURE — 89055 LEUKOCYTE ASSESSMENT FECAL: CPT

## 2021-07-17 PROCEDURE — 74011000258 HC RX REV CODE- 258: Performed by: INTERNAL MEDICINE

## 2021-07-17 PROCEDURE — 2709999900 HC NON-CHARGEABLE SUPPLY

## 2021-07-17 PROCEDURE — 80053 COMPREHEN METABOLIC PANEL: CPT

## 2021-07-17 PROCEDURE — 36415 COLL VENOUS BLD VENIPUNCTURE: CPT

## 2021-07-17 PROCEDURE — 94760 N-INVAS EAR/PLS OXIMETRY 1: CPT

## 2021-07-17 RX ORDER — GABAPENTIN 300 MG/1
1200 CAPSULE ORAL 3 TIMES DAILY
Status: DISCONTINUED | OUTPATIENT
Start: 2021-07-17 | End: 2021-07-20 | Stop reason: HOSPADM

## 2021-07-17 RX ADMIN — Medication 10 ML: at 17:54

## 2021-07-17 RX ADMIN — Medication 10 ML: at 21:40

## 2021-07-17 RX ADMIN — ATORVASTATIN CALCIUM 20 MG: 20 TABLET, FILM COATED ORAL at 08:02

## 2021-07-17 RX ADMIN — SODIUM CHLORIDE 100 ML/HR: 9 INJECTION, SOLUTION INTRAVENOUS at 17:54

## 2021-07-17 RX ADMIN — DULOXETINE 60 MG: 30 CAPSULE, DELAYED RELEASE ORAL at 08:02

## 2021-07-17 RX ADMIN — ACETAMINOPHEN 650 MG: 325 TABLET ORAL at 20:15

## 2021-07-17 RX ADMIN — SODIUM CHLORIDE 100 ML/HR: 9 INJECTION, SOLUTION INTRAVENOUS at 07:58

## 2021-07-17 RX ADMIN — GABAPENTIN 600 MG: 300 CAPSULE ORAL at 08:02

## 2021-07-17 RX ADMIN — ACETAMINOPHEN 650 MG: 325 TABLET ORAL at 03:15

## 2021-07-17 RX ADMIN — ENOXAPARIN SODIUM 30 MG: 30 INJECTION, SOLUTION INTRAVENOUS; SUBCUTANEOUS at 08:02

## 2021-07-17 RX ADMIN — CEFTRIAXONE 1 G: 1 INJECTION, POWDER, FOR SOLUTION INTRAMUSCULAR; INTRAVENOUS at 20:09

## 2021-07-17 RX ADMIN — Medication 10 ML: at 06:14

## 2021-07-17 RX ADMIN — GABAPENTIN 1200 MG: 300 CAPSULE ORAL at 21:39

## 2021-07-17 RX ADMIN — DULOXETINE 60 MG: 30 CAPSULE, DELAYED RELEASE ORAL at 17:54

## 2021-07-17 RX ADMIN — PANTOPRAZOLE SODIUM 40 MG: 40 TABLET, DELAYED RELEASE ORAL at 08:02

## 2021-07-17 NOTE — PROGRESS NOTES
2120: on-call NP notified via [facility] regarding MEWS of 4. PO Tylenol given for fever. 2251: Pt MEWS of 1, temp 98.8.     0324: Pt Mews of 5, message sent to on-call NP and RRRN called. 0424: RRRN making rounds, notified that pt's fever down and she is otherwise asymptomatic.

## 2021-07-17 NOTE — PROGRESS NOTES
Comprehensive Nutrition Assessment    Type and Reason for Visit: Initial, Positive nutrition screen    Nutrition Recommendations/Plan:   · Continue regular diet. Enc po/fluids. · Continue Ensure Enlive po TID with meals. · Please document % meals and supplements consumed in flowsheet I/O's under intake. Nutrition Assessment:     7/17: Chart reviewed; med noted for sepsis, UTI, chronic diarrhea ongoing. Pt reports she has experienced persistent problems for several years due to partial colon removal. Pt reports no relief in diarrhea since admission and unable to identify any foods/beverages as aggravating factors. Pt would like to continue the Ensure shakes as she drinks them. Offered menu and encouraged pt to select meals based on preferences. No data found. Last Weight Metric  Weight Loss Metrics 7/15/2021 7/9/2021 6/22/2021 3/2/2021 2/23/2021 10/16/2018 10/10/2018   Today's Wt 145 lb 144 lb 151 lb 151 lb 0.2 oz 149 lb 7.6 oz 144 lb 144 lb 13.5 oz   BMI 26.52 kg/m2 26.34 kg/m2 27.62 kg/m2 27.62 kg/m2 27.34 kg/m2 26.34 kg/m2 26.49 kg/m2     Estimated Daily Nutrient Needs:  Energy (kcal): 1515 (BMR 1166 x 1.3AF); Weight Used for Energy Requirements: Current  Protein (g): 66 (1.0 g/kg bw); Weight Used for Protein Requirements: Current  Fluid (ml/day): 1500 ml/day; Method Used for Fluid Requirements: 1 ml/kcal    Nutrition Related Findings:  BM: 7/17, persistent diarrhea (c-diff negative); Meds: probiotic (Risaquad), Lipitor, Rocephin; Labs: reviewed      Wounds:    None       Current Nutrition Therapies:  ADULT DIET Regular  ADULT ORAL NUTRITION SUPPLEMENT Lunch, Dinner; Standard High Calorie/High Protein    Anthropometric Measures:  · Height:  5' 2\" (157.5 cm)  · Current Body Wt:  65.8 kg (145 lb 1 oz)   · Ideal Body Wt:  110 lbs:  131.9 %   · BMI Category: Overweight (BMI 25.0-29. 9)       Nutrition Diagnosis:   · Inadequate protein-energy intake related to altered GI function as evidenced by  (mild weight loss and persistent diarrhea)    Nutrition Interventions:   Food and/or Nutrient Delivery: Continue current diet, Continue oral nutrition supplement  Nutrition Education and Counseling: No recommendations at this time  Coordination of Nutrition Care: No recommendation at this time    Goals:  Trend PO intake at least 50% of meals + consume 480 ml ONS next 3-5 days       Nutrition Monitoring and Evaluation:   Behavioral-Environmental Outcomes: None identified  Food/Nutrient Intake Outcomes: Food and nutrient intake, Diet advancement/tolerance, Supplement intake  Physical Signs/Symptoms Outcomes: Biochemical data, GI status, Diarrhea, Weight    Discharge Planning:    Continue current diet     Electronically signed by Jeffrey Real RD on 7/17/2021 at 3:03 PM

## 2021-07-17 NOTE — PROGRESS NOTES
Hospitalist Progress Note    NAME: Chaim Lin   :  1955   MRN:  [id number]       Assessment / Plan:  Sepsis due to urinary tract infection  Persistent fever  -Urinalysis with pyuria and bacteriuria. -Urine culture with gram-negative rods. -Blood cultures NGTD  -Continue Rocephin, still having fevers. May need to expand coverage. Will repeat CBC and procalcitonin    Chronic diarrhea x several weeks  -recent colonoscopy 21 by Dr Rachel Garcia  Findings:   1. Scope advanced to the cecum and terminal ileum. 2.  There was a discrete ulcer seen in the sigmoid colon s/p Bx.  R/O NSAID vs. Ischemic vs. IBD  3. No polyps. Normal remaining mucosa in the colon and TI. S/P Bx of the TI  4. Grade 2 internal hemorrhoids.    -C. difficile negative  -Send stool for fecal leukocytes and enteric bacterial panel  -imodium prn  -will ask Dr Rachel Garcia to see on Monday    Bilateral lower extremity numbness  Unsteady gait  History of degenerative disease of the spine  -MRI of the brain IMPRESSION  1. No acute infarct. 2. Signal abnormality in the vidya which has progressed since 2013. Chronic left  basal ganglia lacunar infarct. Findings consistent with chronic small vessel  ischemic change.  -She had a recent MRI of the spine which shows evidence of severe stenosis  -Orthopedic input appreciated. \"Pt is a candidate for revision decompression and fusion L4-S1 as a planned/shceduled out-pt procedure. Recommend medical treatment and optimization, rehab/PT/OT as necessary. Pt will need to be other wise well and functional(able to mobilize and work with therapy services) before considering ortho spine intervention. \"     -PT OT eval and treat.  Plan for home health on discharge    JESENIA, resolved  -Due to prerenal factors/volume depletion.  -Taper IV fluids    GERD  Neuropathy  Dyslipidemia  Continue PPI, Cymbalta, gabapentin and Lipitor      Code Status: Full code  Surrogate Decision Maker:  David  DVT Prophylaxis: Lovenox  GI Prophylaxis: not indicated  Baseline: From home, with gradual decline in mobility since last few months due to weakness and numbness     Subjective:     Chief Complaint / Reason for Physician Visit  Follow-up sepsis, UTI, back pain, JESENIA  Tolerating diet. Still complaining of diarrhea, several bouts of watery stools per day    Review of Systems:  Symptom Y/N Comments  Symptom Y/N Comments   Fever/Chills n   Chest Pain n    Poor Appetite    Edema     Cough    Abdominal Pain n    Sputum    Joint Pain     SOB/MOORE n   Pruritis/Rash     Nausea/vomit    Tolerating PT/OT     Diarrhea y   Tolerating Diet     Constipation    Other       Could NOT obtain due to:      Objective:     VITALS:   Last 24hrs VS reviewed since prior progress note. Most recent are:  Patient Vitals for the past 24 hrs:   Temp Pulse Resp BP SpO2   07/17/21 1031 98.4 °F (36.9 °C) 94 17 137/72 99 %   07/17/21 0726 98.6 °F (37 °C) 92 16 133/79 97 %   07/17/21 0435 99.2 °F (37.3 °C)       07/17/21 0324 (!) 101.1 °F (38.4 °C) (!) 117 18 (!) 153/83 96 %   07/16/21 2251 98.8 °F (37.1 °C) 99 15 114/64 98 %   07/16/21 2107 (!) 101.8 °F (38.8 °C) (!) 102 17 (!) 129/58 99 %   07/16/21 1516 98 °F (36.7 °C) 94 18 (!) 103/59 99 %     No intake or output data in the 24 hours ending 07/17/21 1329     PHYSICAL EXAM:  General: WD, WN. Alert, cooperative, no acute distress    EENT:  EOMI. Anicteric sclerae. MMM  Resp:  CTA bilaterally, no wheezing or rales. No accessory muscle use  CV:  Regular  rhythm,  No edema  GI:  Soft, Non distended, Non tender.  +Bowel sounds  Neurologic:  Alert and oriented X 3, normal speech,   Psych:   Good insight. Not anxious nor agitated  Skin:  No rashes.   No jaundice    Reviewed most current lab test results and cultures  YES  Reviewed most current radiology test results   YES  Review and summation of old records today    NO  Reviewed patient's current orders and MAR    YES  PMH/SH reviewed - no change compared to H&P  ________________________________________________________________________  Care Plan discussed with:    Comments   Patient y    Family  y  in room    RN y    Care Manager     Consultant                       y Multidiciplinary team rounds were held today with , nursing, pharmacist and clinical coordinator. Patient's plan of care was discussed; medications were reviewed and discharge planning was addressed. ________________________________________________________________________  Total NON critical care TIME:  25   Minutes    Total CRITICAL CARE TIME Spent:   Minutes non procedure based      Comments   >50% of visit spent in counseling and coordination of care y    ________________________________________________________________________  Jude Akers MD     Procedures: see electronic medical records for all procedures/Xrays and details which were not copied into this note but were reviewed prior to creation of Plan. LABS:  I reviewed today's most current labs and imaging studies. Pertinent labs include:  Recent Labs     07/17/21  0109 07/16/21  1020 07/16/21  0302 07/15/21  1534 07/15/21  1534   WBC 8.6  --  10.0  --  15.1*   HGB 10.1* 10.8* 9.8*   < > 11.9   HCT 30.7* 33.7* 29.4*   < > 36.2     --  147*  --  173    < > = values in this interval not displayed.      Recent Labs     07/17/21  0109 07/16/21  0302 07/15/21  1630    138 132*   K 4.1 3.6 3.8   * 110* 102   CO2 17* 18* 23   GLU 93 90 99   BUN 13 19 23*   CREA 0.94 1.21* 1.94*   CA 8.1* 7.8* 8.6   ALB 1.9* 2.1* 2.6*   TBILI 0.2 0.4 0.5   ALT 26 22 27       Signed: Jude Akers MD

## 2021-07-17 NOTE — PROGRESS NOTES
End of Shift Note    Bedside shift change report given to Hiral (oncoming nurse) by Rangel Delgado (offgoing nurse). Report included the following information SBAR, Kardex, Intake/Output, MAR and Recent Results    Shift worked:  3587-5557     Shift summary and any significant changes:     Pt spiked fever x2 overnight. Given PO tylenol with relief noted each time. High MEWS, provider notified. Concerns for physician to address:  MEWS scores, fever     Zone phone for oncoming shift:          Activity:  Activity Level: Up with Assistance  Number times ambulated in hallways past shift: 0  Number of times OOB to chair past shift: 0    Cardiac:   Cardiac Monitoring: Yes      Cardiac Rhythm: Sinus Tach    Access:   Current line(s): PIV     Genitourinary:   Urinary status: voiding    Respiratory:   O2 Device: None (Room air)  Chronic home O2 use?: NO  Incentive spirometer at bedside: NO     GI:  Last Bowel Movement Date: 07/16/21  Current diet:  ADULT DIET Regular  Passing flatus: YES  Tolerating current diet: YES       Pain Management:   Patient states pain is manageable on current regimen: YES    Skin:  Bryant Score: 18  Interventions: increase time out of bed    Patient Safety:  Fall Score:  Total Score: 4  Interventions: bed/chair alarm  High Fall Risk: Yes    Length of Stay:  Expected LOS: 3d 12h  Actual LOS: 2      Rangel Delgado

## 2021-07-17 NOTE — PROGRESS NOTES
Bedside and Verbal shift change report given to Chhaya Tran (oncoming nurse) by Selvin Louie (offgoing nurse). Report included the following information SBAR, Kardex, Intake/Output, MAR and Recent Results.

## 2021-07-17 NOTE — PROGRESS NOTES
End of Shift Note    Bedside shift change report given to [address] [place] [address] (oncoming nurse) by Steffi Zheng (offgoing nurse).   Report included the following information SBAR, Kardex, ED Summary, Intake/Output, MAR and Recent Results    Shift worked:  7-7pm     Shift summary and any significant changes:     no significant changes, patient afebrile during the shift ,watery stool during the shift     Concerns for physician to address:      Zone phone for oncoming shift:

## 2021-07-18 ENCOUNTER — APPOINTMENT (OUTPATIENT)
Dept: CT IMAGING | Age: 66
DRG: 871 | End: 2021-07-18
Attending: INTERNAL MEDICINE
Payer: MEDICARE

## 2021-07-18 LAB
ALBUMIN SERPL-MCNC: 2 G/DL (ref 3.5–5)
ALBUMIN/GLOB SERPL: 0.6 {RATIO} (ref 1.1–2.2)
ALP SERPL-CCNC: 73 U/L (ref 45–117)
ALT SERPL-CCNC: 32 U/L (ref 12–78)
ANION GAP SERPL CALC-SCNC: 6 MMOL/L (ref 5–15)
AST SERPL-CCNC: 27 U/L (ref 15–37)
BACTERIA SPEC CULT: ABNORMAL
BASOPHILS # BLD: 0 K/UL (ref 0–0.1)
BASOPHILS NFR BLD: 0 % (ref 0–1)
BILIRUB SERPL-MCNC: 0.2 MG/DL (ref 0.2–1)
BUN SERPL-MCNC: 7 MG/DL (ref 6–20)
BUN/CREAT SERPL: 10 (ref 12–20)
CALCIUM SERPL-MCNC: 8.8 MG/DL (ref 8.5–10.1)
CC UR VC: ABNORMAL
CHLORIDE SERPL-SCNC: 116 MMOL/L (ref 97–108)
CO2 SERPL-SCNC: 22 MMOL/L (ref 21–32)
CREAT SERPL-MCNC: 0.73 MG/DL (ref 0.55–1.02)
DIFFERENTIAL METHOD BLD: ABNORMAL
EOSINOPHIL # BLD: 0.1 K/UL (ref 0–0.4)
EOSINOPHIL NFR BLD: 2 % (ref 0–7)
ERYTHROCYTE [DISTWIDTH] IN BLOOD BY AUTOMATED COUNT: 13.2 % (ref 11.5–14.5)
GLOBULIN SER CALC-MCNC: 3.6 G/DL (ref 2–4)
GLUCOSE SERPL-MCNC: 100 MG/DL (ref 65–100)
HCT VFR BLD AUTO: 30.6 % (ref 35–47)
HGB BLD-MCNC: 10.1 G/DL (ref 11.5–16)
IMM GRANULOCYTES # BLD AUTO: 0 K/UL (ref 0–0.04)
IMM GRANULOCYTES NFR BLD AUTO: 0 % (ref 0–0.5)
LYMPHOCYTES # BLD: 1.4 K/UL (ref 0.8–3.5)
LYMPHOCYTES NFR BLD: 22 % (ref 12–49)
MCH RBC QN AUTO: 30.8 PG (ref 26–34)
MCHC RBC AUTO-ENTMCNC: 33 G/DL (ref 30–36.5)
MCV RBC AUTO: 93.3 FL (ref 80–99)
MONOCYTES # BLD: 0.8 K/UL (ref 0–1)
MONOCYTES NFR BLD: 13 % (ref 5–13)
NEUTS SEG # BLD: 3.9 K/UL (ref 1.8–8)
NEUTS SEG NFR BLD: 63 % (ref 32–75)
NRBC # BLD: 0 K/UL (ref 0–0.01)
NRBC BLD-RTO: 0 PER 100 WBC
PLATELET # BLD AUTO: 213 K/UL (ref 150–400)
PMV BLD AUTO: 10.1 FL (ref 8.9–12.9)
POTASSIUM SERPL-SCNC: 3.6 MMOL/L (ref 3.5–5.1)
PROCALCITONIN SERPL-MCNC: 0.17 NG/ML
PROT SERPL-MCNC: 5.6 G/DL (ref 6.4–8.2)
RBC # BLD AUTO: 3.28 M/UL (ref 3.8–5.2)
SERVICE CMNT-IMP: ABNORMAL
SODIUM SERPL-SCNC: 144 MMOL/L (ref 136–145)
WBC # BLD AUTO: 6.3 K/UL (ref 3.6–11)
WBC #/AREA STL HPF: NORMAL /HPF (ref 0–4)

## 2021-07-18 PROCEDURE — 74011250636 HC RX REV CODE- 250/636: Performed by: INTERNAL MEDICINE

## 2021-07-18 PROCEDURE — 74011250637 HC RX REV CODE- 250/637: Performed by: INTERNAL MEDICINE

## 2021-07-18 PROCEDURE — 97165 OT EVAL LOW COMPLEX 30 MIN: CPT | Performed by: OCCUPATIONAL THERAPIST

## 2021-07-18 PROCEDURE — 74011000258 HC RX REV CODE- 258: Performed by: INTERNAL MEDICINE

## 2021-07-18 PROCEDURE — 74011000636 HC RX REV CODE- 636: Performed by: INTERNAL MEDICINE

## 2021-07-18 PROCEDURE — 84145 PROCALCITONIN (PCT): CPT

## 2021-07-18 PROCEDURE — 80053 COMPREHEN METABOLIC PANEL: CPT

## 2021-07-18 PROCEDURE — 85025 COMPLETE CBC W/AUTO DIFF WBC: CPT

## 2021-07-18 PROCEDURE — 36415 COLL VENOUS BLD VENIPUNCTURE: CPT

## 2021-07-18 PROCEDURE — 65660000000 HC RM CCU STEPDOWN

## 2021-07-18 PROCEDURE — 97535 SELF CARE MNGMENT TRAINING: CPT | Performed by: OCCUPATIONAL THERAPIST

## 2021-07-18 PROCEDURE — 74176 CT ABD & PELVIS W/O CONTRAST: CPT

## 2021-07-18 RX ORDER — DIPHENOXYLATE HYDROCHLORIDE AND ATROPINE SULFATE 2.5; .025 MG/1; MG/1
1 TABLET ORAL
Status: DISCONTINUED | OUTPATIENT
Start: 2021-07-18 | End: 2021-07-20 | Stop reason: HOSPADM

## 2021-07-18 RX ORDER — LOPERAMIDE HYDROCHLORIDE 2 MG/1
2 CAPSULE ORAL
Status: DISCONTINUED | OUTPATIENT
Start: 2021-07-18 | End: 2021-07-18

## 2021-07-18 RX ADMIN — Medication 10 ML: at 05:40

## 2021-07-18 RX ADMIN — DULOXETINE 60 MG: 30 CAPSULE, DELAYED RELEASE ORAL at 08:05

## 2021-07-18 RX ADMIN — IOHEXOL 50 ML: 240 INJECTION, SOLUTION INTRATHECAL; INTRAVASCULAR; INTRAVENOUS; ORAL at 16:34

## 2021-07-18 RX ADMIN — CEFTRIAXONE 1 G: 1 INJECTION, POWDER, FOR SOLUTION INTRAMUSCULAR; INTRAVENOUS at 20:06

## 2021-07-18 RX ADMIN — DIPHENOXYLATE HYDROCHLORIDE AND ATROPINE SULFATE 1 TABLET: 2.5; .025 TABLET ORAL at 14:04

## 2021-07-18 RX ADMIN — SODIUM CHLORIDE 100 ML/HR: 9 INJECTION, SOLUTION INTRAVENOUS at 05:37

## 2021-07-18 RX ADMIN — PANTOPRAZOLE SODIUM 40 MG: 40 TABLET, DELAYED RELEASE ORAL at 08:05

## 2021-07-18 RX ADMIN — GABAPENTIN 1200 MG: 300 CAPSULE ORAL at 18:15

## 2021-07-18 RX ADMIN — GABAPENTIN 1200 MG: 300 CAPSULE ORAL at 21:11

## 2021-07-18 RX ADMIN — Medication 10 ML: at 14:04

## 2021-07-18 RX ADMIN — ATORVASTATIN CALCIUM 20 MG: 20 TABLET, FILM COATED ORAL at 08:05

## 2021-07-18 RX ADMIN — ENOXAPARIN SODIUM 30 MG: 30 INJECTION, SOLUTION INTRAVENOUS; SUBCUTANEOUS at 08:06

## 2021-07-18 RX ADMIN — DULOXETINE 60 MG: 30 CAPSULE, DELAYED RELEASE ORAL at 18:15

## 2021-07-18 RX ADMIN — Medication 1 CAPSULE: at 08:05

## 2021-07-18 RX ADMIN — Medication 10 ML: at 21:11

## 2021-07-18 RX ADMIN — GABAPENTIN 1200 MG: 300 CAPSULE ORAL at 08:05

## 2021-07-18 NOTE — PROGRESS NOTES
Hospitalist Progress Note    NAME: Carroll Dangelo   :  1955   MRN:  [id number]       Assessment / Plan:  Sepsis due to urinary tract infection  -Urinalysis with pyuria and bacteriuria. -Urine culture Proteus  -Blood cultures NGTD  -Continue Rocephin, still having fevers. May need to expand coverage. Will repeat CBC and procalcitonin    Abdominal pain  Chronic diarrhea x several weeks  -recent colonoscopy 21 by Dr Halina Gonzalez  Findings:   1. Scope advanced to the cecum and terminal ileum. 2.  There was a discrete ulcer seen in the sigmoid colon s/p Bx.  R/O NSAID vs. Ischemic vs. IBD  3. No polyps. Normal remaining mucosa in the colon and TI. S/P Bx of the TI  4. Grade 2 internal hemorrhoids.    -C. difficile negative; enteric bacteria neg, fecal leukocytes 0-4  -imodium does not work for her, will change to lomotil prn  -will check CT abdomen and pelvis to screen for kidney stones and colitis   -will ask Dr Halina Gonzalez to see on Monday    Bilateral lower extremity numbness  Unsteady gait  History of degenerative disease of the spine  -MRI of the brain IMPRESSION  1. No acute infarct. 2. Signal abnormality in the vidya which has progressed since 2013. Chronic left  basal ganglia lacunar infarct. Findings consistent with chronic small vessel  ischemic change.  -She had a recent MRI of the spine which shows evidence of severe stenosis  -Orthopedic input appreciated. \"Pt is a candidate for revision decompression and fusion L4-S1 as a planned/shceduled out-pt procedure. Recommend medical treatment and optimization, rehab/PT/OT as necessary. Pt will need to be other wise well and functional(able to mobilize and work with therapy services) before considering ortho spine intervention. \"     -PT OT eval and treat.  Plan for home health on discharge    JESENIA, resolved  -Due to prerenal factors/volume depletion.  -Taper IV fluids    GERD  Neuropathy  Dyslipidemia  Continue PPI, Cymbalta, gabapentin and Lipitor      Code Status: Full code  Surrogate Decision Maker:  Cristina Letters  DVT Prophylaxis: Lovenox  GI Prophylaxis: not indicated  Baseline: From home, with gradual decline in mobility since last few months due to weakness and numbness     Subjective:     Chief Complaint / Reason for Physician Visit  Follow-up sepsis, UTI, back pain, JESENIA  Tolerating diet. Still complaining of diarrhea and reports more abdominal discomfort today      Review of Systems:  Symptom Y/N Comments  Symptom Y/N Comments   Fever/Chills n   Chest Pain n    Poor Appetite    Edema     Cough    Abdominal Pain y    Sputum    Joint Pain     SOB/MOORE n   Pruritis/Rash     Nausea/vomit    Tolerating PT/OT     Diarrhea y   Tolerating Diet     Constipation    Other       Could NOT obtain due to:      Objective:     VITALS:   Last 24hrs VS reviewed since prior progress note. Most recent are:  Patient Vitals for the past 24 hrs:   Temp Pulse Resp BP SpO2   07/18/21 0828 97.8 °F (36.6 °C) 88 18 (!) 154/81 99 %   07/18/21 0235 98 °F (36.7 °C) 86 17 132/86 97 %   07/17/21 2236 98.4 °F (36.9 °C) 89 18 (!) 141/82 98 %   07/17/21 1913 99.1 °F (37.3 °C) 83 17 (!) 143/87 98 %   07/17/21 1550 98.3 °F (36.8 °C) 83 18 (!) 133/94 99 %       Intake/Output Summary (Last 24 hours) at 7/18/2021 1219  Last data filed at 7/17/2021 1754  Gross per 24 hour   Intake 700 ml   Output    Net 700 ml        PHYSICAL EXAM:  General: WD, WN. Alert, cooperative, no acute distress    EENT:  EOMI. Anicteric sclerae. MMM  Resp:  CTA bilaterally, no wheezing or rales. No accessory muscle use  CV:  Regular  rhythm,  No edema  GI:  Soft, Non distended, not significantly tender.  +Bowel sounds  Neurologic:  Alert and oriented X 3, normal speech,   Psych:   Good insight. Not anxious nor agitated  Skin:  No rashes.   No jaundice    Reviewed most current lab test results and cultures  YES  Reviewed most current radiology test results   YES  Review and summation of old records today NO  Reviewed patient's current orders and MAR    YES  PMH/SH reviewed - no change compared to H&P  ________________________________________________________________________  Care Plan discussed with:    Comments   Patient y    Family      RN y    Care Manager     Consultant                        Multidiciplinary team rounds were held today with , nursing, pharmacist and clinical coordinator. Patient's plan of care was discussed; medications were reviewed and discharge planning was addressed. ________________________________________________________________________  Total NON critical care TIME:  25   Minutes    Total CRITICAL CARE TIME Spent:   Minutes non procedure based      Comments   >50% of visit spent in counseling and coordination of care y    ________________________________________________________________________  Orquidea Delcid MD     Procedures: see electronic medical records for all procedures/Xrays and details which were not copied into this note but were reviewed prior to creation of Plan. LABS:  I reviewed today's most current labs and imaging studies. Pertinent labs include:  Recent Labs     07/18/21 0238 07/17/21 0109 07/16/21  1020 07/16/21  0302 07/16/21  0302   WBC 6.3 8.6  --   --  10.0   HGB 10.1* 10.1* 10.8*   < > 9.8*   HCT 30.6* 30.7* 33.7*   < > 29.4*    173  --   --  147*    < > = values in this interval not displayed.      Recent Labs     07/18/21  0238 07/17/21  0109 07/16/21  0302    136 138   K 3.6 4.1 3.6   * 114* 110*   CO2 22 17* 18*    93 90   BUN 7 13 19   CREA 0.73 0.94 1.21*   CA 8.8 8.1* 7.8*   ALB 2.0* 1.9* 2.1*   TBILI 0.2 0.2 0.4   ALT 32 26 22       Signed: Orquidea Delcid MD

## 2021-07-18 NOTE — PROGRESS NOTES
End of Shift Note    Bedside shift change report given to Primitivo Michael (oncoming nurse) by Kiersten Kennedy (offgoing nurse). Report included the following information SBAR, Kardex, ED Summary, Intake/Output, MAR and Recent Results    Shift worked:  7-7pm     Shift summary and any significant changes:     No significant changes.  Pt was afebrile during the shift   Concerns for physician to address:      Zone phone for oncoming shift:

## 2021-07-18 NOTE — PROGRESS NOTES
End of Shift Note    Bedside shift change report given to Hrial (oncoming nurse) by Levi Cunningham (offgoing nurse). Report included the following information SBAR, Kardex, Intake/Output, MAR and Recent Results    Shift worked:  1233-2382     Shift summary and any significant changes:     No significant changes, pt remained afebrile overnight. Stool sample collected and sent to lab. Concerns for physician to address:  none     Zone phone for oncoming shift:          Activity:  Activity Level: Up with Assistance  Number times ambulated in hallways past shift: 0  Number of times OOB to chair past shift: 0    Cardiac:   Cardiac Monitoring: Yes      Cardiac Rhythm: Sinus Rhythm, Sinus Tach    Access:   Current line(s): PIV     Genitourinary:   Urinary status: voiding    Respiratory:   O2 Device: None (Room air)  Chronic home O2 use?: NO  Incentive spirometer at bedside: YES     GI:  Last Bowel Movement Date: 07/17/21  Current diet:  ADULT DIET Regular  ADULT ORAL NUTRITION SUPPLEMENT Lunch, Dinner; Standard High Calorie/High Protein  Passing flatus: YES  Tolerating current diet: YES       Pain Management:   Patient states pain is manageable on current regimen: N/A    Skin:  Bryant Score: 18  Interventions: increase time out of bed    Patient Safety:  Fall Score:  Total Score: 4  Interventions: pt to call before getting OOB  High Fall Risk: Yes    Length of Stay:  Expected LOS: 3d 12h  Actual LOS: 3      Levi Cunningham

## 2021-07-18 NOTE — PROGRESS NOTES
Problem: Self Care Deficits Care Plan (Adult)  Goal: *Acute Goals and Plan of Care (Insert Text)  Outcome: Not Met  Note:   FUNCTIONAL STATUS PRIOR TO ADMISSION: Patient was independent and active without use of DME.     HOME SUPPORT: The patient lived with  but did not require assist.  Kaya Lvoe increased A lately, however still completing ADLs on own      Occupational Therapy Goals  Initiated 7/18/2021  1. Patient will perform bathing with modified independence within 7 day(s). 2.  Patient will perform lower body dressing with modified independence and AE PRN within 7 day(s). 3.  Patient will perform standing ADLs for 5 minutes without LOB with modified independence within 7 day(s). 4.  Patient will perform toilet transfers with modified independence within 7 day(s). 5.  Patient will perform all aspects of toileting with modified independence within 7 day(s). 6.  Patient will participate in upper extremity therapeutic exercise/activities with modified independence for 10 minutes within 7 day(s). 7.  Patient will utilize energy conservation techniques during functional activities with verbal cues within 7 day(s). OCCUPATIONAL THERAPY EVALUATION  Patient: Chepe Velasquez (21 y.o. female)  Date: 7/18/2021  Primary Diagnosis: Acute metabolic encephalopathy [X10.08]  UTI (urinary tract infection) [N39.0]        Precautions: activity as tolerated  Fall    ASSESSMENT  Based on the objective data described below, the patient presents with decreased I with self care tasks due to decreased endurance, decreased sensation B LE, and decreased dynamic standing balance s/p admission for UTI with sepsis. Per ortho, pt also needs Revision/decompression L4-S1 and is to work with therapy prior to surgery date being set. Pt pleasant, able to complete ADLs with CGA-min A overall, needing increased cues to manage RW during functional activities and transfers. Pt lives with  who can A upon discharge.   Pt may benefit from [address] vs no further services, pending progress with acute OT. Current Level of Function Impacting Discharge (ADLs/self-care): CGA to min A for ADLs    Functional Outcome Measure: The patient scored Total: 60/100 on the Barthel Index outcome measure which is indicative of 40% impaired ability to care for basic self needs/dependency on others; Other factors to consider for discharge: has  who can supervise upon discharge. Patient will benefit from skilled therapy intervention to address the above noted impairments. PLAN :  Recommendations and Planned Interventions: self care training, functional mobility training, therapeutic exercise, therapeutic activities, endurance activities, neuromuscular re-education, patient education, and home safety training    Frequency/Duration: Patient will be followed by occupational therapy 4 times a week to address goals. Recommendation for discharge: (in order for the patient to meet his/her long term goals)  Occupational therapy at least 2 days/week in the home AND ensure assist and/or supervision for safety with IADLs and driving as before    This discharge recommendation:  Has not yet been discussed the attending provider and/or case management    IF patient discharges home will need the following DME: AE: long handled bathing and AE: long handled dressing       SUBJECTIVE:   Patient stated My feet are numb, but my other pain is better.     OBJECTIVE DATA SUMMARY:   HISTORY:   Past Medical History:   Diagnosis Date    Arthritis     RHEUMATOID AND OSTEOARTHRITIS    Breast cancer ([facility] [address] 2008    right    C. difficile diarrhea 2004    as of 10/23/14 pt denies any diarrhea or sx    Cancer ([facility] [address] 1982    uterine CA, hysterectomy, no chemo / radiation    Cancer ([facility] [address] 2008    Rt BR; lumpectomy, radiation with chemo pill x6 yrs    Fibromyalgia     GERD (gastroesophageal reflux disease)     Hypercholesteremia     Neuropathy     Osteoarthritis Other ill-defined conditions(799.89)     GRANULOMA ANEULARE, SKIN DISORDER    Psychiatric disorder     ANXIETY    Rheumatoid arthritis ([facility] [address]     S/P radiation therapy 2008    right breast    Seasonal allergies      Past Surgical History:   Procedure Laterality Date    COLONOSCOPY N/A 5/25/2018    COLONOSCOPY performed by Mary Beth Brannon MD at [facility] ENDOSCOPY    COLONOSCOPY N/A 7/9/2021    COLONOSCOPY performed by Pretty Jackson MD at [facility] ENDOSCOPY    HX APPENDECTOMY      HX CARPAL TUNNEL RELEASE Bilateral     HX CERVICAL DISKECTOMY  2/3/14    C4-5     HX COLONOSCOPY      HX GI      ENDOSCOPY (DILATATION/ESOPH)    HX HYSTERECTOMY  1982    partial    HX KNEE ARTHROSCOPY Left     x2    HX LUMBAR DISKECTOMY  06/2018    HX MOHS PROCEDURES Right     HX ORTHOPAEDIC  2005, 2011    CERVICAL NECKx 3    HX ORTHOPAEDIC Bilateral 1988    carpal tunnel repair, and bilater knee arthroscopy    HX ORTHOPAEDIC Left     shoulder arthroscopy    HX OTHER SURGICAL  3/2008    hematoma s/p Rt BR lumpectomy    HX TONSILLECTOMY  1963    T&A    MRI GUIDE BREAST BX LT Left     DE ABDOMEN SURGERY PROC UNLISTED  1998    COLECTOMY (1/2 REMOVED; \"WAS IN KNOTS\")    DE BREAST SURGERY PROCEDURE UNLISTED Right 2/2008    RIGHT BREAST LUMPECTOMY       Expanded or extensive additional review of patient history:     Home Situation  Home Environment: Private residence  # Steps to Enter: 4  Rails to Enter: Yes  Hand Rails : Right  One/Two Story Residence: Two story, live on 1st floor  Living Alone: No  Support Systems: Spouse/Significant Other/Partner ( can A)  Patient Expects to be Discharged to[de-identified] House  Current DME Used/Available at Home: Sherol Shaun, rolling, Sherol Shaun, rollator, Commode, bedside, Shower chair, Grab bars (in shower)  Tub or Shower Type: Shower    Hand dominance: Right    EXAMINATION OF PERFORMANCE DEFICITS:  Cognitive/Behavioral Status:  Neurologic State: Alert  Orientation Level: Appropriate for age  Cognition: Appropriate decision making; Follows commands  Perception: Appears intact  Perseveration: No perseveration noted  Safety/Judgement: Awareness of environment; Fall prevention    Skin: UE intact    Edema: UE intact    Hearing: Auditory  Auditory Impairment: None    Vision/Perceptual:                                     Range of Motion:    AROM: Generally decreased, functional                         Strength:    Strength: Generally decreased, functional                Coordination:  Coordination: Generally decreased, functional  Fine Motor Skills-Upper: Left Intact; Right Intact    Gross Motor Skills-Upper: Left Intact; Right Intact    Tone & Sensation:       Sensation: Intact (decreased B feet)                      Balance:  Sitting: Intact  Standing: Impaired  Standing - Static: Good  Standing - Dynamic : Fair    Functional Mobility and Transfers for ADLs:  Bed Mobility:  Supine to Sit: Supervision  Scooting: Supervision    Transfers:  Sit to Stand: Contact guard assistance  Stand to Sit: Contact guard assistance (increased cues to RW management)  Bed to Chair: Contact guard assistance  Bathroom Mobility: Contact guard assistance (increased cues for RW management)  Toilet Transfer : Contact guard assistance  Assistive Device : Walker, rolling;Gait Belt    ADL Assessment:  Feeding: Setup    Oral Facial Hygiene/Grooming: Setup    Bathing: Contact guard assistance    Upper Body Dressing: Setup    Lower Body Dressing: Contact guard assistance    Toileting: Contact guard assistance                ADL Intervention and task modifications:       Grooming  Grooming Assistance: Supervision  Position Performed: Standing  Washing Hands: Supervision; Compensatory technique training (cues for RW)  Cues: Verbal cues provided;Visual cues provided                   Lower Body Dressing Assistance  Dressing Assistance: Supervision  Socks: Supervision; Compensatory technique training  Leg Crossed Method Used: Yes  Position Performed: Seated edge of bed  Cues: Verbal cues provided;Visual cues provided    Toileting  Toileting Assistance: Contact guard assistance  Bladder Hygiene: Supervision  Bowel Hygiene: Supervision  Clothing Management: Contact guard assistance  Cues: Verbal cues provided;Visual cues provided;Physical assistance for pants down  Adaptive Equipment: Grab bars    Cognitive Retraining  Safety/Judgement: Awareness of environment; Fall prevention      Functional Measure:  Barthel Index:    Bathin  Bladder: 5  Bowels: 10  Groomin  Dressin  Feeding: 10  Mobility: 10  Stairs: 0  Toilet Use: 5  Transfer (Bed to Chair and Back): 10  Total: 60/100        The Barthel ADL Index: Guidelines  1. The index should be used as a record of what a patient does, not as a record of what a patient could do. 2. The main aim is to establish degree of independence from any help, physical or verbal, however minor and for whatever reason. 3. The need for supervision renders the patient not independent. 4. A patient's performance should be established using the best available evidence. Asking the patient, friends/relatives and nurses are the usual sources, but direct observation and common sense are also important. However direct testing is not needed. 5. Usually the patient's performance over the preceding 24-48 hours is important, but occasionally longer periods will be relevant. 6. Middle categories imply that the patient supplies over 50 per cent of the effort. 7. Use of aids to be independent is allowed. Ira Weaver., Barthel, D.W. (0893). Functional evaluation: the Barthel Index. [address] [facility] (14)2. RICHARD Oliva, Getachew Mancilla., Luz Maria Hawk., [address] (). Measuring the change indisability after inpatient rehabilitation; comparison of the responsiveness of the Barthel Index and Functional Spring Measure. Journal of Neurology, Neurosurgery, and Psychiatry, 66(4), 426-434.   Adelita Mendez, N.BLANCA.A, ELISSA LloydJJAMIE, & Herminia Rodriguez M.A. (2004.) Assessment of post-stroke quality of life in cost-effectiveness studies: The usefulness of the Barthel Index and the EuroQoL-5D. Quality of Life Research, 15, 414-25         Occupational Therapy Evaluation Charge Determination   History Examination Decision-Making   LOW Complexity : Brief history review  MEDIUM Complexity : 3-5 performance deficits relating to physical, cognitive , or psychosocial skils that result in activity limitations and / or participation restrictions MEDIUM Complexity : Patient may present with comorbidities that affect occupational performnce. Miniml to moderate modification of tasks or assistance (eg, physical or verbal ) with assesment(s) is necessary to enable patient to complete evaluation       Based on the above components, the patient evaluation is determined to be of the following complexity level: LOW   Pain Rating:  No c.o pain during session. Activity Tolerance:   Good    After treatment patient left in no apparent distress:    Sitting in chair, Call bell within reach, and RN aware and OK, feet propped on foot rest, tray in front. COMMUNICATION/EDUCATION:   The patients plan of care was discussed with: Registered nurse. Home safety education was provided and the patient/caregiver indicated understanding., Patient/family have participated as able in goal setting and plan of care. , and Patient understands intent and goals of therapy, but is neutral about his/her participation. This patients plan of care is appropriate for delegation to [facility].     Thank you for this referral.  Jonas Soler OTR/L  Time Calculation: 38 mins

## 2021-07-19 ENCOUNTER — APPOINTMENT (OUTPATIENT)
Dept: ULTRASOUND IMAGING | Age: 66
DRG: 871 | End: 2021-07-19
Attending: PHYSICIAN ASSISTANT
Payer: MEDICARE

## 2021-07-19 PROCEDURE — 65660000000 HC RM CCU STEPDOWN

## 2021-07-19 PROCEDURE — 74011250637 HC RX REV CODE- 250/637: Performed by: INTERNAL MEDICINE

## 2021-07-19 PROCEDURE — 97116 GAIT TRAINING THERAPY: CPT | Performed by: PHYSICAL THERAPIST

## 2021-07-19 PROCEDURE — 74011250636 HC RX REV CODE- 250/636: Performed by: INTERNAL MEDICINE

## 2021-07-19 PROCEDURE — 97530 THERAPEUTIC ACTIVITIES: CPT | Performed by: PHYSICAL THERAPIST

## 2021-07-19 PROCEDURE — 76700 US EXAM ABDOM COMPLETE: CPT

## 2021-07-19 PROCEDURE — 97535 SELF CARE MNGMENT TRAINING: CPT

## 2021-07-19 PROCEDURE — 74011250637 HC RX REV CODE- 250/637: Performed by: PHYSICIAN ASSISTANT

## 2021-07-19 RX ADMIN — ACETAMINOPHEN 650 MG: 325 TABLET ORAL at 22:40

## 2021-07-19 RX ADMIN — Medication 10 ML: at 05:57

## 2021-07-19 RX ADMIN — GABAPENTIN 1200 MG: 300 CAPSULE ORAL at 18:20

## 2021-07-19 RX ADMIN — Medication 10 ML: at 22:00

## 2021-07-19 RX ADMIN — GABAPENTIN 1200 MG: 300 CAPSULE ORAL at 22:40

## 2021-07-19 RX ADMIN — PANCRELIPASE LIPASE, PANCRELIPASE PROTEASE, PANCRELIPASE AMYLASE 3 CAPSULE: 20000; 63000; 84000 CAPSULE, DELAYED RELEASE ORAL at 12:19

## 2021-07-19 RX ADMIN — ATORVASTATIN CALCIUM 20 MG: 20 TABLET, FILM COATED ORAL at 08:53

## 2021-07-19 RX ADMIN — ACETAMINOPHEN 650 MG: 325 TABLET ORAL at 08:51

## 2021-07-19 RX ADMIN — SODIUM CHLORIDE 75 ML/HR: 9 INJECTION, SOLUTION INTRAVENOUS at 23:08

## 2021-07-19 RX ADMIN — DULOXETINE 60 MG: 30 CAPSULE, DELAYED RELEASE ORAL at 08:53

## 2021-07-19 RX ADMIN — SODIUM CHLORIDE 100 ML/HR: 9 INJECTION, SOLUTION INTRAVENOUS at 07:33

## 2021-07-19 RX ADMIN — GABAPENTIN 1200 MG: 300 CAPSULE ORAL at 08:53

## 2021-07-19 RX ADMIN — PANTOPRAZOLE SODIUM 40 MG: 40 TABLET, DELAYED RELEASE ORAL at 08:53

## 2021-07-19 RX ADMIN — Medication 10 ML: at 18:21

## 2021-07-19 RX ADMIN — DULOXETINE 60 MG: 30 CAPSULE, DELAYED RELEASE ORAL at 18:20

## 2021-07-19 RX ADMIN — Medication 1 CAPSULE: at 08:54

## 2021-07-19 RX ADMIN — PANCRELIPASE LIPASE, PANCRELIPASE PROTEASE, PANCRELIPASE AMYLASE 3 CAPSULE: 20000; 63000; 84000 CAPSULE, DELAYED RELEASE ORAL at 18:20

## 2021-07-19 RX ADMIN — ENOXAPARIN SODIUM 30 MG: 30 INJECTION, SOLUTION INTRAVENOUS; SUBCUTANEOUS at 08:53

## 2021-07-19 NOTE — CONSULTS
Gastroenterology Consult     Referring Physician: Dr. Víctor Lisa Date: 7/19/2021     Subjective:     Chief Complaint: chronic diarrhea    History of Present Illness: Tamiko Spencer is a 63 y.o. female who is seen in consultation for chronic diarrhea. She is well known to Dr. Robert Olivares for Exocrine Pancreatic Insuficiency (EPI). She has been prescribed Bert Pep. Prior work up includes low fecal elastase, negative duodenal bx for Celiac, negative GI stool profile, negative colon bx for microscopic colitis, negative C. diff , negative enteric pathogens, and normal fecal WBCs. Recent colonoscopy revealed an ulcer in the sigmoid colon. Path was consistent with ulcerated adenoma. No colitis or ileitis on bx. WBC is normal now. It was 15 on admission, attributed to UTI. She was also febrile with Tm 101.1 and fever was also attributed to UTI. Now T is 99. On Rocephin for UTI. Has had pain across lower abd. Diarrhea is up to 10 times per day and can wake her from sleep. Not sure if floating. Not sure if worse after eating. No blood or mucous in the stool  Has lost 5 lbs. CT abd/pelvis without contrast shows normal pancreas and colon. CBD is dilated to 11mm with contracted GB. No visualized gallstones. There is a duodenal diverticulum immediately posteriorlateral to distal CBD. Mother had GB disease. No ruq or R scapular pain.        Past Medical History:   Diagnosis Date    Arthritis     RHEUMATOID AND OSTEOARTHRITIS    Breast cancer ([facility] [address] 2008    right    C. difficile diarrhea 2004    as of 10/23/14 pt denies any diarrhea or sx    Cancer ([address] 1982    uterine CA, hysterectomy, no chemo / radiation    Cancer ([address] 2008    Rt BR; lumpectomy, radiation with chemo pill x6 yrs    Fibromyalgia     GERD (gastroesophageal reflux disease)     Hypercholesteremia     Neuropathy     Osteoarthritis     Other ill-defined conditions(259.89)     GRANULOMA ANEULARE, SKIN DISORDER    Psychiatric disorder     ANXIETY    Rheumatoid arthritis ([facility] [address]     S/P radiation therapy 2008    right breast    Seasonal allergies      Past Surgical History:   Procedure Laterality Date    COLONOSCOPY N/A 5/25/2018    COLONOSCOPY performed by Jacob Harvey MD at [facility] ENDOSCOPY    COLONOSCOPY N/A 7/9/2021    COLONOSCOPY performed by Naye Llamas MD at [facility] ENDOSCOPY    HX APPENDECTOMY      HX CARPAL TUNNEL RELEASE Bilateral     HX CERVICAL DISKECTOMY  2/3/14    C4-5     HX COLONOSCOPY      HX GI      ENDOSCOPY (DILATATION/ESOPH)    HX HYSTERECTOMY  1982    partial    HX KNEE ARTHROSCOPY Left     x2    HX LUMBAR DISKECTOMY  06/2018    HX MOHS PROCEDURES Right     HX ORTHOPAEDIC  2005, 2011    CERVICAL NECKx 3    HX ORTHOPAEDIC Bilateral 1988    carpal tunnel repair, and bilater knee arthroscopy    HX ORTHOPAEDIC Left     shoulder arthroscopy    HX OTHER SURGICAL  3/2008    hematoma s/p Rt BR lumpectomy    HX TONSILLECTOMY  1963    T&A    MRI GUIDE BREAST BX LT Left     AR ABDOMEN SURGERY PROC UNLISTED  1998    COLECTOMY (1/2 REMOVED; \"WAS IN KNOTS\")    AR BREAST SURGERY PROCEDURE UNLISTED Right 2/2008    RIGHT BREAST LUMPECTOMY      Family History   Problem Relation Age of Onset    Heart Disease Mother     High Cholesterol Mother     Hypertension Mother     High Cholesterol Father     Heart Disease Father     Obesity Father     Diabetes Father     Cancer Maternal Grandmother         colon    Breast Cancer Maternal Aunt     Anesth Problems Neg Hx      Social History     Tobacco Use    Smoking status: Current Every Day Smoker     Packs/day: 0.50     Years: 45.00     Pack years: 22.50    Smokeless tobacco: Never Used   Substance Use Topics    Alcohol use: Yes     Comment: once a month      Allergies   Allergen Reactions    Aspirin Other (comments)     Blacked out. Talked with patient.  Can take other NSAIDS like Ibuprofen, Naprosyn     Current Facility-Administered Medications   Medication Dose Route Frequency    diphenoxylate-atropine (LOMOTIL) tablet 1 Tablet  1 Tablet Oral QID PRN    L.acidophilus-paracasei-S.thermophil-bifidobacter (RISAQUAD) 8 billion cell capsule  1 Capsule Oral DAILY    gabapentin (NEURONTIN) capsule 1,200 mg  1,200 mg Oral TID    zinc oxide-cod liver oil (DESITIN) 40 % paste   Topical PRN    DULoxetine (CYMBALTA) capsule 60 mg  60 mg Oral BID    pantoprazole (PROTONIX) tablet 40 mg  40 mg Oral ACB    atorvastatin (LIPITOR) tablet 20 mg  20 mg Oral DAILY    sodium chloride (NS) flush 5-40 mL  5-40 mL IntraVENous Q8H    sodium chloride (NS) flush 5-40 mL  5-40 mL IntraVENous PRN    acetaminophen (TYLENOL) tablet 650 mg  650 mg Oral Q6H PRN    Or    acetaminophen (TYLENOL) suppository 650 mg  650 mg Rectal Q6H PRN    polyethylene glycol (MIRALAX) packet 17 g  17 g Oral DAILY PRN    ondansetron (ZOFRAN ODT) tablet 4 mg  4 mg Oral Q8H PRN    Or    ondansetron (ZOFRAN) injection 4 mg  4 mg IntraVENous Q6H PRN    enoxaparin (LOVENOX) injection 30 mg  30 mg SubCUTAneous DAILY    0.9% sodium chloride infusion  100 mL/hr IntraVENous CONTINUOUS        Review of Systems:  A detailed review of systems was performed as follows:  Constitutional:  +fever  Eyes:  No ocular sensitivity to the sun, blurred vision or double vision. ENMT:  No nose or sinus problems. Respiratory: No coughing, wheezing or sob  Cardiac:  No chest pain, exertional chest pain or palpitations  Gastrointestinal:  See history of the present illness  :   See UTI  Musculoskeletal:  +low back pain, herniated disc   Endocrine:  No thyroid disease or diabetes  Psychiatric: No depression or feeling blue  Integumentary:  No skin rash or sensitivity to the sun. Neurologic:  No stroke or seizure; no numbness or tingling of the extremities.   Heme-Lymphatic:  No history of anemia, no unexplained lumps or bumps      Objective:     Physical Exam:  Visit Vitals  BP (!) 151/88   Pulse 85 Temp 99 °F (37.2 °C)   Resp 18   Ht 5' 2\" (1.575 m)   Wt 65.8 kg (145 lb)   SpO2 99%   BMI 26.52 kg/m²        Gen: 62 y/o white female in NAD  Skin:  Extremities and face reveal no rashes. HEENT: Sclerae anicteric. No abnormal pigmentation of the lips. Cardiovascular: Regular rate and rhythm. No murmurs, gallops, or rubs. Respiratory:  Comfortable breathing with no accessory muscle use. Clear breath sounds with no wheezes, rales, or rhonchi. GI:  Abdomen nondistended, soft. Normal active bowel sounds. Tenderness across lower abd, center. Tenderness in LUQ. No guarding or rebounding. No enlargement of the liver or spleen. No masses palpable. Rectal:  Deferred  Musculoskeletal:  No pitting edema of the lower legs. Neurological:  Gross memory appears intact. Patient is alert and oriented. Psychiatric:  Mood appears appropriate with judgement intact. Lymphatic:  No cervical or supraclavicular adenopathy. Lab/Data Review:  Lab Results   Component Value Date/Time    WBC 6.3 07/18/2021 02:38 AM    HGB 10.1 (L) 07/18/2021 02:38 AM    HCT 30.6 (L) 07/18/2021 02:38 AM    PLATELET 016 95/24/3271 02:38 AM    MCV 93.3 07/18/2021 02:38 AM     Lab Results   Component Value Date/Time    Sodium 144 07/18/2021 02:38 AM    Potassium 3.6 07/18/2021 02:38 AM    Chloride 116 (H) 07/18/2021 02:38 AM    CO2 22 07/18/2021 02:38 AM    Anion gap 6 07/18/2021 02:38 AM    Glucose 100 07/18/2021 02:38 AM    BUN 7 07/18/2021 02:38 AM    Creatinine 0.73 07/18/2021 02:38 AM    BUN/Creatinine ratio 10 (L) 07/18/2021 02:38 AM    GFR est AA >60 07/18/2021 02:38 AM    GFR est non-AA >60 07/18/2021 02:38 AM    Calcium 8.8 07/18/2021 02:38 AM    Bilirubin, total 0.2 07/18/2021 02:38 AM    Alk.  phosphatase 73 07/18/2021 02:38 AM    Protein, total 5.6 (L) 07/18/2021 02:38 AM    Albumin 2.0 (L) 07/18/2021 02:38 AM    Globulin 3.6 07/18/2021 02:38 AM    A-G Ratio 0.6 (L) 07/18/2021 02:38 AM    ALT (SGPT) 32 07/18/2021 02:38 AM    AST (SGOT) 27 07/18/2021 02:38 AM     CT Results (most recent):  Results from [place] encounter on 07/15/21    CT ABD PELV WO CONT    Narrative  EXAM: CT ABD PELV WO CONT    INDICATION: abd pain, fever, diarrhea, UTI, hx kidney stones. COMPARISON: 1/21/2011    CONTRAST:  None. TECHNIQUE:  Thin axial images were obtained through the abdomen and pelvis. Coronal and  sagittal reformats were generated. Oral contrast was administered. CT dose  reduction was achieved through use of a standardized protocol tailored for this  examination and automatic exposure control for dose modulation. The absence of intravenous contrast material reduces the sensitivity for  evaluation of the vasculature and solid organs. FINDINGS:  LOWER THORAX: Mild left lower lobe atelectasis. LIVER: No mass. BILIARY TREE: Gallbladder is contracted. Common bile duct is dilated, measuring  11 mm in diameter. SPLEEN: within normal limits. PANCREAS: No focal abnormality. ADRENALS: Unremarkable. KIDNEYS/URETERS: 12 mm nonobstructing left renal calculus. No hydronephrosis. No  ureteral calculus. STOMACH: Unremarkable. SMALL BOWEL: No dilatation or wall thickening. Small duodenal diverticulum  (immediately posterolateral to the distal common bile duct). COLON: No dilatation or wall thickening. APPENDIX: Surgically absent. PERITONEUM: No ascites or pneumoperitoneum. RETROPERITONEUM: No lymphadenopathy or aortic aneurysm. REPRODUCTIVE ORGANS: Status post hysterectomy. URINARY BLADDER: No mass or calculus. BONES: No destructive bone lesion. Status post L4-5 posterior fusion with  pedicle screws and rods. ABDOMINAL WALL: No mass or hernia. ADDITIONAL COMMENTS: N/A    Impression  1. Dilated common bile duct, with no visualized choledocholithiasis. 2. 5 mm nonobstructing left renal calculus. 3. Otherwise, no evidence of acute abdominal or pelvic process.         Assessment/Plan:     Active Problems:    UTI (urinary tract infection) (7/15/2021)      Acute metabolic encephalopathy (0/45/8340)    Chronic diarrhea, related to EPI    Dilated CBD     Start ZenPep for EPI and increase dose as needed to achieve formed stools. Send Celiac panel. Check Abd U/S given dilated CBD (normal LFTs makes choledocholithiasis less likely).     LAVELL Caldera  07/19/21  11:12 AM

## 2021-07-19 NOTE — PROGRESS NOTES
Problem: Self Care Deficits Care Plan (Adult)  Goal: *Acute Goals and Plan of Care (Insert Text)  Description: FUNCTIONAL STATUS PRIOR TO ADMISSION: Patient was independent and active without use of DME.      HOME SUPPORT: The patient lived with  but did not require assist.  Benjamen Cm increased A lately, however still completing ADLs on own       Occupational Therapy Goals  Initiated 7/18/2021  1. Patient will perform bathing with modified independence within 7 day(s). 2.  Patient will perform lower body dressing with modified independence and AE PRN within 7 day(s). 3.  Patient will perform standing ADLs for 5 minutes without LOB with modified independence within 7 day(s). 4.  Patient will perform toilet transfers with modified independence within 7 day(s). 5.  Patient will perform all aspects of toileting with modified independence within 7 day(s). 6.  Patient will participate in upper extremity therapeutic exercise/activities with modified independence for 10 minutes within 7 day(s). 7.  Patient will utilize energy conservation techniques during functional activities with verbal cues within 7 day(s). Outcome: Progressing Towards Goal   OCCUPATIONAL THERAPY TREATMENT  Patient: Noel Rosales (23 y.o. female)  Date: 7/19/2021  Diagnosis: Acute metabolic encephalopathy [D38.23]  UTI (urinary tract infection) [N39.0] <principal problem not specified>       Precautions: Fall  Chart, occupational therapy assessment, plan of care, and goals were reviewed. ASSESSMENT  Patient continues with skilled OT services and is progressing towards goals. Pt received supine in bed, agreeable to participate. She transferred supine>sit with supervision, good sitting balance. Using RW she ambulated to bathroom with SBA to perform bathing and dressing ADLs with setup-CGA. Pt then stood at sink for up to 10 minutes to perform grooming ADLs with SBA.  Required a few short rest breaks during ADL tasks, fatigued after prolonged standing and required seated rest break. Returned to bed at end of session with needs met and alarm set. Pt puts forth good effort during session, is below her independent functional baseline at this time. Recommend HH therapy at discharge. Current Level of Function Impacting Discharge (ADLs): SBA-CGA transfers, independent-CGA ADLs    Other factors to consider for discharge: below PLOF         PLAN :  Patient continues to benefit from skilled intervention to address the above impairments. Continue treatment per established plan of care to address goals. Recommendation for discharge: (in order for the patient to meet his/her long term goals)  Occupational therapy at least 2 days/week in the home     This discharge recommendation:  Has been made in collaboration with the attending provider and/or case management    IF patient discharges home will need the following DME: patient owns DME required for discharge       SUBJECTIVE:   Patient stated That feels a lot better (after bathing).     OBJECTIVE DATA SUMMARY:   Cognitive/Behavioral Status:  Neurologic State: Alert  Orientation Level: Oriented X4  Cognition: Follows commands  Perception: Appears intact  Perseveration: No perseveration noted  Safety/Judgement: Awareness of environment; Fall prevention    Functional Mobility and Transfers for ADLs:  Bed Mobility:  Rolling: Supervision  Supine to Sit: Supervision  Sit to Supine: Supervision  Scooting: Supervision    Transfers:  Sit to Stand: Stand-by assistance  Functional Transfers  Toilet Transfer : Stand-by assistance; Adaptive equipment; Additional time       Balance:  Sitting: Intact  Standing: Impaired  Standing - Static: Good  Standing - Dynamic : Good    ADL Intervention:       Grooming  Position Performed: Standing  Washing Hands: Supervision  Brushing Teeth: Stand-by assistance    Upper Body Bathing  Bathing Assistance: Set-up  Position Performed:  (seated on toilet)  Cues: Verbal cues provided    Lower Body Bathing  Perineal  : Contact guard assistance  Position Performed:  (standing)  Lower Body : Set-up  Position Performed:  (seated on toilet)       Lower Body Dressing Assistance  Underpants: Stand-by assistance  Socks: Stand-by assistance  Leg Crossed Method Used: Yes  Cues: Verbal cues provided         Cognitive Retraining  Safety/Judgement: Awareness of environment; Fall prevention    Pain:  Pt did not c/o pain    Activity Tolerance:   Good    After treatment patient left in no apparent distress:   Supine in bed, Call bell within reach, and Side rails x 3    COMMUNICATION/COLLABORATION:   The patients plan of care was discussed with: Physical therapist and Registered nurse.      Raymundo White OT  Time Calculation: 39 mins

## 2021-07-19 NOTE — PROGRESS NOTES
Problem: Mobility Impaired (Adult and Pediatric)  Goal: *Acute Goals and Plan of Care (Insert Text)  Description: FUNCTIONAL STATUS PRIOR TO ADMISSION: Pt is mod I with rollator at baseline; indep with ADLs    HOME SUPPORT PRIOR TO ADMISSION: The patient lived with  but did not require assist.    Physical Therapy Goals  Initiated 7/16/2021  1. Patient will move from supine to sit and sit to supine , scoot up and down, and roll side to side in bed with modified independence within 7 day(s). 2.  Patient will transfer from bed to chair and chair to bed with modified independence using the least restrictive device within 7 day(s). 3.  Patient will perform sit to stand with modified independence within 7 day(s). 4.  Patient will ambulate with modified independence for 150 feet with the least restrictive device within 7 day(s). 5.  Patient will ascend/descend 4 stairs with handrail(s) with supervision/set-up within 7 day(s). Outcome: Progressing Towards Goal   PHYSICAL THERAPY TREATMENT  Patient: Juliet Drummond (73 y.o. female)  Date: 7/19/2021  Diagnosis: Acute metabolic encephalopathy [I36.66]  UTI (urinary tract infection) [N39.0] <principal problem not specified>       Precautions: Fall  Chart, physical therapy assessment, plan of care and goals were reviewed. ASSESSMENT  Patient continues with skilled PT services and is progressing towards goals. Patient overall needing supervision for bed mobility and SBA for transfers. Amb approx 150 feet with RW and CGA-SBA with some mild balance instability but no overt LOB. Also standing prolonged time in bathroom for grooming with OT with CGA-SBA. She does fatigue quickly and needs intermittent rest breaks. Recommend  PT follow up at time for DC. Other factors to consider for discharge: at risk for falls, below baseline         PLAN :  Patient continues to benefit from skilled intervention to address the above impairments.   Continue treatment per established plan of care. to address goals. Recommendation for discharge: (in order for the patient to meet his/her long term goals)  Physical therapy at least 2 days/week in the home       IF patient discharges home will need the following DME: patient owns DME required for discharge       SUBJECTIVE:   Patient stated I'm doing better but my stomach is still hurting.     OBJECTIVE DATA SUMMARY:   Critical Behavior:  Neurologic State: Alert, Appropriate for age  Orientation Level: Oriented X4  Cognition: Appropriate decision making, Follows commands  Safety/Judgement: Awareness of environment, Fall prevention  Functional Mobility Training:  Bed Mobility:  Rolling: Supervision  Supine to Sit: Supervision  Sit to Supine: Supervision  Scooting: Supervision        Transfers:  Sit to Stand: Stand-by assistance  Stand to Sit: Stand-by assistance                             Balance:  Sitting: Intact  Standing: Impaired  Standing - Static: Good  Standing - Dynamic : Good  Ambulation/Gait Training:  Distance (ft): 150 Feet (ft)  Assistive Device: Gait belt;Walker, rolling  Ambulation - Level of Assistance: Contact guard assistance        Gait Abnormalities: Decreased step clearance        Base of Support: Widened     Speed/Lesvia: Pace decreased (<100 feet/min); Slow  Step Length: Left shortened;Right shortened       Pain Rating:  No c/o pain    Activity Tolerance:   Fair and requires rest breaks    After treatment patient left in no apparent distress:   Sitting in chair, Call bell within reach, and Caregiver / family present    COMMUNICATION/COLLABORATION:   The patients plan of care was discussed with: Physical therapist, Occupational therapist, and Registered nurse.      Akil Ulloa, PT, DPT   Time Calculation: 39 mins

## 2021-07-19 NOTE — ADT AUTH CERT NOTES
Comments  Comment     Last edited by  on  at    Patient Demographics    Patient Name   Carlos Enrique Waldrop   [id number] Legal Sex   Female    1955 Address   [address]   [address] [address] Phone   [phone] (Home)   [phone] (Work)   [phone] (Mobile) *Preferred*   Patient Demographics    Patient Name   Carlos Enrique Waldrop   [id number] Legal Sex   Female    1955 Address   [address]   [address] [address] Phone   [phone] (Home)   [phone] (Work)   [phone] (Mobile) *Preferred*   CSN:   [id number]   Admit Date: Admit Time Room Bed   Jul 15, 2021  2:44 PM 3264 [[id number]] 01 [[id number]]   Attending Providers    Provider Pager From To   Dorita Alejandro MD  07/15/21 07/15/21   Gem Gonzalez MD  07/15/21 07/16/21   Lorraine Lisa MD  21   Gem Gonzalez MD  21   Lorraine Lisa MD  21   Danielle Finney MD  21    Emergency Contact(s)    Name Relation Home Work Mobile   Claudio Joseph [phone]   Utilization Reviews       Urinary Tract Infection (UTI) - Care Day 3 (2021) by Shannen Ogden RN       Review Entered Review Status   2021 08:03 Completed      Criteria Review      Care Day: 3 Care Date: 2021 Level of Care: Telemetry    Guideline Day 2    Level Of Care    (X) Floor    2021 08:03:57 EDT by Shannen Soto    Clinical Status    (X) * Hypotension absent    2021 08:03:57 EDT by Abi Sheppard VS:  101.1 °F (38.4 °C) 117 153/83 18 96 % Room air    (X) * Mental status at baseline    2021 08:03:57 EDT by Alejandra Frank    ( ) * Afebrile or fever improved    2021 08:03:57 EDT by Abi Sheppard 101.1F    (X) * Vomiting absent or improved    2021 08:03:57 EDT by Abi Sheppard absent    Activity    (X) * Ambulatory    2021 08:03:57 EDT by Abi Sheppard Activity as tolerated w/ assist ordered    Routes    (X) IV fluids    7/19/2021 08:03:57 EDT by Isiaah Galvan      NS 100mL/hr IV Cont    (X) Advance diet as tolerated    7/19/2021 08:03:57 EDT by Isaiah Galvan      Adult Diet Reg Ordered    Interventions    (X) * Reversible urinary system abnormality (eg, obstruction, abscess) absent, addressed, or to be treated at next level of care    (X) WBC    7/19/2021 08:03:57 EDT by Isaiah Galvan      WDLs    (X) Renal function tests    7/19/2021 08:03:57 EDT by Isaiah WERNERLs    Medications    (X) Antibiotics    7/19/2021 08:03:57 EDT by Isaiah Galvan      Rocephin 1g IV q24h    (X) Possible analgesics    7/19/2021 08:03:57 EDT by Isaiah Galvan      Tylenol 650mg PO q6h PRN (x2)    * Milestone   Additional Notes   Day 3: 7/17      IM-       Chief Complaint / Reason for Physician Visit   Follow-up sepsis, UTI, back pain, JESENIA   Tolerating diet. Still complaining of diarrhea, several bouts of watery stools per day      PHYSICAL EXAM:   General:          WD, WN. Alert, cooperative, no acute distress     EENT:              EOMI. Anicteric sclerae. MMM   Resp:               CTA bilaterally, no wheezing or rales.  No accessory muscle use   CV:                  Regular  rhythm,  No edema   GI:                   Soft, Non distended, Non tender.  +Bowel sounds   Neurologic:      Alert and oriented X 3, normal speech,    Psych:             Good insight. Not anxious nor agitated   Skin:                No rashes.  No jaundice      Labs:    CO2 17 CA 8.1 Protein,t. 5.6 ALB 1.9 HCG 10.1 HCT 30.7      Meds:   Lipitor 20mg PO qd Cymbalta 60mg PO BID Lovenox 30mg SC QD Neurontin 1,200mg PO TID Protonix 40mg PO QD          IM-   Assessment / Plan:   Sepsis due to urinary tract infection   Persistent fever   -Urinalysis with pyuria and bacteriuria. -Urine culture with gram-negative rods. -Blood cultures NGTD   -Continue Rocephin, still having fevers.  May need to expand coverage. Will repeat CBC and procalcitonin       Chronic diarrhea x several weeks   -recent colonoscopy 7/09/21 by Dr Shelbie Ramos   Findings:    1. Yara Cross advanced to the cecum and terminal ileum. 2. Mario Brooks was a discrete ulcer seen in the sigmoid colon s/p Bx.  R/O NSAID vs. Ischemic vs. IBD   3.  No polyps.  Normal remaining mucosa in the colon and TI.  S/P Bx of the TI   4.  Grade 2 internal hemorrhoids.       -C. difficile negative   -Send stool for fecal leukocytes and enteric bacterial panel   -imodium prn   -will ask Dr Shelbie Ramos to see on Monday       Bilateral lower extremity numbness   Unsteady gait   History of degenerative disease of the spine   -MRI of the brain IMPRESSION   1. No acute infarct. 2. Signal abnormality in the vidya which has progressed since 2013. Chronic left   basal ganglia lacunar infarct. Findings consistent with chronic small vessel   ischemic change.   -She had a recent MRI of the spine which shows evidence of severe stenosis   -Orthopedic input appreciated. \"Pt is a candidate for revision decompression and fusion L4-S1 as a planned/shceduled out-pt procedure. Recommend medical treatment and optimization, rehab/PT/OT as necessary. Pt will need to be other wise well and functional(able to mobilize and work with therapy services) before considering ortho spine intervention. \"       -PT OT eval and treat. Plan for home health on discharge       JESENIA, resolved   -Due to prerenal factors/volume depletion.   -Taper IV fluids       GERD   Neuropathy   Dyslipidemia   Continue PPI, Cymbalta, gabapentin and Lipitor           DVT Prophylaxis: Lovenox   GI Prophylaxis: not indicated   Baseline: From home, with gradual decline in mobility since last few months due to weakness and numbness         RN NOTE:   Shift summary and any significant changes:      Pt spiked fever x2 overnight. Given PO tylenol with relief noted each time.  High MEWS, provider notified.               Urinary Tract Infection (UTI) - Care Day 2 (7/16/2021) by Eulas Apley, RN       Review Entered Review Status   7/16/2021 15:28 Completed      Criteria Review      Care Day: 2 Care Date: 7/16/2021 Level of Care: Telemetry    Guideline Day 2    Level Of Care    (X) Floor    7/16/2021 15:28:26 EDT by Vladimir Ellington Fl    Clinical Status    (X) * Hypotension absent    7/16/2021 15:28:26 EDT by Eulas Apley      Hypotension absent    (X) * Mental status at baseline    7/16/2021 15:28:26 EDT by Olamide Johns    ( ) * Afebrile or fever improved    7/16/2021 15:28:26 EDT by Natalia Parada 102.3F    (X) * Vomiting absent or improved    7/16/2021 15:28:26 EDT by Natalia Parada absent    Activity    ( ) * Ambulatory    7/16/2021 15:28:26 EDT by Natalia Parada Observed in bed    Routes    (X) IV fluids    7/16/2021 15:28:26 EDT by Eulas Apley      NS 100mL/hr IV Cont  NS 1,000mL Bolus IV Once    (X) Advance diet as tolerated    7/16/2021 15:28:26 EDT by Natalia Parada Adult Diet reg    Interventions    (X) * Reversible urinary system abnormality (eg, obstruction, abscess) absent, addressed, or to be treated at next level of care    7/16/2021 15:28:26 EDT by Natalia Parada addressed    (X) WBC    7/16/2021 15:28:26 EDT by Eulas Apley      WBC 10.0    (X) Renal function tests    7/16/2021 15:28:26 EDT by Eulas Apley      CREA 1.21    Medications    (X) Possible analgesics    7/16/2021 15:28:26 EDT by Eulas Apley      Tylenol 650mg PO q6h PRN (x2)    * Milestone   Additional Notes   Day 2: 7/16      IM-   Assessment / Plan:   Sepsis due to urinary tract infection   UA positive for UTI C   Urine culture sent. FU check blood cultures.     on ceftriaxone. Patient also having diarrhea over the last couple of days and C. difficile sent we will follow up report.    Bilateral lower extremity numbness   Unsteady gait   History of degenerative disease of the spine   MRI of the brain   She had a recent MRI of the spine which shows evidence of severe stenosis   She reports symptoms have been going on for at least few months.     Ortho on board appreciated help    Acute kidney injury likely prerenal   Improving with IVF hydration    Follow up CMP    GERD   Neuropathy   Dyslipidemia   Continue PPI, Cymbalta, gabapentin and Lipitor   Code Status: Full code   Surrogate Decision Maker:  David   DVT Prophylaxis: Lovenox   GI Prophylaxis: not indicated   Baseline: From home, with gradual decline in mobility since last few months due to weakness and numbness      ACNP Note-   Received message from patient's nurse stating / informing that: Pt has had two very loose bowel movements so far, would you like to send any sample other than the occult?                       Discussion / orders:       c-diff panel ordered      Pt hgb dropped from 11.9 12 hrs ago to 9.8 now               Discussion / orders:       · Likely dilutional.  Patient has received 3 L normal saline since admission   · We will recheck hemoglobin and hematocrit in 6 hours   · Stool for occult blood testing      Received message from patient's nurse stating / informing that   Prior to giving Tylenol for temp of 102.3, Pt heart rate got up to about 135. Pt heart rate now average of 122 and last temp 99. 3.       Vitals:    102.3 °F (39.1 °C) 116 135/66  24 98 % Room air     98.4 °F (36.9 °C) (!) 104 18 (!) 101/58 94 %                          (!) 114 18 (!) 107/54 93 %   99.8 °F (37.7 °C) (!) 115 20     109/60 96 %   99.3 °F (37.4 °C) (!) 122 20     123/69 94 %      Labs:   HGB 9.8, Repeat HGB 10.8 HCT 29.4, Repeat HCT 33.7  CO2 18 Protein, total 5.4 ALB 2.1      Meds:    Lipitor 20mg PO QD Cymbalta 60mg PO BID Lovenox 20mg SC QD Neurontin 600mg PO BID Protonix 40mg PO QD      ORTHO CONSULT-   Ortho:       Lumbar DDD/DJD,stenosis w/ radiculopathy        Pt seen in the office yesterday 7/15   MRI L-spine 7/11 reviewed with pt and her        Pt is a candidate for revision decompression and fusion L4-S1 as a planned/shceduled out-pt procedure   Recommend medical treatment and optimization, rehab/PT/OT as necessary    Pt will need to be other wise well and functional(able to mobilize and work with therapy services) before considering ortho spine intervention.

## 2021-07-19 NOTE — PROGRESS NOTES
Hospitalist Progress Note    NAME: Jovani Rockwell   :  1955   MRN:  [id number]       Assessment / Plan:  Sepsis due to urinary tract infection  -Urinalysis with pyuria and bacteriuria   -Urine culture Proteus  -Blood cultures NGTD  -leukocytosis resolved. Procalcitonin 0.17. Completed 3 day course of rocephin     Dispo:  -home with [facility] tomorrow, , once GI eval complete and diarrhea better with enzymes. Chronic diarrhea due to exocrine pancreatic insufficiency (EPI)  Dilated CBD on CT  -CT abdomen: IMPRESSION  1. Dilated common bile duct, with no visualized choledocholithiasis. 2. 5 mm nonobstructing left renal calculus. 3. Otherwise, no evidence of acute abdominal or pelvic process. -recent colonoscopy 21 by Dr Sandra Aleman  Findings:   1. Scope advanced to the cecum and terminal ileum. 2.  There was a discrete ulcer seen in the sigmoid colon s/p Bx.  R/O NSAID vs. Ischemic vs. IBD  3. No polyps. Normal remaining mucosa in the colon and TI. S/P Bx of the TI  4. Grade 2 internal hemorrhoids.    -C. difficile negative; enteric bacteria neg, fecal leukocytes 0-4  -appreciate GI input. Patient was not aware of having EPI. Starting Bert Pep today. Patient reports 3-4 loose stools this morning, hopefully better by tomorrow. Will continue IVF but decrease the rate  -ABD US pending, to assess bile ducts. No LFT elevation    Bilateral lower extremity numbness  Unsteady gait  History of degenerative disease of the spine  -MRI of the brain IMPRESSION  1. No acute infarct. 2. Signal abnormality in the vidya which has progressed since 2013. Chronic left  basal ganglia lacunar infarct. Findings consistent with chronic small vessel  ischemic change.  -She had a recent MRI of the spine which shows evidence of severe stenosis  -Orthopedic input appreciated. \"Pt is a candidate for revision decompression and fusion L4-S1 as a planned/shceduled out-pt procedure.  Recommend medical treatment and optimization, rehab/PT/OT as necessary. Pt will need to be other wise well and functional(able to mobilize and work with therapy services) before considering ortho spine intervention. \"     -PT OT eval and treat. Plan for home health on discharge    JESENIA, resolved  -Due to prerenal factors/volume depletion.  -Taper IV fluids    GERD  Neuropathy  Dyslipidemia  Continue PPI, Cymbalta, gabapentin and Lipitor      Code Status: Full code  Surrogate Decision Maker:  Zigmund Police  DVT Prophylaxis: Lovenox  GI Prophylaxis: not indicated  Baseline: From home, with gradual decline in mobility since last few months due to weakness and numbness     Subjective:     Chief Complaint / Reason for Physician Visit  Follow-up sepsis, UTI, back pain, JESENIA    Review of Systems:  Symptom Y/N Comments  Symptom Y/N Comments   Fever/Chills n   Chest Pain n    Poor Appetite    Edema     Cough    Abdominal Pain y    Sputum    Joint Pain     SOB/MOORE n   Pruritis/Rash     Nausea/vomit    Tolerating PT/OT     Diarrhea y   Tolerating Diet     Constipation    Other       Could NOT obtain due to:      Objective:     VITALS:   Last 24hrs VS reviewed since prior progress note. Most recent are:  Patient Vitals for the past 24 hrs:   Temp Pulse Resp BP SpO2   07/19/21 1229 98.2 °F (36.8 °C) 81 18 (!) 142/78 99 %   07/19/21 0821 99 °F (37.2 °C) 85 18 (!) 151/88 99 %   07/19/21 0258 99.3 °F (37.4 °C) 94 17 (!) 148/79 98 %   07/18/21 2317 99.1 °F (37.3 °C) 98 18 139/75 98 %   07/18/21 1943 98.9 °F (37.2 °C) 86 17 (!) 151/86 98 %   07/18/21 1600 98.1 °F (36.7 °C) 88 18 (!) 142/74 100 %       Intake/Output Summary (Last 24 hours) at 7/19/2021 1338  Last data filed at 7/19/2021 0821  Gross per 24 hour   Intake 1240 ml   Output    Net 1240 ml        PHYSICAL EXAM:  General: WD, WN. Alert, cooperative, no acute distress    EENT:  EOMI. Anicteric sclerae. MMM  Resp:  CTA bilaterally, no wheezing or rales.   No accessory muscle use  CV:  Regular  rhythm,  No edema  GI:  Soft, Non distended, not significantly tender.  +Bowel sounds  Neurologic:  Alert and oriented X 3, normal speech,   Psych:   Good insight. Not anxious nor agitated  Skin:  No rashes. No jaundice    Reviewed most current lab test results and cultures  YES  Reviewed most current radiology test results   YES  Review and summation of old records today    NO  Reviewed patient's current orders and MAR    YES  PMH/SH reviewed - no change compared to H&P  ________________________________________________________________________  Care Plan discussed with:    Comments   Patient y    Family      RN y    Care Manager     Consultant  y                      Multidiciplinary team rounds were held today with , nursing, pharmacist and clinical coordinator. Patient's plan of care was discussed; medications were reviewed and discharge planning was addressed. ________________________________________________________________________  Total NON critical care TIME:  25   Minutes    Total CRITICAL CARE TIME Spent:   Minutes non procedure based      Comments   >50% of visit spent in counseling and coordination of care y    ________________________________________________________________________  Brenda Delgado MD     Procedures: see electronic medical records for all procedures/Xrays and details which were not copied into this note but were reviewed prior to creation of Plan. LABS:  I reviewed today's most current labs and imaging studies.   Pertinent labs include:  Recent Labs     07/18/21 0238 07/17/21  0109   WBC 6.3 8.6   HGB 10.1* 10.1*   HCT 30.6* 30.7*    173     Recent Labs     07/18/21  0238 07/17/21  0109    136   K 3.6 4.1   * 114*   CO2 22 17*    93   BUN 7 13   CREA 0.73 0.94   CA 8.8 8.1*   ALB 2.0* 1.9*   TBILI 0.2 0.2   ALT 32 26       Signed: Brenda Delgado MD

## 2021-07-19 NOTE — PROGRESS NOTES
End of Shift Note    Bedside shift change report given to Aury Coleman (oncoming nurse) by Sylvia Del Cid (offgoing nurse). Report included the following information SBAR, Kardex, Intake/Output, MAR and Recent Results    Shift worked:  [id number]     Shift summary and any significant changes:     No significant changes. No watery stools overnight. Concerns for physician to address:  none     Zone phone for oncoming shift:          Activity:  Activity Level: Up with Assistance  Number times ambulated in hallways past shift: 0  Number of times OOB to chair past shift: 0    Cardiac:   Cardiac Monitoring: Yes      Cardiac Rhythm: Sinus Rhythm    Access:   Current line(s): PIV     Genitourinary:   Urinary status: voiding    Respiratory:   O2 Device: None (Room air)  Chronic home O2 use?: NO  Incentive spirometer at bedside: NO     GI:  Last Bowel Movement Date: 07/17/21  Current diet:  ADULT DIET Regular  ADULT ORAL NUTRITION SUPPLEMENT Lunch, Dinner; Standard High Calorie/High Protein  Passing flatus: YES  Tolerating current diet: YES       Pain Management:   Patient states pain is manageable on current regimen: N/A    Skin:  Bryant Score: 18  Interventions: float heels and increase time out of bed    Patient Safety:  Fall Score:  Total Score: 4  Interventions: gripper socks and pt to call before getting OOB  High Fall Risk: Yes    Length of Stay:  Expected LOS: 3d 12h  Actual LOS: [address]

## 2021-07-20 VITALS
BODY MASS INDEX: 26.68 KG/M2 | RESPIRATION RATE: 16 BRPM | DIASTOLIC BLOOD PRESSURE: 78 MMHG | TEMPERATURE: 97.7 F | HEART RATE: 75 BPM | WEIGHT: 145 LBS | SYSTOLIC BLOOD PRESSURE: 133 MMHG | HEIGHT: 62 IN | OXYGEN SATURATION: 100 %

## 2021-07-20 LAB
ALBUMIN SERPL-MCNC: 2.2 G/DL (ref 3.5–5)
ALBUMIN/GLOB SERPL: 0.6 {RATIO} (ref 1.1–2.2)
ALP SERPL-CCNC: 76 U/L (ref 45–117)
ALT SERPL-CCNC: 31 U/L (ref 12–78)
ANION GAP SERPL CALC-SCNC: 7 MMOL/L (ref 5–15)
AST SERPL-CCNC: 20 U/L (ref 15–37)
BACTERIA SPEC CULT: NORMAL
BILIRUB SERPL-MCNC: 0.3 MG/DL (ref 0.2–1)
BUN SERPL-MCNC: 9 MG/DL (ref 6–20)
BUN/CREAT SERPL: 14 (ref 12–20)
CALCIUM SERPL-MCNC: 8.9 MG/DL (ref 8.5–10.1)
CHLORIDE SERPL-SCNC: 107 MMOL/L (ref 97–108)
CO2 SERPL-SCNC: 25 MMOL/L (ref 21–32)
CREAT SERPL-MCNC: 0.63 MG/DL (ref 0.55–1.02)
GLOBULIN SER CALC-MCNC: 3.8 G/DL (ref 2–4)
GLUCOSE SERPL-MCNC: 131 MG/DL (ref 65–100)
MAGNESIUM SERPL-MCNC: 1.6 MG/DL (ref 1.6–2.4)
POTASSIUM SERPL-SCNC: 3.5 MMOL/L (ref 3.5–5.1)
PROT SERPL-MCNC: 6 G/DL (ref 6.4–8.2)
SERVICE CMNT-IMP: NORMAL
SODIUM SERPL-SCNC: 139 MMOL/L (ref 136–145)

## 2021-07-20 PROCEDURE — 97116 GAIT TRAINING THERAPY: CPT

## 2021-07-20 PROCEDURE — 74011250636 HC RX REV CODE- 250/636: Performed by: INTERNAL MEDICINE

## 2021-07-20 PROCEDURE — 83735 ASSAY OF MAGNESIUM: CPT

## 2021-07-20 PROCEDURE — 74011250637 HC RX REV CODE- 250/637: Performed by: INTERNAL MEDICINE

## 2021-07-20 PROCEDURE — 80053 COMPREHEN METABOLIC PANEL: CPT

## 2021-07-20 PROCEDURE — 74011250637 HC RX REV CODE- 250/637: Performed by: PHYSICIAN ASSISTANT

## 2021-07-20 PROCEDURE — 97530 THERAPEUTIC ACTIVITIES: CPT

## 2021-07-20 PROCEDURE — 36415 COLL VENOUS BLD VENIPUNCTURE: CPT

## 2021-07-20 RX ORDER — DIPHENOXYLATE HYDROCHLORIDE AND ATROPINE SULFATE 2.5; .025 MG/1; MG/1
1 TABLET ORAL
Qty: 20 TABLET | Refills: 0 | Status: SHIPPED | OUTPATIENT
Start: 2021-07-20

## 2021-07-20 RX ADMIN — ACETAMINOPHEN 650 MG: 325 TABLET ORAL at 08:59

## 2021-07-20 RX ADMIN — PANCRELIPASE LIPASE, PANCRELIPASE PROTEASE, PANCRELIPASE AMYLASE 3 CAPSULE: 20000; 63000; 84000 CAPSULE, DELAYED RELEASE ORAL at 13:25

## 2021-07-20 RX ADMIN — PANCRELIPASE LIPASE, PANCRELIPASE PROTEASE, PANCRELIPASE AMYLASE 3 CAPSULE: 20000; 63000; 84000 CAPSULE, DELAYED RELEASE ORAL at 08:59

## 2021-07-20 RX ADMIN — PANTOPRAZOLE SODIUM 40 MG: 40 TABLET, DELAYED RELEASE ORAL at 08:59

## 2021-07-20 RX ADMIN — ATORVASTATIN CALCIUM 20 MG: 20 TABLET, FILM COATED ORAL at 08:59

## 2021-07-20 RX ADMIN — ENOXAPARIN SODIUM 30 MG: 30 INJECTION, SOLUTION INTRAVENOUS; SUBCUTANEOUS at 08:59

## 2021-07-20 RX ADMIN — DULOXETINE 60 MG: 30 CAPSULE, DELAYED RELEASE ORAL at 08:59

## 2021-07-20 RX ADMIN — GABAPENTIN 1200 MG: 300 CAPSULE ORAL at 08:59

## 2021-07-20 RX ADMIN — Medication 10 ML: at 06:00

## 2021-07-20 RX ADMIN — Medication 1 CAPSULE: at 08:59

## 2021-07-20 NOTE — PROGRESS NOTES
Transition of Care Plan:     RUR: 14%         Disposition: TBD - [facility]   Follow up appointments: PCP  DME needed: Pt has a cane, wc, and RW  Transportation at Discharge: Pt's   [employer] or means to access home: Pt's  has access       IM Medicare letter: Received on 7/20/21  Caregiver Contact: Spouse, Sergio Manzano ([phone])  Discharge Caregiver contacted prior to discharge? CM spoke with pt's cg at bedside    1:47 PM  CM acknowledged d/c orders. Pt will d/c home home [facility]. Pt's  will transport pt home. Medicare pt has received, reviewed, and signed 2nd  letter informing them of their right to appeal the discharge. Signed copy has been placed on pt bedside chart. No further needs identified at this time. Patient is ready to d/c on a CM standpoint. RN aware. Care Management Interventions  PCP Verified by CM: Yes  Last Visit to PCP: 06/28/21  Mode of Transport at Discharge: Other (see comment) (Pt's )  Transition of Care Consult (CM Consult): [address]: No  Reason Outside [place]: Out of service area  Discharge Durable Medical Equipment: No (Pt has a cane, rw, and wc)  Physical Therapy Consult: Yes  Occupational Therapy Consult: Yes  Speech Therapy Consult: No  Current Support Network: Lives with Spouse  Confirm Follow Up Transport: Family  Discharge Location  Discharge Placement: Home with home health ([facility])    1:21 PM  At [facility] and [address] [place] [address] have both declined. CM sent referral to [facility]    11:58 AM  CM met pt at bedside. Pt is in agreement with d/c home with [facility]. CM sent referral to AT [address] [place] [address] via [employer].     EVA Olguin  Care Manager [facility]  [phone]

## 2021-07-20 NOTE — PROGRESS NOTES
Bedside and Verbal shift change report given to Felicia Smith (oncoming nurse) by David Leslie (offgoing nurse). Report included the following information SBAR, Kardex, ED Summary, Procedure Summary, MAR and Recent Results.

## 2021-07-20 NOTE — PROGRESS NOTES
End of Shift Note    Bedside shift change report given to Sarah Sheppard, RN oncoming nurse) by Hilda Ceron (offgoing nurse). Report included the following information SBAR, Kardex, Intake/Output, MAR and Recent Results    Shift worked:  [id number]     Shift summary and any significant changes:     Cont to have loose BM's. This shift she had ~ five. Concerns for physician to address:       Zone phone for oncoming shift:          Activity:  Activity Level: Up with Assistance  Number times ambulated in hallways past shift: 0  Number of times OOB to chair past shift: 0    Cardiac:   Cardiac Monitoring: Yes      Cardiac Rhythm: Sinus Tach    Access:   Current line(s): PIV     Genitourinary:   Urinary status: voiding    Respiratory:   O2 Device: None (Room air)  Chronic home O2 use?: NO  Incentive spirometer at bedside: YES     GI:  Last Bowel Movement Date: 07/19/21  Current diet:  ADULT DIET Regular  ADULT ORAL NUTRITION SUPPLEMENT Lunch, Dinner; Standard High Calorie/High Protein  Passing flatus: Tolerating current diet: YES       Pain Management:   Patient states pain is manageable on current regimen: YES    Skin:  Bryant Score: 18  Interventions: float heels, increase time out of bed, PT/OT consult and nutritional support     Patient Safety:  Fall Score:  Total Score: 4  Interventions: gripper socks, pt to call before getting OOB and stay with me (per policy)  High Fall Risk: Yes    Length of Stay:  Expected LOS: 3d 12h  Actual LOS: [address] [facility]

## 2021-07-20 NOTE — PROGRESS NOTES
Problem: Mobility Impaired (Adult and Pediatric)  Goal: *Acute Goals and Plan of Care (Insert Text)  Description: FUNCTIONAL STATUS PRIOR TO ADMISSION: Pt is mod I with rollator at baseline; indep with ADLs    HOME SUPPORT PRIOR TO ADMISSION: The patient lived with  but did not require assist.    Physical Therapy Goals  Initiated 7/16/2021  1. Patient will move from supine to sit and sit to supine , scoot up and down, and roll side to side in bed with modified independence within 7 day(s). 2.  Patient will transfer from bed to chair and chair to bed with modified independence using the least restrictive device within 7 day(s). 3.  Patient will perform sit to stand with modified independence within 7 day(s). 4.  Patient will ambulate with modified independence for 150 feet with the least restrictive device within 7 day(s). 5.  Patient will ascend/descend 4 stairs with handrail(s) with supervision/set-up within 7 day(s). Outcome: Progressing Towards Goal    PHYSICAL THERAPY TREATMENT  Patient: Halina Martin (67 y.o. female)  Date: 7/20/2021  Diagnosis: Acute metabolic encephalopathy [L63.87]  UTI (urinary tract infection) [N39.0] <principal problem not specified>       Precautions: Fall  Chart, physical therapy assessment, plan of care and goals were reviewed. ASSESSMENT  Patient continues with skilled PT services and is progressing towards goals. Pt received supine in bed  and willing to work with therapy. Pt reports abdmonen pain with \"deep cramps\", however willing to perform OOB activities. Pt continues with bed mobility to R side EOB with no assistance. Pt continued with STS from bedside to RW with SBA, followed by amb to restroom with no LOB with little  UE support with RW. Pt continued with piovt STS to toilet with loose BM and reports decreased pain with abdomen. Upon completing self care with no assistance, pt continued with gait training in hallway up to 300' with RW at [facility].  Pt presents with decreased gait speed and shuffled gait pattern. Upon return to room pt performed pivot and sat in recliner with no VC needed. Pt completed session with call bell within reach and all needs met at the time. Rn notified of session. Current Level of Function Impacting Discharge (mobility/balance): SBA for all mobility, amb up to 300' with RW    Other factors to consider for discharge: fall risk, not much assistance with household task. PLAN :  Patient continues to benefit from skilled intervention to address the above impairments. Continue treatment per established plan of care. to address goals. Recommendation for discharge: (in order for the patient to meet his/her long term goals)  Physical therapy at least 2 days/week in the home     This discharge recommendation:  Has not yet been discussed the attending provider and/or case management    IF patient discharges home will need the following DME: patient owns DME required for discharge       SUBJECTIVE:   Patient stated My  does no help much around the house. Coni Hardy    OBJECTIVE DATA SUMMARY:   Critical Behavior:  Neurologic State: Alert  Orientation Level: Oriented X4  Cognition: Follows commands  Safety/Judgement: Awareness of environment, Fall prevention  Functional Mobility Training:  Bed Mobility:  Rolling: Supervision  Supine to Sit: Supervision  Scooting: Supervision     Transfers:  Sit to Stand: Stand-by assistance  Stand to Sit: Stand-by assistance  Bed to Chair: Stand-by assistance     Balance:  Sitting: Intact  Standing: Impaired  Standing - Static: Good;Fair  Standing - Dynamic : Constant support;Good    Ambulation/Gait Training:  Distance (ft): 300 Feet (ft)  Assistive Device: Gait belt;Walker, rolling  Ambulation - Level of Assistance: Stand-by assistance  Gait Abnormalities: Decreased step clearance  Base of Support: Widened  Speed/Lesvia: Pace decreased (<100 feet/min)  Step Length: Right shortened;Left shortened  Pain Rating:  Abdomen pain reported    Activity Tolerance:   Good    After treatment patient left in no apparent distress:   Sitting in chair and Call bell within reach    COMMUNICATION/COLLABORATION:   The patients plan of care was discussed with: Registered nurse.      Augustine Richards PTA   Time Calculation: 41 mins

## 2021-07-20 NOTE — DISCHARGE INSTRUCTIONS
HOSPITALIST DISCHARGE INSTRUCTIONS    NAME: Tamiko Spencer   :  1955   MRN:  [id number]     Date/Time:  2021 12:08 PM    ADMIT DATE: 7/15/2021     DISCHARGE DATE: 2021     DISCHARGE DIAGNOSIS:  Sepsis due to urinary tract infection POA- resolved, s/p IV Abx x 3 days, therapy completed, Probitoics  Chronic diarrhea due to exocrine pancreatic insufficiency (EPI) POA- started on Zenpep  3 cap TID with meals as per GI recommendations, can use Lomotil prn diarrhea, Outpatient GI Dr Robert Olivares follow up as recommended  Dilated CBD on CT & Ultrasound  POA- asymptomatic, no GS, no cholecystitis  Bilateral lower extremity numbness  Unsteady gait  History of degenerative disease of the spine  JESENIA- resolved  GERD  Neuropathy  Dyslipidemia     Full code      Active Problems:    UTI (urinary tract infection) (7/15/2021)      Acute metabolic encephalopathy ()         MEDICATIONS:  As per medication reconciliation  list  · It is important that you take the medication exactly as they are prescribed. · Keep your medication in the bottles provided by the pharmacist and keep a list of the medication names, dosages, and times to be taken in your wallet. · Do not take other medications without consulting your doctor. Pain Management: per above medications    What to do at Home    Recommended diet:  Cardiac Diet    Recommended activity: Activity as tolerated    If you have questions regarding the hospital related prescriptions or hospital related issues please call [facility] jose r Guerrier at [phone]. If you experience any of the following symptoms then please call your primary care physician or return to the emergency room if you cannot get hold of your doctor:  Fever, chills, nausea, vomiting, diarrhea, change in mentation, falling, bleeding, shortness of breath,     Follow Up:  Dr. Charley Balderas, NP  you are to call and set up an appointment to see them in 7-10 days.    Barry Dugan (GI doctor) in 1 week for diarrhea follow up    Information obtained by :  I understand that if any problems occur once I am at home I am to contact my physician. I understand and acknowledge receipt of the instructions indicated above.                                                                                                                                            Physician's or R.N.'s Signature                                                                  Date/Time                                                                                                                                              Patient or Representative Signature                                                          Date/Time

## 2021-07-20 NOTE — PROGRESS NOTES
Problem: Falls - Risk of  Goal: *Absence of Falls  Description: Document Meredith Jones Fall Risk and appropriate interventions in the flowsheet. Outcome: Resolved/Met  Note: Fall Risk Interventions:  Mobility Interventions: Patient to call before getting OOB         Medication Interventions: Patient to call before getting OOB    Elimination Interventions: Call light in reach    History of Falls Interventions: Bed/chair exit alarm, Consult care management for discharge planning, Evaluate medications/consider consulting pharmacy         Problem: Patient Education: Go to Patient Education Activity  Goal: Patient/Family Education  Outcome: Resolved/Met     Problem: Patient Education: Go to Patient Education Activity  Goal: Patient/Family Education  Outcome: Resolved/Met     Problem: Risk for Spread of Infection  Goal: Prevent transmission of infectious organism to others  Description: Prevent the transmission of infectious organisms to other patients, staff members, and visitors.   Outcome: Resolved/Met     Problem: Patient Education:  Go to Education Activity  Goal: Patient/Family Education  Outcome: Resolved/Met     Problem: Patient Education:  Go to Education Activity  Goal: Patient/Family Education  Outcome: Resolved/Met     Problem: Patient Education: Go to Patient Education Activity  Goal: Patient/Family Education  Outcome: Resolved/Met     Problem: Patient Education: Go to Patient Education Activity  Goal: Patient/Family Education  Outcome: Resolved/Met     Problem: Sepsis: Day 2  Goal: Off Pathway (Use only if patient is Off Pathway)  Outcome: Resolved/Met  Goal: *Central Venous Pressure maintained at 8-12 mm Hg  Outcome: Resolved/Met  Goal: *Hemodynamically stable  Outcome: Resolved/Met  Goal: *Tolerating diet  Outcome: Resolved/Met  Goal: Activity/Safety  Outcome: Resolved/Met  Goal: Consults, if ordered  Outcome: Resolved/Met  Goal: Diagnostic Test/Procedures  Outcome: Resolved/Met  Goal: Nutrition/Diet  Outcome: Resolved/Met  Goal: Discharge Planning  Outcome: Resolved/Met  Goal: Medications  Outcome: Resolved/Met  Goal: Respiratory  Outcome: Resolved/Met  Goal: Treatments/Interventions/Procedures  Outcome: Resolved/Met  Goal: Psychosocial  Outcome: Resolved/Met     Problem: Sepsis: Day 3  Goal: Off Pathway (Use only if patient is Off Pathway)  Outcome: Resolved/Met  Goal: *Central Venous Pressure maintained at 8-12 mm Hg  Outcome: Resolved/Met  Goal: *Oxygen saturation within defined limits  Outcome: Resolved/Met  Goal: *Vital sign stability  Outcome: Resolved/Met  Goal: *Tolerating diet  Outcome: Resolved/Met  Goal: *Demonstrates progressive activity  Outcome: Resolved/Met  Goal: Activity/Safety  Outcome: Resolved/Met  Goal: Consults, if ordered  Outcome: Resolved/Met  Goal: Diagnostic Test/Procedures  Outcome: Resolved/Met  Goal: Nutrition/Diet  Outcome: Resolved/Met  Goal: Discharge Planning  Outcome: Resolved/Met  Goal: Medications  Outcome: Resolved/Met  Goal: Respiratory  Outcome: Resolved/Met  Goal: Treatments/Interventions/Procedures  Outcome: Resolved/Met  Goal: Psychosocial  Outcome: Resolved/Met     Problem: Sepsis: Day 4  Goal: Off Pathway (Use only if patient is Off Pathway)  Outcome: Resolved/Met  Goal: Activity/Safety  Outcome: Resolved/Met  Goal: Consults, if ordered  Outcome: Resolved/Met  Goal: Diagnostic Test/Procedures  Outcome: Resolved/Met  Goal: Nutrition/Diet  Outcome: Resolved/Met  Goal: Discharge Planning  Outcome: Resolved/Met  Goal: Medications  Outcome: Resolved/Met  Goal: Respiratory  Outcome: Resolved/Met  Goal: Treatments/Interventions/Procedures  Outcome: Resolved/Met  Goal: Psychosocial  Outcome: Resolved/Met  Goal: *Oxygen saturation within defined limits  Outcome: Resolved/Met  Goal: *Hemodynamically stable  Outcome: Resolved/Met  Goal: *Vital signs within defined limits  Outcome: Resolved/Met  Goal: *Tolerating diet  Outcome: Resolved/Met  Goal: *Demonstrates progressive activity  Outcome: Resolved/Met  Goal: *Fluid volume maintenance  Outcome: Resolved/Met     Problem: Sepsis: Day 5  Goal: Off Pathway (Use only if patient is Off Pathway)  Outcome: Resolved/Met  Goal: *Oxygen saturation within defined limits  Outcome: Resolved/Met  Goal: *Vital signs within defined limits  Outcome: Resolved/Met  Goal: *Tolerating diet  Outcome: Resolved/Met  Goal: *Demonstrates progressive activity  Outcome: Resolved/Met  Goal: *Discharge plan identified  Outcome: Resolved/Met  Goal: Activity/Safety  Outcome: Resolved/Met  Goal: Consults, if ordered  Outcome: Resolved/Met  Goal: Diagnostic Test/Procedures  Outcome: Resolved/Met  Goal: Nutrition/Diet  Outcome: Resolved/Met  Goal: Discharge Planning  Outcome: Resolved/Met  Goal: Medications  Outcome: Resolved/Met  Goal: Respiratory  Outcome: Resolved/Met  Goal: Treatments/Interventions/Procedures  Outcome: Resolved/Met  Goal: Psychosocial  Outcome: Resolved/Met     Problem: Sepsis: Day 6  Goal: Off Pathway (Use only if patient is Off Pathway)  Outcome: Resolved/Met  Goal: *Oxygen saturation within defined limits  Outcome: Resolved/Met  Goal: *Vital signs within defined limits  Outcome: Resolved/Met  Goal: *Tolerating diet  Outcome: Resolved/Met  Goal: *Demonstrates progressive activity  Outcome: Resolved/Met  Goal: Influenza immunization  Outcome: Resolved/Met  Goal: *Pneumococcal immunization  Outcome: Resolved/Met  Goal: Activity/Safety  Outcome: Resolved/Met  Goal: Diagnostic Test/Procedures  Outcome: Resolved/Met  Goal: Nutrition/Diet  Outcome: Resolved/Met  Goal: Discharge Planning  Outcome: Resolved/Met  Goal: Medications  Outcome: Resolved/Met  Goal: Respiratory  Outcome: Resolved/Met  Goal: Treatments/Interventions/Procedures  Outcome: Resolved/Met  Goal: Psychosocial  Outcome: Resolved/Met     Problem: Sepsis: Discharge Outcomes  Goal: *Vital signs within defined limits  Outcome: Resolved/Met  Goal: *Tolerating diet  Outcome: Resolved/Met  Goal: *Verbalizes understanding and describes prescribed diet  Outcome: Resolved/Met  Goal: *Demonstrates progressive activity  Outcome: Resolved/Met  Goal: *Describes follow-up/return visits to physicians  Outcome: Resolved/Met  Goal: *Verbalizes name, dosage, time, side effects, and number of days to continue medications  Outcome: Resolved/Met  Goal: *Influenza immunization (Oct-Mar only)  Outcome: Resolved/Met  Goal: *Pneumococcal immunization  Outcome: Resolved/Met  Goal: *Lungs clear or at baseline  Outcome: Resolved/Met  Goal: *Oxygen saturation returns to baseline or 90% or better on room air  Outcome: Resolved/Met  Goal: *Glycemic control  Outcome: Resolved/Met  Goal: *Absence of deep venous thrombosis signs and symptoms(Stroke Metric)  Outcome: Resolved/Met  Goal: *Describes available resources and support systems  Outcome: Resolved/Met  Goal: *Optimal pain control at patient's stated goal  Outcome: Resolved/Met

## 2021-07-20 NOTE — ROUTINE PROCESS
[facility] F/U on 7/23/2021 @ 1pm Dr Richard Steward at the [facility].  Appt Noted on AVS

## 2021-07-20 NOTE — DISCHARGE SUMMARY
Hospitalist Discharge Summary     Patient ID:  Andi Correa  [id number]  63 y.o.  1955  7/15/2021    PCP on record: Edgard Kumar NP    Admit date: 7/15/2021  Discharge date and time: 7/20/2021    DISCHARGE DIAGNOSIS:    Sepsis due to urinary tract infection POA- resolved, s/p IV Abx x 3 days, therapy completed, Probitoics  Chronic diarrhea due to exocrine pancreatic insufficiency (EPI) POA- started on Zenpep  3 cap TID with meals as per GI recommendations, can use Lomotil prn diarrhea, Outpatient GI Dr Mariza Lewis follow up as recommended  Dilated CBD on CT & Ultrasound  POA- asymptomatic, no GS, no cholecystitis  Bilateral lower extremity numbness  Unsteady gait  History of degenerative disease of the spine  JESENIA- resolved  GERD  Neuropathy  Dyslipidemia    Full code    CONSULTATIONS:  IP CONSULT TO HOSPITALIST  IP CONSULT TO ORTHOPEDIC SURGERY  IP CONSULT TO GASTROENTEROLOGY    Excerpted HPI from H&P of Sundar Ch MD:  Hu.Millers y.o. female who presents with past medical history of GERD, neuropathy, dyslipidemia is coming the hospital chief complaints of weakness of both legs along with numbness and also unsteady gait. She reports weakness is involving both proximal and distal muscles. She also reports occasional numbness as well. She reports symptoms have been going on for at least a few months. She reports chronic tingling and numbness in both legs secondary to degenerative disease of the spine and has seen a neurologist and also orthopedics in the past.  She had a orthopedics appointment today and was advised to go to the emergency department for further evaluation and given her progressive symptoms.     On arrival to ED, she was noted to have fever. WBC was elevated at 15.1. BMP showed a creatinine of 1.94. LFTs are normal.  Lactic acid is 1.3. Troponin is 0.05. Chest x-ray shows no acute process.   CT head normal.     We were asked to admit for work up and evaluation of the above problems. \"    ______________________________________________________________________  DISCHARGE SUMMARY/HOSPITAL COURSE:  for full details see H&P, daily progress notes, labs, consult notes. Sepsis due to urinary tract infection  -Urinalysis with pyuria and bacteriuria   -Urine culture Proteus  -Blood cultures NGTD  -leukocytosis resolved. Procalcitonin 0.17. Completed 3 day course of rocephin 7/18     Dispo:  -home with [facility] tomorrow, 7/20, once GI eval complete and diarrhea better with enzymes.      Chronic diarrhea due to exocrine pancreatic insufficiency (EPI)  Dilated CBD on CT  -CT abdomen: IMPRESSION  1. Dilated common bile duct, with no visualized choledocholithiasis. 2. 5 mm nonobstructing left renal calculus. 3. Otherwise, no evidence of acute abdominal or pelvic process.     -recent colonoscopy 7/09/21 by Dr Rachel Garcia  Findings:   1.  Scope advanced to the cecum and terminal ileum. 2. Kathy Callander was a discrete ulcer seen in the sigmoid colon s/p Bx.  R/O NSAID vs. Ischemic vs. IBD  3.  No polyps.  Normal remaining mucosa in the colon and TI.  S/P Bx of the TI  4.  Grade 2 internal hemorrhoids.     -C. difficile negative; enteric bacteria neg, fecal leukocytes 0-4  -appreciate GI input. Patient was not aware of having EPI. Starting Bert Pep today. Patient reports 3-4 loose stools this morning, hopefully better by tomorrow. Will continue IVF but decrease the rate  -ABD US pending, to assess bile ducts. No LFT elevation     Bilateral lower extremity numbness  Unsteady gait  History of degenerative disease of the spine  -MRI of the brain IMPRESSION  1. No acute infarct. 2. Signal abnormality in the vidya which has progressed since 2013. Chronic left  basal ganglia lacunar infarct. Findings consistent with chronic small vessel  ischemic change.  -She had a recent MRI of the spine which shows evidence of severe stenosis  -Orthopedic input appreciated.  \"Pt is a candidate for revision decompression and fusion L4-S1 as a planned/shceduled out-pt procedure. Recommend medical treatment and optimization, rehab/PT/OT as necessary. Pt will need to be other wise well and functional(able to mobilize and work with therapy services) before considering ortho spine intervention. \"     -PT OT eval and treat. Plan for home health on discharge     JESENIA, resolved  -Due to prerenal factors/volume depletion.  -Taper IV fluids     GERD  Neuropathy  Dyslipidemia  Continue PPI, Cymbalta, gabapentin and Lipitor        Code Status: Full code  Surrogate Decision Mason Sanderson        _______________________________________________________________________  Patient seen and examined by me on discharge day. Pertinent Findings:  Gen:    Not in distress  Chest: Clear lungs  CVS:   Regular rhythm. No edema  Abd:  Soft, not distended, not tender  Neuro:  Alert, oriented x 3  _______________________________________________________________________  DISCHARGE MEDICATIONS:   Current Discharge Medication List      START taking these medications    Details   diphenoxylate-atropine (LOMOTIL) 2.5-0.025 mg per tablet Take 1 Tablet by mouth four (4) times daily as needed for Diarrhea. Max Daily Amount: 4 Tablets. Qty: 20 Tablet, Refills: 0  Start date: 7/20/2021    Associated Diagnoses: Chronic diarrhea      L.acid,para-B. bifidum-S.therm (RISAQUAD) 8 billion cell cap cap Take 1 Capsule by mouth daily. Qty: 30 Capsule, Refills: 0  Start date: 7/21/2021      lipase-protease-amylase (ZENPEP 20,000) 20,000-63,000- 84,000 unit capsule Take 3 Capsules by mouth three (3) times daily (with meals) for 30 days. Qty: 270 Capsule, Refills: 0  Start date: 7/20/2021, End date: 8/19/2021         CONTINUE these medications which have NOT CHANGED    Details   ergocalciferol (Vitamin D2) 1,250 mcg (50,000 unit) capsule Take 5,000 Units by mouth.       acetaminophen (TYLENOL EXTRA STRENGTH) 500 mg tablet Take 1,000 mg by mouth every six (6) hours as needed for Pain. DULoxetine (CYMBALTA) 60 mg capsule TAKE 2 CAPSULES DAILY  Qty: 180 Cap, Refills: 2    Associated Diagnoses: Neuropathy; Gait abnormality      TURMERIC PO Take 500 mg by mouth daily. cholecalciferol (Vitamin D3) (5000 Units/125 mcg) tab tablet Take 5,000 Units by mouth daily. Alpha Lipoic Acid 600 mg cap Take 600 mg by mouth daily. traZODone (DESYREL) 50 mg tablet Take 100 mg by mouth nightly.      gabapentin (NEURONTIN) 600 mg tablet Take 1,200 mg by mouth three (3) times daily. esomeprazole (NEXIUM) 20 mg capsule Take 20 mg by mouth daily. Indications: Heartburn      diclofenac (VOLTAREN) 1 % gel Apply  to affected area as needed. diphenhydrAMINE (BENADRYL) 50 mg tablet Take 100 mg by mouth two (2) times a day. Indications: ALLERGIC REACTIONS      SIMVASTATIN (ZOCOR PO) Take 40 mg by mouth nightly. STOP taking these medications       doxycycline (MONODOX) 100 mg capsule Comments:   Reason for Stopping:                 Patient Follow Up Instructions:    Activity: Activity as tolerated  Diet: Cardiac Diet      Follow-up with Dr Linda Osorio (GI doctor) in 1 week for management for Diarrhea      Follow-up Information     Follow up With Specialties Details Why Contact Info    Facundo Palma NP Nurse Practitioner   [address] [place] [address]  [phone]          ________________________________________________________________    Risk of deterioration: Low    Condition at Discharge:  Stable  __________________________________________________________________    Disposition  Home with family and home health services    ____________________________________________________________________    Code Status: Full Code  ___________________________________________________________________      Total time in minutes spent coordinating this discharge (includes going over instructions, follow-up, prescriptions, and preparing report for sign off to her PCP) :  >30 minutes    Signed:  Rendell Bumpers, MD

## 2021-07-20 NOTE — PROGRESS NOTES
SPEECH THERAPY SCREENING:  SERVICES ARE NOT INDICATED AT THIS TIME    [facility] therapy based on results obtained during the nursing admission assessment. The patients chart was reviewed and the patient is not appropriate for a skilled therapy evaluation at this time. Please consult speech therapy if any therapy needs arise. Thank you. Note patient passed the STAND.     Maryann Lewis, SLP

## 2021-07-26 NOTE — PROGRESS NOTES
Physician Progress Note      PATIENTCarl Danika  CSN #:                  [id number]  :                       1955  ADMIT DATE:       7/15/2021 2:44 PM  [address] [place] DATE:        2021 2:55 PM  RESPONDING  PROVIDER #:        Priyanka Mcwilliams MD          QUERY TEXT:    Patient admitted with sepsis d/t uti. Documentation reflects \"Metabolic [id number]\"() . If possible, please document in the progress notes and discharge summary if metabolic encephalopathy was: The medical record reflects the following:  Risk Factors: sepsis due to uti, brianne  Clinical Indicators:  gi: presented with fever, mental status changes and uti, Acute metabolic encephalopathy ()  Treatment: Rocephin,mri brain  Thanks,  Edil Negron RN/CDI   Options provided:  -- Metabolic Encephalopathy confirmed after study  -- Metabolic Encephalopathy treated and resolved  -- Metabolic Encephalopathy ruled out after study  -- Other - I will add my own diagnosis  -- Disagree - Not applicable / Not valid  -- Disagree - Clinically unable to determine / Unknown  -- Refer to Clinical Documentation Reviewer    PROVIDER RESPONSE TEXT:    Metabolic Encephalopathy treated and resolved.     Query created by: Alyse Byers on 2021 9:54 AM      Electronically signed by:  Priyanka Mcwilliams MD 2021 12:08 PM

## 2021-08-16 ENCOUNTER — TRANSCRIBE ORDER (OUTPATIENT)
Dept: SCHEDULING | Age: 66
End: 2021-08-16

## 2021-08-16 DIAGNOSIS — R19.7 DIARRHEA, UNSPECIFIED: ICD-10-CM

## 2021-08-16 DIAGNOSIS — K86.81 EXOCRINE PANCREATIC INSUFFICIENCY: Primary | ICD-10-CM

## 2021-08-16 DIAGNOSIS — K83.8 DILATION OF BILIARY TRACT: ICD-10-CM

## 2021-08-28 ENCOUNTER — HOSPITAL ENCOUNTER (OUTPATIENT)
Dept: MRI IMAGING | Age: 66
Discharge: HOME | End: 2021-08-28
Attending: SPECIALIST
Payer: MEDICARE

## 2021-08-28 DIAGNOSIS — K86.81 EXOCRINE PANCREATIC INSUFFICIENCY: ICD-10-CM

## 2021-08-28 DIAGNOSIS — K83.8 DILATION OF BILIARY TRACT: ICD-10-CM

## 2021-08-28 DIAGNOSIS — R19.7 DIARRHEA, UNSPECIFIED: ICD-10-CM

## 2021-08-28 PROCEDURE — A9585 GADOBUTROL INJECTION: HCPCS | Performed by: SPECIALIST

## 2021-08-28 PROCEDURE — 74011250636 HC RX REV CODE- 250/636: Performed by: SPECIALIST

## 2021-08-28 PROCEDURE — 74183 MRI ABD W/O CNTR FLWD CNTR: CPT

## 2021-08-28 RX ADMIN — GADOBUTROL 6 ML: 604.72 INJECTION INTRAVENOUS at 17:00

## 2021-09-20 ENCOUNTER — TRANSCRIBE ORDER (OUTPATIENT)
Dept: SCHEDULING | Age: 66
End: 2021-09-20

## 2021-09-20 DIAGNOSIS — Z12.31 SCREENING MAMMOGRAM FOR HIGH-RISK PATIENT: Primary | ICD-10-CM

## 2021-10-07 ENCOUNTER — HOSPITAL ENCOUNTER (OUTPATIENT)
Dept: MAMMOGRAPHY | Age: 66
Discharge: HOME | End: 2021-10-07
Attending: FAMILY MEDICINE
Payer: MEDICARE

## 2021-10-07 DIAGNOSIS — Z12.31 SCREENING MAMMOGRAM FOR HIGH-RISK PATIENT: ICD-10-CM

## 2021-10-07 PROCEDURE — 77067 SCR MAMMO BI INCL CAD: CPT

## 2022-03-19 PROBLEM — M51.26 HNP (HERNIATED NUCLEUS PULPOSUS), LUMBAR: Status: ACTIVE | Noted: 2018-06-18

## 2022-03-19 PROBLEM — Z98.890 S/P DISCECTOMY: Status: ACTIVE | Noted: 2018-06-18

## 2022-03-19 PROBLEM — N39.0 UTI (URINARY TRACT INFECTION): Status: ACTIVE | Noted: 2021-07-15

## 2022-03-20 PROBLEM — G93.41 ACUTE METABOLIC ENCEPHALOPATHY: Status: ACTIVE | Noted: 2021-07-15

## 2022-03-20 PROBLEM — M48.061 SPINAL STENOSIS OF LUMBAR REGION AT MULTIPLE LEVELS: Status: ACTIVE | Noted: 2018-10-10

## 2022-10-21 ENCOUNTER — TRANSCRIBE ORDER (OUTPATIENT)
Dept: SCHEDULING | Age: 67
End: 2022-10-21

## 2022-10-21 DIAGNOSIS — Z12.31 SCREENING MAMMOGRAM FOR HIGH-RISK PATIENT: Primary | ICD-10-CM

## 2022-11-09 ENCOUNTER — HOSPITAL ENCOUNTER (OUTPATIENT)
Dept: MAMMOGRAPHY | Age: 67
Discharge: HOME | End: 2022-11-09
Attending: FAMILY MEDICINE
Payer: MEDICARE

## 2022-11-09 DIAGNOSIS — Z12.31 SCREENING MAMMOGRAM FOR HIGH-RISK PATIENT: ICD-10-CM

## 2022-11-09 PROCEDURE — 77067 SCR MAMMO BI INCL CAD: CPT

## 2023-01-20 ENCOUNTER — TRANSCRIBE ORDER (OUTPATIENT)
Dept: SCHEDULING | Age: 68
End: 2023-01-20

## 2023-01-20 DIAGNOSIS — M96.1 POSTLAMINECTOMY SYNDROME, LUMBAR REGION: ICD-10-CM

## 2023-01-20 DIAGNOSIS — Z98.1 S/P SPINAL FUSION: ICD-10-CM

## 2023-01-20 DIAGNOSIS — M47.817 LUMBOSACRAL SPONDYLOSIS WITHOUT MYELOPATHY: Primary | ICD-10-CM

## 2023-01-20 DIAGNOSIS — M51.26 RECURRENT DISPLACEMENT OF LUMBAR DISC: ICD-10-CM

## 2023-02-06 ENCOUNTER — HOSPITAL ENCOUNTER (OUTPATIENT)
Dept: MRI IMAGING | Age: 68
Discharge: HOME | End: 2023-02-06
Attending: PHYSICAL MEDICINE & REHABILITATION
Payer: MEDICARE

## 2023-02-06 DIAGNOSIS — M96.1 POSTLAMINECTOMY SYNDROME, LUMBAR REGION: ICD-10-CM

## 2023-02-06 DIAGNOSIS — M47.817 LUMBOSACRAL SPONDYLOSIS WITHOUT MYELOPATHY: ICD-10-CM

## 2023-02-06 DIAGNOSIS — M51.26 RECURRENT DISPLACEMENT OF LUMBAR DISC: ICD-10-CM

## 2023-02-06 DIAGNOSIS — Z98.1 S/P SPINAL FUSION: ICD-10-CM

## 2023-02-06 PROCEDURE — 72148 MRI LUMBAR SPINE W/O DYE: CPT

## 2023-06-01 ENCOUNTER — ANESTHESIA EVENT (OUTPATIENT)
Facility: HOSPITAL | Age: 68
End: 2023-06-01
Payer: MEDICARE

## 2023-06-01 ENCOUNTER — HOSPITAL ENCOUNTER (OUTPATIENT)
Facility: HOSPITAL | Age: 68
Setting detail: OUTPATIENT SURGERY
Discharge: HOME | End: 2023-06-01
Attending: SPECIALIST | Admitting: SPECIALIST
Payer: MEDICARE

## 2023-06-01 ENCOUNTER — ANESTHESIA (OUTPATIENT)
Facility: HOSPITAL | Age: 68
End: 2023-06-01
Payer: MEDICARE

## 2023-06-01 VITALS
HEART RATE: 72 BPM | RESPIRATION RATE: 16 BRPM | SYSTOLIC BLOOD PRESSURE: 144 MMHG | BODY MASS INDEX: 26.68 KG/M2 | DIASTOLIC BLOOD PRESSURE: 89 MMHG | OXYGEN SATURATION: 98 % | HEIGHT: 62 IN | TEMPERATURE: 97.9 F | WEIGHT: 145 LBS

## 2023-06-01 PROCEDURE — 3600007512: Performed by: SPECIALIST

## 2023-06-01 PROCEDURE — 7100000011 HC PHASE II RECOVERY - ADDTL 15 MIN: Performed by: SPECIALIST

## 2023-06-01 PROCEDURE — 2709999900 HC NON-CHARGEABLE SUPPLY: Performed by: SPECIALIST

## 2023-06-01 PROCEDURE — 3700000001 HC ADD 15 MINUTES (ANESTHESIA): Performed by: SPECIALIST

## 2023-06-01 PROCEDURE — 2500000003 HC RX 250 WO HCPCS: Performed by: REGISTERED NURSE

## 2023-06-01 PROCEDURE — 88305 TISSUE EXAM BY PATHOLOGIST: CPT

## 2023-06-01 PROCEDURE — 3700000000 HC ANESTHESIA ATTENDED CARE: Performed by: SPECIALIST

## 2023-06-01 PROCEDURE — 2580000003 HC RX 258: Performed by: SPECIALIST

## 2023-06-01 PROCEDURE — 3600007502: Performed by: SPECIALIST

## 2023-06-01 PROCEDURE — 6360000002 HC RX W HCPCS: Performed by: REGISTERED NURSE

## 2023-06-01 PROCEDURE — 7100000010 HC PHASE II RECOVERY - FIRST 15 MIN: Performed by: SPECIALIST

## 2023-06-01 RX ORDER — LIDOCAINE HYDROCHLORIDE 20 MG/ML
INJECTION, SOLUTION EPIDURAL; INFILTRATION; INTRACAUDAL; PERINEURAL PRN
Status: DISCONTINUED | OUTPATIENT
Start: 2023-06-01 | End: 2023-06-01 | Stop reason: SDUPTHER

## 2023-06-01 RX ORDER — SODIUM CHLORIDE 0.9 % (FLUSH) 0.9 %
5-40 SYRINGE (ML) INJECTION EVERY 12 HOURS SCHEDULED
Status: DISCONTINUED | OUTPATIENT
Start: 2023-06-01 | End: 2023-06-01 | Stop reason: HOSPADM

## 2023-06-01 RX ORDER — SODIUM CHLORIDE 0.9 % (FLUSH) 0.9 %
5-40 SYRINGE (ML) INJECTION PRN
Status: DISCONTINUED | OUTPATIENT
Start: 2023-06-01 | End: 2023-06-01 | Stop reason: HOSPADM

## 2023-06-01 RX ORDER — SODIUM CHLORIDE 9 MG/ML
25 INJECTION, SOLUTION INTRAVENOUS PRN
Status: DISCONTINUED | OUTPATIENT
Start: 2023-06-01 | End: 2023-06-01 | Stop reason: HOSPADM

## 2023-06-01 RX ADMIN — SODIUM CHLORIDE: 9 INJECTION, SOLUTION INTRAVENOUS at 09:03

## 2023-06-01 RX ADMIN — PROPOFOL 50 MG: 10 INJECTION, EMULSION INTRAVENOUS at 08:58

## 2023-06-01 RX ADMIN — LIDOCAINE HYDROCHLORIDE 40 MG: 20 INJECTION, SOLUTION EPIDURAL; INFILTRATION; INTRACAUDAL; PERINEURAL at 08:48

## 2023-06-01 RX ADMIN — SODIUM CHLORIDE 1000 ML: 9 INJECTION, SOLUTION INTRAVENOUS at 08:31

## 2023-06-01 RX ADMIN — PROPOFOL 100 MG: 10 INJECTION, EMULSION INTRAVENOUS at 08:48

## 2023-06-01 RX ADMIN — PROPOFOL 50 MG: 10 INJECTION, EMULSION INTRAVENOUS at 08:53

## 2023-06-01 ASSESSMENT — PAIN - FUNCTIONAL ASSESSMENT: PAIN_FUNCTIONAL_ASSESSMENT: 0-10

## 2023-06-01 ASSESSMENT — PAIN SCALES - GENERAL: PAINLEVEL_OUTOF10: 0

## 2023-06-01 NOTE — ANESTHESIA POSTPROCEDURE EVALUATION
Department of Anesthesiology  Postprocedure Note    Patient: Danuta Rabago  MRN: [id number]  YOB: 1955  Date of evaluation: 6/1/2023      Procedure Summary     Date: 06/01/23 Room / Location: [facility] ENDO 04 / [facility] ENDOSCOPY    Anesthesia Start: 0843 Anesthesia Stop: 1777    Procedure: COLONOSCOPY (Lower GI Region) Diagnosis:       Exocrine pancreatic insufficiency      Diarrhea, unspecified type      Incontinence of feces, unspecified fecal incontinence type      Adenoma of colon      Urinary incontinence, unspecified type      (Exocrine pancreatic insufficiency [K86.81])      (Diarrhea, unspecified type [R19.7])      (Incontinence of feces, unspecified fecal incontinence type [R15.9])      (Adenoma of colon [D12.6])      (Urinary incontinence, unspecified type [R32])    Surgeons: Destini Mcmillan MD Responsible Provider: Eric Herring MD    Anesthesia Type: MAC ASA Status: 3          Anesthesia Type: MAC    Salty Phase I: Salty Score: 10    Salty Phase II: Salty Score: 10      Anesthesia Post Evaluation    Patient location during evaluation: PACU  Patient participation: complete - patient participated  Level of consciousness: sleepy but conscious and responsive to verbal stimuli  Pain score: 1  Airway patency: patent  Nausea & Vomiting: no vomiting and no nausea  Complications: no  Cardiovascular status: blood pressure returned to baseline and hemodynamically stable  Respiratory status: acceptable  Hydration status: stable

## 2023-06-01 NOTE — PROGRESS NOTES
ARRIVAL INFORMATION:  Verified patient name and date of birth, scheduled procedure, and informed consent. : Meena Tovar () contact number: [phone]  Physician and staff can share information with the . Belongings with patient include:  Clothing,Glasses, Dentures upper and lower        GI FOCUSED ASSESSMENT:  Neuro: Awake, alert, oriented x4  Respiratory: even and unlabored   GI: soft and non-distended  EKG Rhythm: normal sinus rhythm    Education:Reviewed general discharge instructions and  information.

## 2023-06-01 NOTE — PROGRESS NOTES
Endoscopy Case End Note:    0900:  Procedure scope was pre-cleaned, per protocol, at bedside by Timothy Spear. 1295:  Report received from anesthesia - Patrice Holland CRNA. See anesthesia flowsheet for intra-procedure vital signs and events. 5215:  Glasses returned to patient.

## 2023-06-01 NOTE — DISCHARGE INSTRUCTIONS
general anesthesia or intravenous analgesia / sedation  you may experience:  Drowsiness, dizziness, sleepiness, or confusion  Difficulty remembering or delayed reaction times  Difficulty with your balance, especially while walking, move slowly and carefully, do not make sudden position changes  Difficulty focusing or blurred vision    You may not be aware of slight changes in your behavior and/or your reaction time because of the medication used during and after your procedure.     Report the following to your physician:  Excessive pain, swelling, redness or odor of or around the surgical area  Temperature over 100.5  Nausea and vomiting lasting longer than 4 hours or if unable to take medications  Any signs of decreased circulation or nerve impairment to extremity: change in color, persistent numbness, tingling, coldness or increase pain  Any questions or concerns    IF YOU REPORT TO AN EMERGENCY ROOM, DOCTOR'S OFFICE OR HOSPITAL WITHIN 24 HOURS AFTER YOUR PROCEDURE, BRING THIS SHEET AND YOUR AFTER VISIT SUMMARY WITH YOU AND GIVE IT TO THE PHYSICIAN OR NURSE ATTENDING YOU.

## 2023-06-01 NOTE — ANESTHESIA PRE PROCEDURE
 Fibromyalgia     GERD (gastroesophageal reflux disease)     Hypercholesteremia     Neuropathy     Osteoarthritis     Other ill-defined conditions(799.89)     GRANULOMA ANEULARE, SKIN DISORDER    Psychiatric disorder     ANXIETY    Rheumatoid arthritis ([facility] [address]     S/P radiation therapy 2008    right breast    Seasonal allergies        Past Surgical History:        Procedure Laterality Date    APPENDECTOMY      BREAST SURGERY Right 2/2008    RIGHT BREAST LUMPECTOMY    CARPAL TUNNEL RELEASE Bilateral     CERVICAL DISCECTOMY  2/3/14    C4-5     COLONOSCOPY N/A 7/9/2021    COLONOSCOPY performed by Nacho Wiggins [facility] at [facility] ENDOSCOPY    COLONOSCOPY N/A 5/25/2018    COLONOSCOPY performed by Trinity Sesay MD at [facility] ENDOSCOPY    COLONOSCOPY      GI      ENDOSCOPY (DILATATION/ESOPH)    HYSTERECTOMY (CERVIX STATUS UNKNOWN)  1982    partial    KNEE ARTHROSCOPY Left     x2    LUMBAR DISCECTOMY  06/2018    MOHS SURGERY Right     MRI BREAST BIOPSY INITIAL Left     ORTHOPEDIC SURGERY  2005, 2011    CERVICAL NECKx 3    ORTHOPEDIC SURGERY Bilateral 1988    carpal tunnel repair, and bilater knee arthroscopy    ORTHOPEDIC SURGERY Left     shoulder arthroscopy    OTHER SURGICAL HISTORY  3/2008    hematoma s/p Rt BR lumpectomy    SD UNLISTED PROCEDURE ABDOMEN PERITONEUM & OMENTUM  1998    COLECTOMY (1/2 REMOVED; \"WAS IN KNOTS\")    TONSILLECTOMY  1963    T&A       Social History:    Social History     Tobacco Use    Smoking status: Every Day     Packs/day: 0.50     Types: Cigarettes    Smokeless tobacco: Never   Substance Use Topics    Alcohol use: Yes     Comment: occas                                Ready to quit: Not Answered  Counseling given: Not Answered      Vital Signs (Current): There were no vitals filed for this visit.                                            BP Readings from Last 3 Encounters:   No data found for BP       NPO Status:

## 2023-06-01 NOTE — H&P
ORTHOPEDIC SURGERY Bilateral 1988    carpal tunnel repair, and bilater knee arthroscopy    ORTHOPEDIC SURGERY Left     shoulder arthroscopy    OTHER SURGICAL HISTORY  3/2008    hematoma s/p Rt BR lumpectomy    FL UNLISTED PROCEDURE ABDOMEN PERITONEUM & OMENTUM  1998    COLECTOMY (1/2 REMOVED; \"WAS IN KNOTS\")    TONSILLECTOMY  1963    T&A      Social History     Socioeconomic History    Marital status:      Spouse name: None    Number of children: None    Years of education: None    Highest education level: None   Tobacco Use    Smoking status: Every Day     Packs/day: 0.50     Types: Cigarettes    Smokeless tobacco: Never   Vaping Use    Vaping Use: Never used   Substance and Sexual Activity    Alcohol use: Yes     Comment: occas    Drug use: No      Family History   Problem Relation Age of Onset    Anesth Problems Neg Hx     Breast Cancer Maternal Aunt     Cancer Maternal Grandmother         colon    Diabetes Father     Heart Disease Mother     Heart Disease Father     High Cholesterol Father     Hypertension Mother     High Cholesterol Mother     Obesity Father        Medications:   Prior to Admission medications    Medication Sig Start Date End Date Taking? Authorizing Provider   TURMERIC PO Take 500 mg by mouth daily    Ar Automatic Reconciliation   acetaminophen (TYLENOL) 500 MG tablet Take 2 tablets by mouth every 6 hours as needed    Ar Automatic Reconciliation   Alpha-Lipoic Acid 600 MG CAPS Take 600 mg by mouth daily    Ar Automatic Reconciliation   vitamin D3 (CHOLECALCIFEROL) 125 MCG (5000 UT) TABS tablet Take 1 tablet by mouth daily    Ar Automatic Reconciliation   diclofenac sodium (VOLTAREN) 1 % GEL Apply topically as needed    Ar Automatic Reconciliation   diphenhydrAMINE (BENADRYL) 50 MG tablet Take 2 tablets by mouth 2 times daily    Ar Automatic Reconciliation   diphenoxylate-atropine (LOMOTIL) 2.5-0.025 MG per tablet Take 1 tablet by mouth 4 times daily as needed.  7/20/21   Ar Automatic

## 2023-11-13 ENCOUNTER — HOSPITAL ENCOUNTER (OUTPATIENT)
Facility: HOSPITAL | Age: 68
Discharge: HOME | End: 2023-11-16
Payer: MEDICARE

## 2023-11-13 ENCOUNTER — HOSPITAL ENCOUNTER (OUTPATIENT)
Facility: HOSPITAL | Age: 68
Discharge: HOME | End: 2023-11-16
Attending: FAMILY MEDICINE
Payer: MEDICARE

## 2023-11-13 DIAGNOSIS — Z78.0 ASYMPTOMATIC MENOPAUSAL STATE: ICD-10-CM

## 2023-11-13 DIAGNOSIS — Z12.31 VISIT FOR SCREENING MAMMOGRAM: ICD-10-CM

## 2023-11-13 PROCEDURE — 77080 DXA BONE DENSITY AXIAL: CPT

## 2023-11-13 PROCEDURE — 77067 SCR MAMMO BI INCL CAD: CPT

## 2025-04-18 ENCOUNTER — TRANSCRIBE ORDERS (OUTPATIENT)
Facility: HOSPITAL | Age: 70
End: 2025-04-18

## 2025-04-18 DIAGNOSIS — I10 ESSENTIAL (PRIMARY) HYPERTENSION: Primary | ICD-10-CM

## 2025-05-08 ENCOUNTER — HOSPITAL ENCOUNTER (OUTPATIENT)
Facility: HOSPITAL | Age: 70
Discharge: HOME | End: 2025-05-10
Payer: MEDICARE

## 2025-05-08 DIAGNOSIS — I10 ESSENTIAL (PRIMARY) HYPERTENSION: ICD-10-CM

## 2025-05-08 PROCEDURE — 93975 VASCULAR STUDY: CPT

## 2025-05-09 LAB
VAS AORTA DIST AP: 1.3 CM
VAS AORTA MID PSV: 78.2 CM/S
VAS AORTA PROX AP: 1.84 CM
VAS AORTA PROX PSV: 89.4 CM/S
VAS CELIAC EDV: 24.7 CM/S
VAS CELIAC PSV: 107.6 CM/S
VAS LEFT KIDNEY LENGTH: 9.99 CM
VAS LEFT KIDNEY WIDTH: 3.99 CM
VAS LEFT RENAL DIST EDV: 8 CM/S
VAS LEFT RENAL DIST PSV: 32.7 CM/S
VAS LEFT RENAL DIST RAR: 0.42
VAS LEFT RENAL DIST RI: 0.76
VAS LEFT RENAL LOWER PARENCHYMA EDV: 8.8 CM/S
VAS LEFT RENAL LOWER PARENCHYMA PSV: 24.4 CM/S
VAS LEFT RENAL LOWER PARENCHYMA RI: 0.64
VAS LEFT RENAL MID EDV: 14.9 CM/S
VAS LEFT RENAL MID PSV: 51.7 CM/S
VAS LEFT RENAL MID RAR: 0.66
VAS LEFT RENAL MID RI: 0.71
VAS LEFT RENAL MIDDLE PARENCHYMA EDV: 5.9 CM/S
VAS LEFT RENAL MIDDLE PARENCHYMA PSV: 18.4 CM/S
VAS LEFT RENAL MIDDLE PARENCHYMA RI: 0.68
VAS LEFT RENAL PROX EDV: 20.9 CM/S
VAS LEFT RENAL PROX PSV: 80.5 CM/S
VAS LEFT RENAL PROX RAR: 1.03
VAS LEFT RENAL PROX RI: 0.74
VAS LEFT RENAL RAR: 1.03
VAS LEFT RENAL UPPER PARENCHYMA EDV: 5.9 CM/S
VAS LEFT RENAL UPPER PARENCHYMA PSV: 16.2 CM/S
VAS LEFT RENAL UPPER PARENCHYMA RI: 0.64
VAS PROX SMA EDV: 18.6 CM/S
VAS PROX SMA PSV: 144.4 CM/S
VAS RIGHT KIDNEY LENGTH: 10.6 CM
VAS RIGHT KIDNEY WIDTH: 4.25 CM
VAS RIGHT RENAL DIST EDV: 28.2 CM/S
VAS RIGHT RENAL DIST PSV: 73.9 CM/S
VAS RIGHT RENAL DIST RAR: 0.95
VAS RIGHT RENAL DIST RI: 0.62
VAS RIGHT RENAL LOWER PARENCHYMA EDV: 7.7 CM/S
VAS RIGHT RENAL LOWER PARENCHYMA PSV: 21.9 CM/S
VAS RIGHT RENAL LOWER PARENCHYMA RI: 0.65
VAS RIGHT RENAL MID EDV: 22.8 CM/S
VAS RIGHT RENAL MID PSV: 64.1 CM/S
VAS RIGHT RENAL MID RAR: 0.82
VAS RIGHT RENAL MID RI: 0.64
VAS RIGHT RENAL MIDDLE PARENCHYMA EDV: 11.6 CM/S
VAS RIGHT RENAL MIDDLE PARENCHYMA PSV: 33 CM/S
VAS RIGHT RENAL MIDDLE PARENCHYMA RI: 0.65
VAS RIGHT RENAL PROX EDV: 20 CM/S
VAS RIGHT RENAL PROX PSV: 65.3 CM/S
VAS RIGHT RENAL PROX RAR: 0.84
VAS RIGHT RENAL PROX RI: 0.69
VAS RIGHT RENAL RAR: 0.95
VAS RIGHT RENAL UPPER PARENCHYMA EDV: 9 CM/S
VAS RIGHT RENAL UPPER PARENCHYMA PSV: 27.3 CM/S
VAS RIGHT RENAL UPPER PARENCHYMA RI: 0.67

## (undated) DEVICE — KENDALL RADIOLUCENT FOAM MONITORING ELECTRODE RECTANGULAR SHAPE: Brand: KENDALL

## (undated) DEVICE — NEEDLE HYPO 18GA L1.5IN PNK S STL HUB POLYPR SHLD REG BVL

## (undated) DEVICE — MEDI-VAC NON-CONDUCTIVE SUCTION TUBING: Brand: CARDINAL HEALTH

## (undated) DEVICE — KIT JACK TBL PT CARE

## (undated) DEVICE — FLOSEAL HEMOSTATIC MATRIX, 5 ML: Brand: FLOSEAL

## (undated) DEVICE — 3M™ STERI-DRAPE™ INSTRUMENT POUCH 1018: Brand: STERI-DRAPE™

## (undated) DEVICE — STERILE POLYISOPRENE POWDER-FREE SURGICAL GLOVES: Brand: PROTEXIS

## (undated) DEVICE — CONTAINER SPEC 20 ML LID NEUT BUFF FORMALIN 10 % POLYPR STS

## (undated) DEVICE — GARMENT,MEDLINE,DVT,INT,CALF,MED, GEN2: Brand: MEDLINE

## (undated) DEVICE — TUBING IRRIG L77IN DIA0.241IN L BOR FOR CYSTO W/ NVENT

## (undated) DEVICE — 1200 GUARD II KIT W/5MM TUBE W/O VAC TUBE: Brand: GUARDIAN

## (undated) DEVICE — SNAP KOVER: Brand: UNBRANDED

## (undated) DEVICE — SYR 50ML LR LCK 1ML GRAD NSAF --

## (undated) DEVICE — SOLUTION IV 1000ML 0.9% SOD CHL

## (undated) DEVICE — SOLIDIFIER FLD 2OZ 1500CC N DISINF IN BTL DISP SAFESORB

## (undated) DEVICE — INFECTION CONTROL KIT SYS

## (undated) DEVICE — SUTURE VCRL SZ 2-0 L18IN ABSRB UD CT-1 L36MM 1/2 CIR J839D

## (undated) DEVICE — SUTURE V-LOC 180 SZ 0 L12IN ABSRB GRN L37MM GS-21 1/2 CIR VLOCL0316

## (undated) DEVICE — SYSTEM REPROC CBL 3 LD DISPOSABLE

## (undated) DEVICE — BASIN EMSIS 16OZ GRAPHITE PLAS KID SHP MOLD GRAD FOR ORAL

## (undated) DEVICE — TOWEL SURG W17XL27IN STD BLU COT NONFENESTRATED PREWASHED

## (undated) DEVICE — SOLIDIFIER MEDC 1200ML -- CONVERT TO 356117

## (undated) DEVICE — GAUZE SPONGES,12 PLY: Brand: CURITY

## (undated) DEVICE — DRAPE MICSCP W20XL64IN CLR LENS WECK FOR ZEISS OPMI

## (undated) DEVICE — SOLUTION IRRIG 1000ML H2O STRL BLT

## (undated) DEVICE — FORCEPS BX L240CM JAW DIA2.8MM L CAP W/ NDL MIC MESH TOOTH

## (undated) DEVICE — STERILE POLYISOPRENE POWDER-FREE SURGICAL GLOVES WITH EMOLLIENT COATING: Brand: PROTEXIS

## (undated) DEVICE — SINGLE-USE BIOPSY FORCEPS: Brand: RADIAL JAW 4

## (undated) DEVICE — DRAPE,LAPAROTOMY,PCH,STERILE: Brand: MEDLINE

## (undated) DEVICE — SYSTEM SKIN CLSR 22CM DERMBND PRINEO

## (undated) DEVICE — Z DISCONTINUED PER MEDLINE LINE GAS SAMPLING O2/CO2 LNG AD 13 FT NSL W/ TBNG FILTERLINE

## (undated) DEVICE — Device

## (undated) DEVICE — Z CONVERTED USE 2107985 COVER FLROSCP W36XL28IN 4 SIDE ADH

## (undated) DEVICE — HANDLE LT SNAP ON ULT DURABLE LENS FOR TRUMPF ALC DISPOSABLE

## (undated) DEVICE — PICO 7 DUAL 15X20CM: Brand: PICO™ 7

## (undated) DEVICE — TOOL 14MH30 LEGEND 14CM 3MM: Brand: MIDAS REX ™

## (undated) DEVICE — (D)PREP SKN CHLRAPRP APPL 26ML -- CONVERT TO ITEM 371833

## (undated) DEVICE — LABEL MED MRMC ORTH STRL

## (undated) DEVICE — BAG SPEC BIOHZRD 10 X 10 IN --

## (undated) DEVICE — CUFF ADULT 1 PC 1 VINYL DISP --

## (undated) DEVICE — ELECTRODE BLDE L4IN NONINSULATED EDGE

## (undated) DEVICE — PREP SKN CHLRAPRP APL 26ML STR --

## (undated) DEVICE — COVER,MAYO STAND,STERILE: Brand: MEDLINE

## (undated) DEVICE — NDL SPNE QNCKE 18GX3.5IN LF --

## (undated) DEVICE — LAMINECTOMY RICHMOND-LF: Brand: MEDLINE INDUSTRIES, INC.

## (undated) DEVICE — BONE WAX WHITE: Brand: BONE WAX WHITE

## (undated) DEVICE — SUTURE STRATAFIX SPRL MCRYL + SZ 2-0 L18IN ABSRB UD CT-1 SXMP1B413

## (undated) DEVICE — SKIN MARKER,REGULAR TIP WITH RULER AND LABELS: Brand: DEVON

## (undated) DEVICE — TRAY CATH 16F DRN BG LTX -- CONVERT TO ITEM 363158

## (undated) DEVICE — CONTAINER,SPECIMEN,3OZ,OR STRL: Brand: MEDLINE

## (undated) DEVICE — SET ADMIN 16ML TBNG L100IN 2 Y INJ SITE IV PIGGY BK DISP

## (undated) DEVICE — 4-PORT MANIFOLD: Brand: NEPTUNE 2

## (undated) DEVICE — CATH IV AUTOGRD BC PNK 20GA 25 -- INSYTE

## (undated) DEVICE — NEONATAL-ADULT SPO2 SENSOR: Brand: NELLCOR

## (undated) DEVICE — FLOSEAL MATRIX IS INDICATED IN SURGICAL PROCEDURES (OTHER THAN IN OPHTHALMIC) AS AN ADJUNCT TO HEMOSTASIS WHEN CONTROL OF BLEEDING BY LIGATURE OR CONVENTIONALPROCEDURES IS INEFFECTIVE OR IMPRACTICAL.: Brand: FLOSEAL HEMOSTATIC MATRIX

## (undated) DEVICE — BIPOLAR FORCEPS CORD: Brand: VALLEYLAB

## (undated) DEVICE — GOWN,SIRUS,NONRNF,SETINSLV,2XL,18/CS: Brand: MEDLINE

## (undated) DEVICE — BONE MARROW KIT ASPIR 11 GA

## (undated) DEVICE — TOWEL 4 PLY TISS 19X30 SUE WHT

## (undated) DEVICE — BLADE ES L4IN INSUL EDGE

## (undated) DEVICE — CATHETER IV 20GA L1.16IN OD1.0414-1.1176MM ID0.762-0.8382MM

## (undated) DEVICE — SYR 10ML LUER LOK 1/5ML GRAD --

## (undated) DEVICE — ADHESIVE SKIN CLOSURE 4X22 CM PREMIERPRO EXOFINFUSION DISP

## (undated) DEVICE — TUBING, SUCTION, 1/4" X 10', STRAIGHT: Brand: MEDLINE

## (undated) DEVICE — 3.0MM PRECISION NEURO (MATCH HEAD)

## (undated) DEVICE — DEVICE TRNSF SPIK STL 2008S] MICROTEK MEDICAL INC]

## (undated) DEVICE — PENCIL SMK EVAC L10FT DIA95MM TBNG NONSTICK W ADPT TO 22MM

## (undated) DEVICE — 450 ML BOTTLE OF 0.05% CHLORHEXIDINE GLUCONATE IN 99.95% STERILE WATER FOR IRRIGATION, USP AND APPLICATOR.: Brand: IRRISEPT ANTIMICROBIAL WOUND LAVAGE

## (undated) DEVICE — SOLUTION IRRIG 3000ML 0.9% SOD CHL FLX CONT 0797208] ICU MEDICAL INC]

## (undated) DEVICE — ENDOSCOPIC KIT COMPLIANCE ENDOKIT

## (undated) DEVICE — SUTURE VCRL SZ 1 L18IN ABSRB VLT CT-1 L36MM 1/2 CIR J741D

## (undated) DEVICE — SLIM BODY SKIN STAPLER: Brand: APPOSE ULC

## (undated) DEVICE — COVER,TABLE,60X90,STERILE: Brand: MEDLINE

## (undated) DEVICE — SOL IRR SOD CL 0.9% 1000ML BTL --

## (undated) DEVICE — PICO SINGLE USE NEGATIVE PRESSURE WOUND THERAPY SYSTEM 15CM X 20CM 6IN. X 8IN.: Brand: PICO

## (undated) DEVICE — DRAPE MICSCP 65MM LENS FOR LEICA VARI-LENS2

## (undated) DEVICE — KENDALL SCD EXPRESS SLEEVES, KNEE LENGTH, MEDIUM: Brand: KENDALL SCD

## (undated) DEVICE — SURGIFOAM SPNG SZ 100

## (undated) DEVICE — MARKER,SKIN,WI/RULER AND LABELS: Brand: MEDLINE

## (undated) DEVICE — SNARE ENDOSCP POLYP MED 2.4 MM 240 CM 27 MM 2.8 MM SHT SENS

## (undated) DEVICE — ROD SPNL L35MM DIA55MM RAD 100MM POST PEDCL PURE TI HRD
Type: IMPLANTABLE DEVICE | Site: SPINE LUMBAR | Status: NON-FUNCTIONAL
Removed: 2018-10-10

## (undated) DEVICE — SPONGE GZ W4XL4IN COT 12 PLY TYP VII WVN C FLD DSGN

## (undated) DEVICE — 3000CC GUARDIAN II: Brand: GUARDIAN

## (undated) DEVICE — CUFF BLD PRSS AD CLTH SGL TB W/ BAYNT CONN ROUNDED CORNER

## (undated) DEVICE — ELECTRODE,RADIOTRANSLUCENT,FOAM,5PK: Brand: MEDLINE

## (undated) DEVICE — SYR 3ML LL TIP 1/10ML GRAD --

## (undated) DEVICE — 3M™ TEGADERM™ TRANSPARENT FILM DRESSING FRAME STYLE, 1626W, 4 IN X 4-3/4 IN (10 CM X 12 CM), 50/CT 4CT/CASE: Brand: 3M™ TEGADERM™

## (undated) DEVICE — IV START KIT: Brand: MEDLINE